# Patient Record
Sex: MALE | Race: WHITE | NOT HISPANIC OR LATINO | Employment: OTHER | URBAN - NONMETROPOLITAN AREA
[De-identification: names, ages, dates, MRNs, and addresses within clinical notes are randomized per-mention and may not be internally consistent; named-entity substitution may affect disease eponyms.]

---

## 2018-04-18 ENCOUNTER — OFFICE VISIT (OUTPATIENT)
Dept: CARDIAC SURGERY | Facility: CLINIC | Age: 63
End: 2018-04-18

## 2018-04-18 VITALS
SYSTOLIC BLOOD PRESSURE: 138 MMHG | HEART RATE: 72 BPM | WEIGHT: 205 LBS | DIASTOLIC BLOOD PRESSURE: 82 MMHG | OXYGEN SATURATION: 98 % | BODY MASS INDEX: 27.77 KG/M2 | HEIGHT: 72 IN

## 2018-04-18 DIAGNOSIS — E11.42 CONTROLLED TYPE 2 DIABETES MELLITUS WITH DIABETIC POLYNEUROPATHY, WITHOUT LONG-TERM CURRENT USE OF INSULIN (HCC): ICD-10-CM

## 2018-04-18 DIAGNOSIS — F17.218 NICOTINE DEPENDENCE, CIGARETTES, WITH OTHER NICOTINE-INDUCED DISORDERS: ICD-10-CM

## 2018-04-18 DIAGNOSIS — I65.29 OCCLUSION AND STENOSIS OF CAROTID ARTERY: Primary | ICD-10-CM

## 2018-04-18 DIAGNOSIS — I65.23 BILATERAL CAROTID ARTERY STENOSIS: ICD-10-CM

## 2018-04-18 DIAGNOSIS — I10 BENIGN ESSENTIAL HTN: ICD-10-CM

## 2018-04-18 DIAGNOSIS — E78.2 MIXED HYPERLIPIDEMIA: ICD-10-CM

## 2018-04-18 PROCEDURE — 99406 BEHAV CHNG SMOKING 3-10 MIN: CPT | Performed by: THORACIC SURGERY (CARDIOTHORACIC VASCULAR SURGERY)

## 2018-04-18 PROCEDURE — 99205 OFFICE O/P NEW HI 60 MIN: CPT | Performed by: THORACIC SURGERY (CARDIOTHORACIC VASCULAR SURGERY)

## 2018-04-18 RX ORDER — METHOCARBAMOL 750 MG/1
750 TABLET, FILM COATED ORAL 3 TIMES DAILY
COMMUNITY
End: 2018-07-03 | Stop reason: SDUPTHER

## 2018-04-18 RX ORDER — PREGABALIN 150 MG/1
150 CAPSULE ORAL 2 TIMES DAILY
COMMUNITY
End: 2018-07-03 | Stop reason: SDUPTHER

## 2018-04-18 RX ORDER — MELOXICAM 15 MG/1
15 TABLET ORAL DAILY
COMMUNITY
End: 2018-07-03 | Stop reason: SDUPTHER

## 2018-04-18 RX ORDER — PREDNISONE 1 MG/1
5 TABLET ORAL DAILY
COMMUNITY
End: 2018-07-03 | Stop reason: SDUPTHER

## 2018-04-18 RX ORDER — MULTIVIT-MIN/IRON/FOLIC ACID/K 18-600-40
2000 CAPSULE ORAL DAILY
COMMUNITY
End: 2018-07-03 | Stop reason: SDUPTHER

## 2018-04-18 RX ORDER — PRAVASTATIN SODIUM 40 MG
40 TABLET ORAL DAILY
COMMUNITY
End: 2018-07-03 | Stop reason: SDUPTHER

## 2018-04-18 RX ORDER — HYDROXYCHLOROQUINE SULFATE 200 MG/1
200 TABLET, FILM COATED ORAL 2 TIMES DAILY
COMMUNITY
End: 2018-07-03 | Stop reason: SDUPTHER

## 2018-04-18 RX ORDER — CLONIDINE HYDROCHLORIDE 0.1 MG/1
0.1 TABLET ORAL 3 TIMES DAILY PRN
COMMUNITY
End: 2018-07-03 | Stop reason: SDUPTHER

## 2018-04-18 RX ORDER — LEVOTHYROXINE SODIUM 0.2 MG/1
200 TABLET ORAL DAILY
COMMUNITY
End: 2018-07-03 | Stop reason: SDUPTHER

## 2018-04-18 RX ORDER — FOLIC ACID 1 MG/1
1 TABLET ORAL DAILY
Status: ON HOLD | COMMUNITY
End: 2018-05-23

## 2018-04-18 RX ORDER — CANDESARTAN 32 MG/1
32 TABLET ORAL DAILY
COMMUNITY
End: 2018-07-03 | Stop reason: SDUPTHER

## 2018-04-18 RX ORDER — HYDROCODONE BITARTRATE AND ACETAMINOPHEN 10; 325 MG/1; MG/1
1 TABLET ORAL 3 TIMES DAILY PRN
COMMUNITY
End: 2018-07-03 | Stop reason: SDUPTHER

## 2018-04-18 RX ORDER — DIAZEPAM 5 MG/1
5 TABLET ORAL 2 TIMES DAILY PRN
COMMUNITY
End: 2018-07-03 | Stop reason: SDUPTHER

## 2018-04-19 PROBLEM — E11.42 CONTROLLED TYPE 2 DIABETES MELLITUS WITH DIABETIC POLYNEUROPATHY, WITHOUT LONG-TERM CURRENT USE OF INSULIN: Status: ACTIVE | Noted: 2018-04-19

## 2018-04-19 PROBLEM — E78.2 MIXED HYPERLIPIDEMIA: Status: ACTIVE | Noted: 2018-04-19

## 2018-04-19 PROBLEM — F17.218 NICOTINE DEPENDENCE, CIGARETTES, WITH OTHER NICOTINE-INDUCED DISORDERS: Status: ACTIVE | Noted: 2018-04-19

## 2018-04-19 PROBLEM — I65.29 OCCLUSION AND STENOSIS OF CAROTID ARTERY: Status: ACTIVE | Noted: 2018-04-19

## 2018-04-19 PROBLEM — I65.23 BILATERAL CAROTID ARTERY STENOSIS: Status: ACTIVE | Noted: 2018-04-19

## 2018-04-19 PROBLEM — I10 BENIGN ESSENTIAL HTN: Status: ACTIVE | Noted: 2018-04-19

## 2018-04-19 NOTE — PROGRESS NOTES
4/18/2018    Jewel Billy  1955    Chief Complaint:    Chief Complaint   Patient presents with   • Carotid Artery Disease       HPI:      PCP:  Kelsey MCCLELLAN, Kaylee MCCLELLAN    62 y.o. male with HTN(stable, increased risk stroke, rupture), Hyperlipidemia(stable, increased risk cardiovascular events), Diabetes Mellitus(stable, increased risk cardiovascular events) and Smoker(uncontrolled, increased risk cardiovascular events), Carotid Stenosis(new, increased risk stroke).  smokes 1/2 PPD.  Rushing noises in head x 1 year.  Carotid bruits noted on exam.   Carotid stenosis noted on duplex imaging.  No TIA stroke amaurosis.  No MI claudication. No other associated signs, symptoms or modifying factors.    4/4/2018 Carotid Duplex:  AWILDA >70% (321cm/s, ratio 4.9) antegrade vert.  LICA 50-69% (221cm/s, ratio 1.9)    8/2017 ECG:  NSR 85, QTc 397    The following portions of the patient's history were reviewed and updated as appropriate: allergies, current medications, past family history, past medical history, past social history, past surgical history and problem list.  Recent images independently reviewed.  Available laboratory values reviewed.    PMH:  Past Medical History:   Diagnosis Date   • Acquired hypothyroidism    • Hashimoto's thyroiditis    • Simple goiter    • Tobacco dependence syndrome        ALLERGIES:  Allergies   Allergen Reactions   • Vancomycin Unknown (See Comments)     Pt states had accident in 1980's and mother told him he had a reaction to this medication during that time         MEDICATIONS:    Current Outpatient Prescriptions:   •  candesartan (ATACAND) 32 MG tablet, Take 32 mg by mouth Daily., Disp: , Rfl:   •  Cholecalciferol (VITAMIN D) 2000 units capsule, Take  by mouth., Disp: , Rfl:   •  CloNIDine (CATAPRES) 0.1 MG tablet, Take 0.1 mg by mouth 3 (Three) Times a Day., Disp: , Rfl:   •  diazePAM (VALIUM) 5 MG tablet, Take 5 mg by mouth 2 (Two) Times a Day As Needed for Anxiety.,  Disp: , Rfl:   •  folic acid (FOLVITE) 1 MG tablet, Take 1 mg by mouth Daily., Disp: , Rfl:   •  HYDROcodone-acetaminophen (NORCO)  MG per tablet, Take 1 tablet by mouth Every 6 (Six) Hours As Needed for Moderate Pain ., Disp: , Rfl:   •  hydroxychloroquine (PLAQUENIL) 200 MG tablet, Take 400 mg by mouth Daily., Disp: , Rfl:   •  levothyroxine (SYNTHROID, LEVOTHROID) 200 MCG tablet, Take 200 mcg by mouth Daily., Disp: , Rfl:   •  meloxicam (MOBIC) 15 MG tablet, Take 15 mg by mouth Daily., Disp: , Rfl:   •  metFORMIN (GLUCOPHAGE) 500 MG tablet, Take 500 mg by mouth 2 (Two) Times a Day With Meals., Disp: , Rfl:   •  methocarbamol (ROBAXIN) 750 MG tablet, Take 750 mg by mouth 3 (Three) Times a Day., Disp: , Rfl:   •  pravastatin (PRAVACHOL) 40 MG tablet, Take 40 mg by mouth Daily., Disp: , Rfl:   •  predniSONE (DELTASONE) 5 MG tablet, Take 5 mg by mouth Daily., Disp: , Rfl:   •  pregabalin (LYRICA) 150 MG capsule, Take 150 mg by mouth 2 (Two) Times a Day., Disp: , Rfl:     Review of Systems   Review of Systems   Constitution: Positive for malaise/fatigue. Negative for night sweats and weight loss.   HENT: Negative for hearing loss, hoarse voice and stridor.    Eyes: Negative for vision loss in left eye, vision loss in right eye and visual disturbance.   Cardiovascular: Positive for claudication, irregular heartbeat, leg swelling and palpitations. Negative for chest pain.   Respiratory: Positive for sleep disturbances due to breathing. Negative for cough, hemoptysis and shortness of breath.    Hematologic/Lymphatic: Negative for adenopathy and bleeding problem. Bruises/bleeds easily.   Skin: Negative for color change, poor wound healing and rash.   Musculoskeletal: Positive for arthritis, back pain, muscle weakness and neck pain.   Gastrointestinal: Negative for abdominal pain, dysphagia and heartburn.   Neurological: Positive for dizziness, headaches, numbness and paresthesias. Negative for seizures.    Psychiatric/Behavioral: Positive for depression. Negative for altered mental status and memory loss. The patient is nervous/anxious.        Physical Exam   Physical Exam   Constitutional: He is oriented to person, place, and time. He is active and cooperative. He does not appear ill. No distress.   HENT:   Head: Atraumatic.   Right Ear: Hearing normal.   Left Ear: Hearing normal.   Nose: No nasal deformity. No epistaxis.   Mouth/Throat: He does not have dentures. Normal dentition.   Eyes: Conjunctivae and lids are normal. Right pupil is round and reactive. Left pupil is round and reactive.   Neck: No JVD present. Carotid bruit is not present. No tracheal deviation present. No thyroid mass and no thyromegaly present.   Cardiovascular: Normal rate and regular rhythm.    No murmur heard.  Pulses:       Carotid pulses are 2+ on the right side with bruit, and 2+ on the left side with bruit.       Radial pulses are 2+ on the right side, and 2+ on the left side.        Dorsalis pedis pulses are 2+ on the right side, and 2+ on the left side.        Posterior tibial pulses are 2+ on the right side, and 2+ on the left side.   Pulmonary/Chest: Effort normal and breath sounds normal.   Abdominal: Soft. He exhibits no distension and no mass. There is no splenomegaly or hepatomegaly. There is no tenderness.   Musculoskeletal: He exhibits no deformity.   Gait normal.    Lymphadenopathy:     He has no cervical adenopathy.        Right: No supraclavicular adenopathy present.        Left: No supraclavicular adenopathy present.   Neurological: He is alert and oriented to person, place, and time. He has normal strength.   Skin: Skin is warm and dry. No cyanosis or erythema. No pallor.   No venous staining   Psychiatric: He has a normal mood and affect. His speech is normal. Judgment and thought content normal.     BUN 14 Creat 1.2    ASSESSMENT:  Jewel was seen today for carotid artery disease.    Diagnoses and all orders for this  visit:    Occlusion and stenosis of carotid artery  -     CT Angiogram Carotids; Future    Nicotine dependence, cigarettes, with other nicotine-induced disorders    Bilateral carotid artery stenosis    Benign essential HTN    Mixed hyperlipidemia    Controlled type 2 diabetes mellitus with diabetic polyneuropathy, without long-term current use of insulin    PLAN:  Detailed discussion with Jewel Billy regarding situation, options and plans.  severe, symptomatic carotid stenosis.  Additional imaging required to evaluate location and degree of stenosis.  Carotid intervention is advisable.  Increased risk for Stroke and cardiovascular complications.  Carotid Stenting vs Endarterectomy is advisable.    Risks, including but not limited to:  Mortality, major morbidity 1%.  Stroke risk 2-3%.  bleeding, transfusion, infection, pulmonary, renal dysfunction, blood vessel and nerve injury.  Benefits:  reduction of stroke risk  Options: carotid endarterectomy, stenting and medical therapy discussed.   usually overnight stay in hospital if all well. Understands and wishes to proceed.    Carotid stenting vs endarterectomy with patch, completion arteriogram, radial arterial line,  Ancef. GEN  Tentative schedule following CTA     CTA Carotids  Aspirin 81mg daily    Return after above studies complete  Smoking cessation advised and assistance options offered including behavioral counseling ([employer] [facility]), Nicotine replacement therapy (patches or gum), pharmacologic therapy (Chantix, Wellbutrin). patient understands that continued use of tobacco products increases her risk of MI, CVA, PAD, cancer; counseling for 3-5min.  Recommended regular physical activity, progressive walking program.  Continue current medications as directed.  Will Obtain relevant old records.    Thank you for the opportunity to participate in this patient's care.    Copy to primary care provider.    EMR Dragon/Transcription  disclaimer:   Much of this encounter note is an electronic transcription/translation of spoken language to printed text. The electronic translation of spoken language may permit erroneous, or at times, nonsensical words or phrases to be inadvertently transcribed; Although I have reviewed the note for such errors, some may still exist.

## 2018-04-24 ENCOUNTER — HOSPITAL ENCOUNTER (OUTPATIENT)
Dept: CT IMAGING | Facility: HOSPITAL | Age: 63
Discharge: HOME | End: 2018-04-24
Admitting: THORACIC SURGERY (CARDIOTHORACIC VASCULAR SURGERY)

## 2018-04-24 DIAGNOSIS — I65.29 OCCLUSION AND STENOSIS OF CAROTID ARTERY: ICD-10-CM

## 2018-04-24 PROCEDURE — 70498 CT ANGIOGRAPHY NECK: CPT

## 2018-04-24 PROCEDURE — 0 IOPAMIDOL PER 1 ML: Performed by: THORACIC SURGERY (CARDIOTHORACIC VASCULAR SURGERY)

## 2018-04-24 RX ADMIN — IOPAMIDOL 75 ML: 755 INJECTION, SOLUTION INTRAVENOUS at 09:55

## 2018-04-30 ENCOUNTER — OFFICE VISIT (OUTPATIENT)
Dept: CARDIAC SURGERY | Facility: CLINIC | Age: 63
End: 2018-04-30

## 2018-04-30 VITALS
HEART RATE: 71 BPM | SYSTOLIC BLOOD PRESSURE: 161 MMHG | WEIGHT: 206.2 LBS | OXYGEN SATURATION: 98 % | BODY MASS INDEX: 27.93 KG/M2 | HEIGHT: 72 IN | DIASTOLIC BLOOD PRESSURE: 83 MMHG

## 2018-04-30 DIAGNOSIS — I65.29 OCCLUSION AND STENOSIS OF CAROTID ARTERY: ICD-10-CM

## 2018-04-30 DIAGNOSIS — F17.218 NICOTINE DEPENDENCE, CIGARETTES, WITH OTHER NICOTINE-INDUCED DISORDERS: ICD-10-CM

## 2018-04-30 DIAGNOSIS — I65.23 BILATERAL CAROTID ARTERY STENOSIS: Primary | ICD-10-CM

## 2018-04-30 DIAGNOSIS — I10 BENIGN ESSENTIAL HTN: ICD-10-CM

## 2018-04-30 DIAGNOSIS — E78.2 MIXED HYPERLIPIDEMIA: ICD-10-CM

## 2018-04-30 DIAGNOSIS — E11.42 CONTROLLED TYPE 2 DIABETES MELLITUS WITH DIABETIC POLYNEUROPATHY, WITHOUT LONG-TERM CURRENT USE OF INSULIN (HCC): ICD-10-CM

## 2018-04-30 PROCEDURE — 99214 OFFICE O/P EST MOD 30 MIN: CPT | Performed by: THORACIC SURGERY (CARDIOTHORACIC VASCULAR SURGERY)

## 2018-04-30 PROCEDURE — 99406 BEHAV CHNG SMOKING 3-10 MIN: CPT | Performed by: THORACIC SURGERY (CARDIOTHORACIC VASCULAR SURGERY)

## 2018-04-30 RX ORDER — SODIUM CHLORIDE 9 MG/ML
100 INJECTION, SOLUTION INTRAVENOUS CONTINUOUS
Status: CANCELLED | OUTPATIENT
Start: 2018-05-23

## 2018-05-07 NOTE — PROGRESS NOTES
4/30/2018    Jewel Billy  1955    Chief Complaint:    Chief Complaint   Patient presents with   • Carotid Artery Disease     CTA results       HPI:    PCP:  Kelsey MCCLELLAN, Kaylee MCCLELLAN     62 y.o. male with HTN(stable, increased risk stroke, rupture), Hyperlipidemia(stable, increased risk cardiovascular events), Diabetes Mellitus(stable, increased risk cardiovascular events) and Smoker(uncontrolled, increased risk cardiovascular events), Carotid Stenosis(new, increased risk stroke).  smokes 1/2 PPD.  Rushing noises in head x 1 year.  Carotid bruits noted on exam.   Carotid stenosis noted on duplex imaging.  No TIA stroke amaurosis.  No MI claudication. No other associated signs, symptoms or modifying factors.     4/4/2018 Carotid Duplex:  AWILDA >70% (321cm/s, ratio 4.9) antegrade vert.  LICA 50-69% (221cm/s, ratio 1.9)   4/24/2018 CTA Carotids:  AWILDA 90%, LICA 80%    8/2017 ECG:  NSR 85, QTc 397      The following portions of the patient's history were reviewed and updated as appropriate: allergies, current medications, past family history, past medical history, past social history, past surgical history and problem list.  Recent images independently reviewed.  Available laboratory values reviewed.    PMH:  Past Medical History:   Diagnosis Date   • Acquired hypothyroidism    • Hashimoto's thyroiditis    • Simple goiter    • Tobacco dependence syndrome        ALLERGIES:  Allergies   Allergen Reactions   • Vancomycin Unknown (See Comments)     Pt states had accident in 1980's and mother told him he had a reaction to this medication during that time         MEDICATIONS:    Current Outpatient Prescriptions:   •  candesartan (ATACAND) 32 MG tablet, Take 32 mg by mouth Daily., Disp: , Rfl:   •  Cholecalciferol (VITAMIN D) 2000 units capsule, Take  by mouth., Disp: , Rfl:   •  CloNIDine (CATAPRES) 0.1 MG tablet, Take 0.1 mg by mouth 3 (Three) Times a Day., Disp: , Rfl:   •  diazePAM (VALIUM) 5 MG  tablet, Take 5 mg by mouth 2 (Two) Times a Day As Needed for Anxiety., Disp: , Rfl:   •  folic acid (FOLVITE) 1 MG tablet, Take 1 mg by mouth Daily., Disp: , Rfl:   •  HYDROcodone-acetaminophen (NORCO)  MG per tablet, Take 1 tablet by mouth Every 6 (Six) Hours As Needed for Moderate Pain ., Disp: , Rfl:   •  hydroxychloroquine (PLAQUENIL) 200 MG tablet, Take 400 mg by mouth Daily., Disp: , Rfl:   •  levothyroxine (SYNTHROID, LEVOTHROID) 200 MCG tablet, Take 200 mcg by mouth Daily., Disp: , Rfl:   •  meloxicam (MOBIC) 15 MG tablet, Take 15 mg by mouth Daily., Disp: , Rfl:   •  metFORMIN (GLUCOPHAGE) 500 MG tablet, Take 500 mg by mouth 2 (Two) Times a Day With Meals., Disp: , Rfl:   •  methocarbamol (ROBAXIN) 750 MG tablet, Take 750 mg by mouth 3 (Three) Times a Day., Disp: , Rfl:   •  pravastatin (PRAVACHOL) 40 MG tablet, Take 40 mg by mouth Daily., Disp: , Rfl:   •  predniSONE (DELTASONE) 5 MG tablet, Take 5 mg by mouth Daily., Disp: , Rfl:   •  pregabalin (LYRICA) 150 MG capsule, Take 150 mg by mouth 2 (Two) Times a Day., Disp: , Rfl:     Review of Systems   Review of Systems   Constitution: Positive for malaise/fatigue. Negative for weakness and weight loss.   Cardiovascular: Positive for claudication, irregular heartbeat and leg swelling. Negative for chest pain and dyspnea on exertion.   Respiratory: Positive for sleep disturbances due to breathing. Negative for cough and shortness of breath.    Hematologic/Lymphatic: Bruises/bleeds easily.   Skin: Negative for color change and poor wound healing.   Musculoskeletal: Positive for arthritis, back pain, muscle weakness and neck pain.   Neurological: Positive for dizziness, numbness and paresthesias.   Psychiatric/Behavioral: Positive for depression. The patient is nervous/anxious.        Physical Exam   Physical Exam   Constitutional: He is oriented to person, place, and time. He is active and cooperative. He does not appear ill. No distress.   HENT:   Right  Ear: Hearing normal.   Left Ear: Hearing normal.   Nose: No nasal deformity. No epistaxis.   Mouth/Throat: He does not have dentures. Normal dentition.   Cardiovascular: Normal rate and regular rhythm.    No murmur heard.  Pulses:       Carotid pulses are 2+ on the right side with bruit, and 2+ on the left side with bruit.       Radial pulses are 2+ on the right side, and 2+ on the left side.        Dorsalis pedis pulses are 2+ on the right side, and 2+ on the left side.        Posterior tibial pulses are 2+ on the right side, and 2+ on the left side.   Pulmonary/Chest: Effort normal and breath sounds normal.   Abdominal: Soft. He exhibits no distension and no mass. There is no tenderness.   Musculoskeletal: He exhibits no deformity.   Gait normal.    Neurological: He is alert and oriented to person, place, and time. He has normal strength.   Skin: Skin is warm and dry. No cyanosis or erythema. No pallor.   No venous staining   Psychiatric: He has a normal mood and affect. His speech is normal. Judgment and thought content normal.   BUN 14 Creat 1.2    ASSESSMENT:  Jewel was seen today for carotid artery disease.    Diagnoses and all orders for this visit:    Bilateral carotid artery stenosis    Occlusion and stenosis of carotid artery  -     Case Request; Standing  -     Type and screen; Future  -     ceFAZolin (ANCEF) 2 g in sodium chloride 0.9 % 100 mL IVPB; Infuse 2 g into a venous catheter 1 (One) Time.  -     sodium chloride 0.9 % infusion; Infuse 100 mL/hr into a venous catheter Continuous.  -     Case Request    Mixed hyperlipidemia    Benign essential HTN    Controlled type 2 diabetes mellitus with diabetic polyneuropathy, without long-term current use of insulin    Nicotine dependence, cigarettes, with other nicotine-induced disorders    Other orders  -     Inpatient Admission; Standing  -     Provide NPO Instructions to Patient; Future  -     Clorhexidine Skin Prep; Future  -     Obtain informed  consent; Standing  -     Insert Arterial Line; Standing  -     Chlorhexidine 4%/Hibiclens Skin Prep; Standing  -     Place sequential compression device; Standing    PLAN:  Detailed discussion with Jewel Billy regarding situation, options and plans.  severe, asymptomatic carotid stenosis.  Carotid intervention is advisable.  Increased risk for Stroke and cardiovascular complications.  Carotid Endarterectomy is advisable.    Risks, including but not limited to:  Mortality, major morbidity 1%.  Stroke risk 2-3%.  bleeding, transfusion, infection, pulmonary, renal dysfunction, blood vessel and nerve injury.  Benefits:  reduction of stroke risk  Options: carotid endarterectomy, stenting and medical therapy discussed.   usually overnight stay in hospital if all well. Understands and wishes to proceed.    RIGHT Carotid endarterectomy with patch, completion arteriogram, radial arterial line,  Ancef. GEN  SDS 5/23/2018     Return after above studies complete  Smoking cessation advised and assistance options offered including behavioral counseling ([employer] [facility] [facility]), Nicotine replacement therapy (patches or gum), pharmacologic therapy (Chantix, Wellbutrin). patient understands that continued use of tobacco products increases her risk of MI, CVA, PAD, cancer; counseling for 3-5min.    Recommended regular physical activity, progressive walking program.  Continue current medications as directed.    Thank you for the opportunity to participate in this patient's care.    Copy to primary care provider.    EMR Dragon/Transcription disclaimer:   Much of this encounter note is an electronic transcription/translation of spoken language to printed text. The electronic translation of spoken language may permit erroneous, or at times, nonsensical words or phrases to be inadvertently transcribed; Although I have reviewed the note for such errors, some may still exist.

## 2018-05-17 ENCOUNTER — APPOINTMENT (OUTPATIENT)
Dept: PREADMISSION TESTING | Facility: HOSPITAL | Age: 63
End: 2018-05-17

## 2018-05-17 VITALS
HEART RATE: 70 BPM | RESPIRATION RATE: 12 BRPM | BODY MASS INDEX: 27.43 KG/M2 | DIASTOLIC BLOOD PRESSURE: 84 MMHG | WEIGHT: 202.5 LBS | HEIGHT: 72 IN | SYSTOLIC BLOOD PRESSURE: 154 MMHG

## 2018-05-17 DIAGNOSIS — I65.29 OCCLUSION AND STENOSIS OF CAROTID ARTERY: ICD-10-CM

## 2018-05-17 LAB
ABO GROUP BLD: NORMAL
ANION GAP SERPL CALCULATED.3IONS-SCNC: 12 MMOL/L (ref 5–15)
BLD GP AB SCN SERPL QL: NEGATIVE
BUN BLD-MCNC: 16 MG/DL (ref 7–21)
BUN/CREAT SERPL: 15 (ref 7–25)
CALCIUM SPEC-SCNC: 9.9 MG/DL (ref 8.4–10.2)
CHLORIDE SERPL-SCNC: 99 MMOL/L (ref 95–110)
CO2 SERPL-SCNC: 25 MMOL/L (ref 22–31)
CREAT BLD-MCNC: 1.07 MG/DL (ref 0.7–1.3)
DEPRECATED RDW RBC AUTO: 44.6 FL (ref 35.1–43.9)
ERYTHROCYTE [DISTWIDTH] IN BLOOD BY AUTOMATED COUNT: 14.2 % (ref 11.5–14.5)
GFR SERPL CREATININE-BSD FRML MDRD: 70 ML/MIN/1.73 (ref 49–113)
GLUCOSE BLD-MCNC: 141 MG/DL (ref 60–100)
HCT VFR BLD AUTO: 42.9 % (ref 39–49)
HGB BLD-MCNC: 15 G/DL (ref 13.7–17.3)
Lab: NORMAL
MCH RBC QN AUTO: 29.5 PG (ref 26.5–34)
MCHC RBC AUTO-ENTMCNC: 35 G/DL (ref 31.5–36.3)
MCV RBC AUTO: 84.4 FL (ref 80–98)
PLATELET # BLD AUTO: 239 10*3/MM3 (ref 150–450)
PMV BLD AUTO: 9.5 FL (ref 8–12)
POTASSIUM BLD-SCNC: 4.9 MMOL/L (ref 3.5–5.1)
RBC # BLD AUTO: 5.08 10*6/MM3 (ref 4.37–5.74)
RH BLD: NEGATIVE
SODIUM BLD-SCNC: 136 MMOL/L (ref 137–145)
T&S EXPIRATION DATE: NORMAL
WBC NRBC COR # BLD: 9.2 10*3/MM3 (ref 3.2–9.8)

## 2018-05-17 PROCEDURE — 86850 RBC ANTIBODY SCREEN: CPT | Performed by: THORACIC SURGERY (CARDIOTHORACIC VASCULAR SURGERY)

## 2018-05-17 PROCEDURE — 93005 ELECTROCARDIOGRAM TRACING: CPT

## 2018-05-17 PROCEDURE — 86900 BLOOD TYPING SEROLOGIC ABO: CPT | Performed by: THORACIC SURGERY (CARDIOTHORACIC VASCULAR SURGERY)

## 2018-05-17 PROCEDURE — 36415 COLL VENOUS BLD VENIPUNCTURE: CPT

## 2018-05-17 PROCEDURE — 80048 BASIC METABOLIC PNL TOTAL CA: CPT | Performed by: ANESTHESIOLOGY

## 2018-05-17 PROCEDURE — 85027 COMPLETE CBC AUTOMATED: CPT | Performed by: ANESTHESIOLOGY

## 2018-05-17 PROCEDURE — 86901 BLOOD TYPING SEROLOGIC RH(D): CPT | Performed by: THORACIC SURGERY (CARDIOTHORACIC VASCULAR SURGERY)

## 2018-05-17 PROCEDURE — 93010 ELECTROCARDIOGRAM REPORT: CPT | Performed by: INTERNAL MEDICINE

## 2018-05-17 RX ORDER — FAMCICLOVIR 500 MG/1
500 TABLET ORAL EVERY 8 HOURS PRN
COMMUNITY
End: 2018-07-03 | Stop reason: SDUPTHER

## 2018-05-17 NOTE — DISCHARGE INSTRUCTIONS
[facility]  Pre-op Information and Guidelines    You will be called after 2 p.m. the day before your surgery (Friday for Monday surgery) and notified of your time for arrival and approximate surgery time.  If you have not received a call by 4P.M., please contact Same Day Surgery at [phone] of if outside [facility] call [phone].    Please Follow these Important Safety Guidelines:    • The morning of your procedure, take only the medications listed below with   A sip of water:_____________________________________________       ______________________________________________    • DO NOT eat or drink anything after 12:00 midnight the night before surgery  Specific instructions concerning drinking clear liquids will be discussed during  the pre-surgery instruction call the day before your surgery.    • If you take a blood thinner (ex. Plavix, Coumadin, aspirin), ask your doctor when to stop it before surgery  STOP DATE: _________________    • Only 2 visitors are allowed in patient rooms at a time  Your visitors will be asked to wait in the lobby until the admission process is complete with the exception of a parent with a child and patients in need of special assistance.    • YOU CANNOT DRIVE YOURSELF HOME  You must be accompanied by someone who will be responsible for driving you home after surgery and for your care at home.    • DO NOT chew gum, use breath mints, hard candy, or smoke the day of surgery  • DO NOT drink alcohol for at least 24 hours before your surgery  • DO NOT wear any jewelry and remove all body piercing before coming to the hospital  • DO NOT wear make-up to the hospital  • If you are having surgery on an extremity (arm/leg/foot) remove nail polish/artificial nails on the surgical side  • Clothing, glasses, contacts, dentures, and hairpieces must be removed before surgery  • Bathe the night before or the morning of your surgery and do not use powders/lotions on  skin.

## 2018-05-17 NOTE — PAT
Read EKG to Dr. Vallejo over the phone, no further orders or instructions at this time.  Chlorhexidine cloths given with written and verbal instructions.

## 2018-05-22 ENCOUNTER — ANESTHESIA EVENT (OUTPATIENT)
Dept: PERIOP | Facility: HOSPITAL | Age: 63
End: 2018-05-22

## 2018-05-23 ENCOUNTER — ANESTHESIA (OUTPATIENT)
Dept: PERIOP | Facility: HOSPITAL | Age: 63
End: 2018-05-23

## 2018-05-23 ENCOUNTER — APPOINTMENT (OUTPATIENT)
Dept: GENERAL RADIOLOGY | Facility: HOSPITAL | Age: 63
End: 2018-05-23

## 2018-05-23 ENCOUNTER — HOSPITAL ENCOUNTER (INPATIENT)
Facility: HOSPITAL | Age: 63
LOS: 1 days | Discharge: HOME | End: 2018-05-24
Attending: THORACIC SURGERY (CARDIOTHORACIC VASCULAR SURGERY) | Admitting: THORACIC SURGERY (CARDIOTHORACIC VASCULAR SURGERY)

## 2018-05-23 DIAGNOSIS — I65.29 OCCLUSION AND STENOSIS OF CAROTID ARTERY: ICD-10-CM

## 2018-05-23 LAB
ABO GROUP BLD: NORMAL
BLD GP AB SCN SERPL QL: NEGATIVE
GLUCOSE BLDC GLUCOMTR-MCNC: 116 MG/DL (ref 70–130)
GLUCOSE BLDC GLUCOMTR-MCNC: 150 MG/DL (ref 70–130)
Lab: NORMAL
RH BLD: NEGATIVE
T&S EXPIRATION DATE: NORMAL

## 2018-05-23 PROCEDURE — 86850 RBC ANTIBODY SCREEN: CPT | Performed by: ANESTHESIOLOGY

## 2018-05-23 PROCEDURE — 25010000002 PHENYLEPHRINE PER 1 ML: Performed by: NURSE ANESTHETIST, CERTIFIED REGISTERED

## 2018-05-23 PROCEDURE — 76000 FLUOROSCOPY <1 HR PHYS/QHP: CPT

## 2018-05-23 PROCEDURE — 94760 N-INVAS EAR/PLS OXIMETRY 1: CPT

## 2018-05-23 PROCEDURE — 82962 GLUCOSE BLOOD TEST: CPT

## 2018-05-23 PROCEDURE — 63710000001 PREDNISONE PER 5 MG: Performed by: THORACIC SURGERY (CARDIOTHORACIC VASCULAR SURGERY)

## 2018-05-23 PROCEDURE — 94799 UNLISTED PULMONARY SVC/PX: CPT

## 2018-05-23 PROCEDURE — 25010000002 IOPAMIDOL 61 % SOLUTION: Performed by: THORACIC SURGERY (CARDIOTHORACIC VASCULAR SURGERY)

## 2018-05-23 PROCEDURE — 86901 BLOOD TYPING SEROLOGIC RH(D): CPT | Performed by: ANESTHESIOLOGY

## 2018-05-23 PROCEDURE — 88311 DECALCIFY TISSUE: CPT | Performed by: THORACIC SURGERY (CARDIOTHORACIC VASCULAR SURGERY)

## 2018-05-23 PROCEDURE — 25010000002 HYDROMORPHONE PER 4 MG: Performed by: NURSE ANESTHETIST, CERTIFIED REGISTERED

## 2018-05-23 PROCEDURE — 25010000003 CEFAZOLIN PER 500 MG: Performed by: THORACIC SURGERY (CARDIOTHORACIC VASCULAR SURGERY)

## 2018-05-23 PROCEDURE — 25010000002 FENTANYL CITRATE (PF) 100 MCG/2ML SOLUTION: Performed by: NURSE ANESTHETIST, CERTIFIED REGISTERED

## 2018-05-23 PROCEDURE — 88304 TISSUE EXAM BY PATHOLOGIST: CPT | Performed by: THORACIC SURGERY (CARDIOTHORACIC VASCULAR SURGERY)

## 2018-05-23 PROCEDURE — 25010000002 PROPOFOL 10 MG/ML EMULSION: Performed by: NURSE ANESTHETIST, CERTIFIED REGISTERED

## 2018-05-23 PROCEDURE — 25010000002 HEPARIN (PORCINE) PER 1000 UNITS: Performed by: NURSE ANESTHETIST, CERTIFIED REGISTERED

## 2018-05-23 PROCEDURE — 25010000002 SUCCINYLCHOLINE PER 20 MG: Performed by: NURSE ANESTHETIST, CERTIFIED REGISTERED

## 2018-05-23 PROCEDURE — 25010000002 PROTAMINE SULFATE PER 10 MG: Performed by: NURSE ANESTHETIST, CERTIFIED REGISTERED

## 2018-05-23 PROCEDURE — C1768 GRAFT, VASCULAR: HCPCS | Performed by: THORACIC SURGERY (CARDIOTHORACIC VASCULAR SURGERY)

## 2018-05-23 PROCEDURE — 88304 TISSUE EXAM BY PATHOLOGIST: CPT | Performed by: PATHOLOGY

## 2018-05-23 PROCEDURE — 35301 RECHANNELING OF ARTERY: CPT | Performed by: THORACIC SURGERY (CARDIOTHORACIC VASCULAR SURGERY)

## 2018-05-23 PROCEDURE — 94640 AIRWAY INHALATION TREATMENT: CPT

## 2018-05-23 PROCEDURE — 25010000002 MIDAZOLAM PER 1 MG: Performed by: NURSE ANESTHETIST, CERTIFIED REGISTERED

## 2018-05-23 PROCEDURE — 25010000002 NEOSTIGMINE 4 MG/4ML SOLUTION PREFILLED SYRINGE: Performed by: NURSE ANESTHETIST, CERTIFIED REGISTERED

## 2018-05-23 PROCEDURE — 88311 DECALCIFY TISSUE: CPT | Performed by: PATHOLOGY

## 2018-05-23 PROCEDURE — 93010 ELECTROCARDIOGRAM REPORT: CPT | Performed by: INTERNAL MEDICINE

## 2018-05-23 PROCEDURE — 03CM0ZZ EXTIRPATION OF MATTER FROM RIGHT EXTERNAL CAROTID ARTERY, OPEN APPROACH: ICD-10-PCS | Performed by: THORACIC SURGERY (CARDIOTHORACIC VASCULAR SURGERY)

## 2018-05-23 PROCEDURE — 86900 BLOOD TYPING SEROLOGIC ABO: CPT | Performed by: ANESTHESIOLOGY

## 2018-05-23 PROCEDURE — 03CK0ZZ EXTIRPATION OF MATTER FROM RIGHT INTERNAL CAROTID ARTERY, OPEN APPROACH: ICD-10-PCS | Performed by: THORACIC SURGERY (CARDIOTHORACIC VASCULAR SURGERY)

## 2018-05-23 PROCEDURE — 93005 ELECTROCARDIOGRAM TRACING: CPT | Performed by: ANESTHESIOLOGY

## 2018-05-23 PROCEDURE — 25010000002 HEPARIN (PORCINE) PER 1000 UNITS: Performed by: THORACIC SURGERY (CARDIOTHORACIC VASCULAR SURGERY)

## 2018-05-23 DEVICE — PTCH VASC XENOSURE BIOLOGIC 1X6CM: Type: IMPLANTABLE DEVICE | Site: CAROTID | Status: FUNCTIONAL

## 2018-05-23 RX ORDER — LIDOCAINE HYDROCHLORIDE 20 MG/ML
INJECTION, SOLUTION INFILTRATION; PERINEURAL AS NEEDED
Status: DISCONTINUED | OUTPATIENT
Start: 2018-05-23 | End: 2018-05-23 | Stop reason: SURG

## 2018-05-23 RX ORDER — FLUMAZENIL 0.1 MG/ML
0.2 INJECTION INTRAVENOUS AS NEEDED
Status: DISCONTINUED | OUTPATIENT
Start: 2018-05-23 | End: 2018-05-23 | Stop reason: HOSPADM

## 2018-05-23 RX ORDER — CLONIDINE HYDROCHLORIDE 0.1 MG/1
0.1 TABLET ORAL 3 TIMES DAILY
Status: DISCONTINUED | OUTPATIENT
Start: 2018-05-23 | End: 2018-05-24 | Stop reason: HOSPADM

## 2018-05-23 RX ORDER — PREDNISONE 1 MG/1
5 TABLET ORAL DAILY
Status: DISCONTINUED | OUTPATIENT
Start: 2018-05-23 | End: 2018-05-24 | Stop reason: HOSPADM

## 2018-05-23 RX ORDER — SODIUM CHLORIDE 9 MG/ML
75 INJECTION, SOLUTION INTRAVENOUS CONTINUOUS
Status: DISCONTINUED | OUTPATIENT
Start: 2018-05-23 | End: 2018-05-24 | Stop reason: HOSPADM

## 2018-05-23 RX ORDER — GLYCOPYRROLATE 0.2 MG/ML
INJECTION INTRAMUSCULAR; INTRAVENOUS AS NEEDED
Status: DISCONTINUED | OUTPATIENT
Start: 2018-05-23 | End: 2018-05-23 | Stop reason: SURG

## 2018-05-23 RX ORDER — SODIUM CHLORIDE 9 MG/ML
INJECTION, SOLUTION INTRAVENOUS AS NEEDED
Status: DISCONTINUED | OUTPATIENT
Start: 2018-05-23 | End: 2018-05-24 | Stop reason: HOSPADM

## 2018-05-23 RX ORDER — HEPARIN SODIUM 1000 [USP'U]/ML
INJECTION, SOLUTION INTRAVENOUS; SUBCUTANEOUS AS NEEDED
Status: DISCONTINUED | OUTPATIENT
Start: 2018-05-23 | End: 2018-05-23 | Stop reason: SURG

## 2018-05-23 RX ORDER — PROTAMINE SULFATE 10 MG/ML
INJECTION, SOLUTION INTRAVENOUS AS NEEDED
Status: DISCONTINUED | OUTPATIENT
Start: 2018-05-23 | End: 2018-05-23 | Stop reason: SURG

## 2018-05-23 RX ORDER — SUCCINYLCHOLINE CHLORIDE 20 MG/ML
INJECTION INTRAMUSCULAR; INTRAVENOUS AS NEEDED
Status: DISCONTINUED | OUTPATIENT
Start: 2018-05-23 | End: 2018-05-23 | Stop reason: SURG

## 2018-05-23 RX ORDER — ROCURONIUM BROMIDE 10 MG/ML
INJECTION, SOLUTION INTRAVENOUS AS NEEDED
Status: DISCONTINUED | OUTPATIENT
Start: 2018-05-23 | End: 2018-05-23 | Stop reason: SURG

## 2018-05-23 RX ORDER — ONDANSETRON 4 MG/1
4 TABLET, ORALLY DISINTEGRATING ORAL EVERY 6 HOURS PRN
Status: DISCONTINUED | OUTPATIENT
Start: 2018-05-23 | End: 2018-05-24 | Stop reason: HOSPADM

## 2018-05-23 RX ORDER — DIPHENHYDRAMINE HYDROCHLORIDE 50 MG/ML
12.5 INJECTION INTRAMUSCULAR; INTRAVENOUS
Status: DISCONTINUED | OUTPATIENT
Start: 2018-05-23 | End: 2018-05-23 | Stop reason: HOSPADM

## 2018-05-23 RX ORDER — NALOXONE HCL 0.4 MG/ML
0.2 VIAL (ML) INJECTION AS NEEDED
Status: DISCONTINUED | OUTPATIENT
Start: 2018-05-23 | End: 2018-05-23 | Stop reason: HOSPADM

## 2018-05-23 RX ORDER — ONDANSETRON 2 MG/ML
4 INJECTION INTRAMUSCULAR; INTRAVENOUS ONCE AS NEEDED
Status: DISCONTINUED | OUTPATIENT
Start: 2018-05-23 | End: 2018-05-23 | Stop reason: HOSPADM

## 2018-05-23 RX ORDER — HEPARIN SODIUM 5000 [USP'U]/ML
INJECTION, SOLUTION INTRAVENOUS; SUBCUTANEOUS AS NEEDED
Status: DISCONTINUED | OUTPATIENT
Start: 2018-05-23 | End: 2018-05-24 | Stop reason: HOSPADM

## 2018-05-23 RX ORDER — PREGABALIN 75 MG/1
150 CAPSULE ORAL EVERY 12 HOURS SCHEDULED
Status: DISCONTINUED | OUTPATIENT
Start: 2018-05-23 | End: 2018-05-24 | Stop reason: HOSPADM

## 2018-05-23 RX ORDER — FAMOTIDINE 20 MG/1
20 TABLET, FILM COATED ORAL EVERY 12 HOURS SCHEDULED
Status: DISCONTINUED | OUTPATIENT
Start: 2018-05-23 | End: 2018-05-24 | Stop reason: HOSPADM

## 2018-05-23 RX ORDER — SODIUM CHLORIDE, SODIUM GLUCONATE, SODIUM ACETATE, POTASSIUM CHLORIDE, AND MAGNESIUM CHLORIDE 526; 502; 368; 37; 30 MG/100ML; MG/100ML; MG/100ML; MG/100ML; MG/100ML
INJECTION, SOLUTION INTRAVENOUS CONTINUOUS PRN
Status: DISCONTINUED | OUTPATIENT
Start: 2018-05-23 | End: 2018-05-23 | Stop reason: SURG

## 2018-05-23 RX ORDER — HYDROXYCHLOROQUINE SULFATE 200 MG/1
200 TABLET, FILM COATED ORAL 2 TIMES DAILY
Status: DISCONTINUED | OUTPATIENT
Start: 2018-05-23 | End: 2018-05-24 | Stop reason: HOSPADM

## 2018-05-23 RX ORDER — EPHEDRINE SULFATE 50 MG/ML
INJECTION, SOLUTION INTRAVENOUS AS NEEDED
Status: DISCONTINUED | OUTPATIENT
Start: 2018-05-23 | End: 2018-05-23 | Stop reason: SURG

## 2018-05-23 RX ORDER — PROPOFOL 10 MG/ML
VIAL (ML) INTRAVENOUS AS NEEDED
Status: DISCONTINUED | OUTPATIENT
Start: 2018-05-23 | End: 2018-05-23 | Stop reason: SURG

## 2018-05-23 RX ORDER — ASPIRIN 325 MG
325 TABLET ORAL ONCE
Status: COMPLETED | OUTPATIENT
Start: 2018-05-23 | End: 2018-05-23

## 2018-05-23 RX ORDER — SODIUM CHLORIDE, SODIUM GLUCONATE, SODIUM ACETATE, POTASSIUM CHLORIDE AND MAGNESIUM CHLORIDE 526; 502; 368; 37; 30 MG/100ML; MG/100ML; MG/100ML; MG/100ML; MG/100ML
INJECTION, SOLUTION INTRAVENOUS AS NEEDED
Status: DISCONTINUED | OUTPATIENT
Start: 2018-05-23 | End: 2018-05-23

## 2018-05-23 RX ORDER — ATORVASTATIN CALCIUM 20 MG/1
20 TABLET, FILM COATED ORAL DAILY
Status: DISCONTINUED | OUTPATIENT
Start: 2018-05-23 | End: 2018-05-24 | Stop reason: HOSPADM

## 2018-05-23 RX ORDER — NITROGLYCERIN 5 MG/ML
INJECTION, SOLUTION INTRAVENOUS AS NEEDED
Status: DISCONTINUED | OUTPATIENT
Start: 2018-05-23 | End: 2018-05-23 | Stop reason: SURG

## 2018-05-23 RX ORDER — ACETAMINOPHEN 650 MG/1
650 SUPPOSITORY RECTAL ONCE AS NEEDED
Status: DISCONTINUED | OUTPATIENT
Start: 2018-05-23 | End: 2018-05-23 | Stop reason: HOSPADM

## 2018-05-23 RX ORDER — NITROGLYCERIN 40 MG/100ML
5-200 INJECTION INTRAVENOUS
Status: DISCONTINUED | OUTPATIENT
Start: 2018-05-23 | End: 2018-05-24 | Stop reason: HOSPADM

## 2018-05-23 RX ORDER — HYDROCODONE BITARTRATE AND ACETAMINOPHEN 10; 325 MG/1; MG/1
1 TABLET ORAL EVERY 4 HOURS PRN
Status: DISCONTINUED | OUTPATIENT
Start: 2018-05-23 | End: 2018-05-24 | Stop reason: HOSPADM

## 2018-05-23 RX ORDER — LIDOCAINE HYDROCHLORIDE 10 MG/ML
INJECTION, SOLUTION INFILTRATION; PERINEURAL AS NEEDED
Status: DISCONTINUED | OUTPATIENT
Start: 2018-05-23 | End: 2018-05-24 | Stop reason: HOSPADM

## 2018-05-23 RX ORDER — NEOSTIGMINE METHYLSULFATE 4 MG/4 ML
SYRINGE (ML) INTRAVENOUS AS NEEDED
Status: DISCONTINUED | OUTPATIENT
Start: 2018-05-23 | End: 2018-05-23 | Stop reason: SURG

## 2018-05-23 RX ORDER — ACETAMINOPHEN 325 MG/1
650 TABLET ORAL ONCE AS NEEDED
Status: DISCONTINUED | OUTPATIENT
Start: 2018-05-23 | End: 2018-05-23 | Stop reason: HOSPADM

## 2018-05-23 RX ORDER — MIDAZOLAM HYDROCHLORIDE 1 MG/ML
INJECTION INTRAMUSCULAR; INTRAVENOUS AS NEEDED
Status: DISCONTINUED | OUTPATIENT
Start: 2018-05-23 | End: 2018-05-23 | Stop reason: SURG

## 2018-05-23 RX ORDER — MEPERIDINE HYDROCHLORIDE 50 MG/ML
12.5 INJECTION INTRAMUSCULAR; INTRAVENOUS; SUBCUTANEOUS
Status: DISCONTINUED | OUTPATIENT
Start: 2018-05-23 | End: 2018-05-23 | Stop reason: HOSPADM

## 2018-05-23 RX ORDER — SODIUM CHLORIDE 9 MG/ML
100 INJECTION, SOLUTION INTRAVENOUS CONTINUOUS
Status: DISCONTINUED | OUTPATIENT
Start: 2018-05-23 | End: 2018-05-23 | Stop reason: HOSPADM

## 2018-05-23 RX ORDER — LEVOTHYROXINE SODIUM 0.1 MG/1
200 TABLET ORAL
Status: DISCONTINUED | OUTPATIENT
Start: 2018-05-24 | End: 2018-05-24 | Stop reason: HOSPADM

## 2018-05-23 RX ORDER — VALSARTAN 160 MG/1
320 TABLET ORAL
Status: DISCONTINUED | OUTPATIENT
Start: 2018-05-23 | End: 2018-05-24 | Stop reason: HOSPADM

## 2018-05-23 RX ORDER — ONDANSETRON 2 MG/ML
4 INJECTION INTRAMUSCULAR; INTRAVENOUS EVERY 6 HOURS PRN
Status: DISCONTINUED | OUTPATIENT
Start: 2018-05-23 | End: 2018-05-24 | Stop reason: HOSPADM

## 2018-05-23 RX ORDER — FENTANYL CITRATE 50 UG/ML
INJECTION, SOLUTION INTRAMUSCULAR; INTRAVENOUS AS NEEDED
Status: DISCONTINUED | OUTPATIENT
Start: 2018-05-23 | End: 2018-05-23 | Stop reason: SURG

## 2018-05-23 RX ORDER — MELOXICAM 15 MG/1
15 TABLET ORAL DAILY
Status: DISCONTINUED | OUTPATIENT
Start: 2018-05-24 | End: 2018-05-24 | Stop reason: HOSPADM

## 2018-05-23 RX ORDER — BISACODYL 10 MG
10 SUPPOSITORY, RECTAL RECTAL DAILY PRN
Status: DISCONTINUED | OUTPATIENT
Start: 2018-05-23 | End: 2018-05-24 | Stop reason: HOSPADM

## 2018-05-23 RX ORDER — ALBUTEROL SULFATE 2.5 MG/3ML
2.5 SOLUTION RESPIRATORY (INHALATION)
Status: DISCONTINUED | OUTPATIENT
Start: 2018-05-23 | End: 2018-05-24 | Stop reason: HOSPADM

## 2018-05-23 RX ORDER — EPHEDRINE SULFATE 50 MG/ML
5 INJECTION, SOLUTION INTRAVENOUS ONCE AS NEEDED
Status: DISCONTINUED | OUTPATIENT
Start: 2018-05-23 | End: 2018-05-23 | Stop reason: HOSPADM

## 2018-05-23 RX ORDER — ONDANSETRON 4 MG/1
4 TABLET, FILM COATED ORAL EVERY 6 HOURS PRN
Status: DISCONTINUED | OUTPATIENT
Start: 2018-05-23 | End: 2018-05-24 | Stop reason: HOSPADM

## 2018-05-23 RX ORDER — SENNA AND DOCUSATE SODIUM 50; 8.6 MG/1; MG/1
2 TABLET, FILM COATED ORAL 2 TIMES DAILY PRN
Status: DISCONTINUED | OUTPATIENT
Start: 2018-05-23 | End: 2018-05-24 | Stop reason: HOSPADM

## 2018-05-23 RX ORDER — ASPIRIN 81 MG/1
81 TABLET ORAL DAILY
Status: DISCONTINUED | OUTPATIENT
Start: 2018-05-23 | End: 2018-05-24 | Stop reason: HOSPADM

## 2018-05-23 RX ORDER — ACETAMINOPHEN 325 MG/1
650 TABLET ORAL EVERY 4 HOURS PRN
Status: DISCONTINUED | OUTPATIENT
Start: 2018-05-23 | End: 2018-05-24 | Stop reason: HOSPADM

## 2018-05-23 RX ORDER — DIAZEPAM 5 MG/1
5 TABLET ORAL 2 TIMES DAILY PRN
Status: DISCONTINUED | OUTPATIENT
Start: 2018-05-23 | End: 2018-05-24 | Stop reason: HOSPADM

## 2018-05-23 RX ORDER — METHOCARBAMOL 750 MG/1
750 TABLET, FILM COATED ORAL 3 TIMES DAILY
Status: DISCONTINUED | OUTPATIENT
Start: 2018-05-23 | End: 2018-05-24 | Stop reason: HOSPADM

## 2018-05-23 RX ORDER — LABETALOL HYDROCHLORIDE 5 MG/ML
5 INJECTION, SOLUTION INTRAVENOUS
Status: DISCONTINUED | OUTPATIENT
Start: 2018-05-23 | End: 2018-05-23 | Stop reason: HOSPADM

## 2018-05-23 RX ADMIN — FENTANYL CITRATE 50 MCG: 50 INJECTION, SOLUTION INTRAMUSCULAR; INTRAVENOUS at 07:28

## 2018-05-23 RX ADMIN — HYDROCODONE BITARTRATE AND ACETAMINOPHEN 1 TABLET: 10; 325 TABLET ORAL at 20:18

## 2018-05-23 RX ADMIN — PHENYLEPHRINE HYDROCHLORIDE 50 MCG: 10 INJECTION INTRAVENOUS at 09:27

## 2018-05-23 RX ADMIN — HEPARIN SODIUM 3000 UNITS: 1000 INJECTION, SOLUTION INTRAVENOUS; SUBCUTANEOUS at 08:52

## 2018-05-23 RX ADMIN — EPHEDRINE SULFATE 10 MG: 50 INJECTION INTRAVENOUS at 07:41

## 2018-05-23 RX ADMIN — MIDAZOLAM 1 MG: 1 INJECTION INTRAMUSCULAR; INTRAVENOUS at 10:15

## 2018-05-23 RX ADMIN — NITROGLYCERIN 50 MCG: 5 INJECTION, SOLUTION INTRAVENOUS at 10:04

## 2018-05-23 RX ADMIN — FENTANYL CITRATE 25 MCG: 50 INJECTION, SOLUTION INTRAMUSCULAR; INTRAVENOUS at 09:45

## 2018-05-23 RX ADMIN — MIDAZOLAM 2 MG: 1 INJECTION INTRAMUSCULAR; INTRAVENOUS at 07:21

## 2018-05-23 RX ADMIN — GLYCOPYRROLATE 0.5 MG: 0.2 INJECTION, SOLUTION INTRAMUSCULAR; INTRAVENOUS at 10:05

## 2018-05-23 RX ADMIN — PREDNISONE 5 MG: 5 TABLET ORAL at 13:56

## 2018-05-23 RX ADMIN — HYDROCODONE BITARTRATE AND ACETAMINOPHEN 1 TABLET: 10; 325 TABLET ORAL at 13:17

## 2018-05-23 RX ADMIN — Medication 3 MG: at 10:05

## 2018-05-23 RX ADMIN — HYDROXYCHLOROQUINE SULFATE 200 MG: 200 TABLET, FILM COATED ORAL at 20:17

## 2018-05-23 RX ADMIN — HYDROXYCHLOROQUINE SULFATE 200 MG: 200 TABLET, FILM COATED ORAL at 13:59

## 2018-05-23 RX ADMIN — FENTANYL CITRATE 50 MCG: 50 INJECTION, SOLUTION INTRAMUSCULAR; INTRAVENOUS at 08:14

## 2018-05-23 RX ADMIN — ROCURONIUM BROMIDE 10 MG: 10 INJECTION INTRAVENOUS at 07:51

## 2018-05-23 RX ADMIN — MIDAZOLAM 1 MG: 1 INJECTION INTRAMUSCULAR; INTRAVENOUS at 08:16

## 2018-05-23 RX ADMIN — MEPERIDINE HYDROCHLORIDE 12.5 MG: 50 INJECTION INTRAMUSCULAR; INTRAVENOUS; SUBCUTANEOUS at 11:13

## 2018-05-23 RX ADMIN — HYDROMORPHONE HYDROCHLORIDE 0.5 MG: 1 INJECTION, SOLUTION INTRAMUSCULAR; INTRAVENOUS; SUBCUTANEOUS at 10:50

## 2018-05-23 RX ADMIN — ATORVASTATIN CALCIUM 20 MG: 20 TABLET, FILM COATED ORAL at 13:55

## 2018-05-23 RX ADMIN — PHENYLEPHRINE HYDROCHLORIDE 50 MCG: 10 INJECTION INTRAVENOUS at 09:49

## 2018-05-23 RX ADMIN — PHENYLEPHRINE HYDROCHLORIDE 50 MCG: 10 INJECTION INTRAVENOUS at 08:36

## 2018-05-23 RX ADMIN — PHENYLEPHRINE HYDROCHLORIDE 50 MCG: 10 INJECTION INTRAVENOUS at 09:52

## 2018-05-23 RX ADMIN — PHENYLEPHRINE HYDROCHLORIDE 50 MCG: 10 INJECTION INTRAVENOUS at 10:00

## 2018-05-23 RX ADMIN — SODIUM CHLORIDE 100 ML/HR: 9 INJECTION, SOLUTION INTRAVENOUS at 05:57

## 2018-05-23 RX ADMIN — FENTANYL CITRATE 25 MCG: 50 INJECTION, SOLUTION INTRAMUSCULAR; INTRAVENOUS at 08:35

## 2018-05-23 RX ADMIN — ROCURONIUM BROMIDE 35 MG: 10 INJECTION INTRAVENOUS at 07:37

## 2018-05-23 RX ADMIN — SODIUM CHLORIDE, SODIUM GLUCONATE, SODIUM ACETATE, POTASSIUM CHLORIDE, AND MAGNESIUM CHLORIDE: 526; 502; 368; 37; 30 INJECTION, SOLUTION INTRAVENOUS at 08:02

## 2018-05-23 RX ADMIN — PREGABALIN 150 MG: 75 CAPSULE ORAL at 13:58

## 2018-05-23 RX ADMIN — PREGABALIN 150 MG: 75 CAPSULE ORAL at 20:17

## 2018-05-23 RX ADMIN — PROPOFOL 200 MG: 10 INJECTION, EMULSION INTRAVENOUS at 07:28

## 2018-05-23 RX ADMIN — HEPARIN SODIUM 8000 UNITS: 1000 INJECTION, SOLUTION INTRAVENOUS; SUBCUTANEOUS at 08:20

## 2018-05-23 RX ADMIN — FENTANYL CITRATE 25 MCG: 50 INJECTION, SOLUTION INTRAMUSCULAR; INTRAVENOUS at 07:45

## 2018-05-23 RX ADMIN — LIDOCAINE HYDROCHLORIDE 30 MG: 20 INJECTION, SOLUTION INFILTRATION; PERINEURAL at 09:45

## 2018-05-23 RX ADMIN — CLONIDINE HYDROCHLORIDE 0.1 MG: 0.1 TABLET ORAL at 20:18

## 2018-05-23 RX ADMIN — ASPIRIN 325 MG: 325 TABLET ORAL at 12:46

## 2018-05-23 RX ADMIN — METHOCARBAMOL 750 MG: 750 TABLET ORAL at 16:34

## 2018-05-23 RX ADMIN — METFORMIN HYDROCHLORIDE 500 MG: 500 TABLET ORAL at 18:18

## 2018-05-23 RX ADMIN — NITROGLYCERIN 50 MCG: 5 INJECTION, SOLUTION INTRAVENOUS at 10:10

## 2018-05-23 RX ADMIN — METHOCARBAMOL 750 MG: 750 TABLET ORAL at 20:18

## 2018-05-23 RX ADMIN — NITROGLYCERIN 50 MCG: 5 INJECTION, SOLUTION INTRAVENOUS at 10:15

## 2018-05-23 RX ADMIN — EPHEDRINE SULFATE 5 MG: 50 INJECTION INTRAVENOUS at 09:47

## 2018-05-23 RX ADMIN — CEFAZOLIN SODIUM 2 G: 1 INJECTION, POWDER, FOR SOLUTION INTRAMUSCULAR; INTRAVENOUS at 16:32

## 2018-05-23 RX ADMIN — PHENYLEPHRINE HYDROCHLORIDE 50 MCG: 10 INJECTION INTRAVENOUS at 09:47

## 2018-05-23 RX ADMIN — PHENYLEPHRINE HYDROCHLORIDE 50 MCG: 10 INJECTION INTRAVENOUS at 08:29

## 2018-05-23 RX ADMIN — SODIUM CHLORIDE 75 ML/HR: 900 INJECTION, SOLUTION INTRAVENOUS at 12:38

## 2018-05-23 RX ADMIN — FAMOTIDINE 20 MG: 20 TABLET ORAL at 15:08

## 2018-05-23 RX ADMIN — PROPOFOL 100 MG: 10 INJECTION, EMULSION INTRAVENOUS at 09:45

## 2018-05-23 RX ADMIN — CEFAZOLIN SODIUM 2 G: 1 INJECTION, POWDER, FOR SOLUTION INTRAMUSCULAR; INTRAVENOUS at 07:39

## 2018-05-23 RX ADMIN — PROTAMINE SULFATE 20 MG: 10 INJECTION, SOLUTION INTRAVENOUS at 09:32

## 2018-05-23 RX ADMIN — PHENYLEPHRINE HYDROCHLORIDE 50 MCG: 10 INJECTION INTRAVENOUS at 09:11

## 2018-05-23 RX ADMIN — ROCURONIUM BROMIDE 5 MG: 10 INJECTION INTRAVENOUS at 07:28

## 2018-05-23 RX ADMIN — ALBUTEROL SULFATE 2.5 MG: 2.5 SOLUTION RESPIRATORY (INHALATION) at 16:02

## 2018-05-23 RX ADMIN — PHENYLEPHRINE HYDROCHLORIDE 50 MCG: 10 INJECTION INTRAVENOUS at 09:35

## 2018-05-23 RX ADMIN — SUCCINYLCHOLINE CHLORIDE 140 MG: 20 INJECTION, SOLUTION INTRAMUSCULAR; INTRAVENOUS at 07:28

## 2018-05-23 RX ADMIN — LIDOCAINE HYDROCHLORIDE 60 MG: 20 INJECTION, SOLUTION INFILTRATION; PERINEURAL at 07:28

## 2018-05-23 NOTE — PLAN OF CARE
Problem: Patient Care Overview  Goal: Plan of Care Review   05/23/18 9230   Coping/Psychosocial   Plan of Care Reviewed With patient   Plan of Care Review   Progress improving   OTHER   Outcome Summary Pt doing well. No numbness or tingling noted. C/o of neck pain of 2. Up in chair for dinner. Dressing dry and intact. MARISELA intact and draining. A&O, up with standby assist. Call light within reach, fall precautions in place.

## 2018-05-23 NOTE — ANESTHESIA POSTPROCEDURE EVALUATION
Patient: Jewel Billy    Procedure Summary     Date:  05/23/18 Room / Location:  [facility] OR 05 / [facility] OR    Anesthesia Start:  0710 Anesthesia Stop:  1030    Procedure:  RIGHT CAROTID ENDARTERECTOMY carotid cerebral arteriogram     (C-ARM#2) (Right Neck) Diagnosis:       Occlusion and stenosis of carotid artery      (Occlusion and stenosis of carotid artery [I65.29])    Surgeon:  Bayron Griffin MD Provider:  Wiley Shelton MD    Anesthesia Type:  general ASA Status:  3          Anesthesia Type: general  Last vitals  BP   143/72 (05/23/18 0548)   Temp   97.1 °F (36.2 °C) (05/23/18 0548)   Pulse   62 (05/23/18 0548)   Resp   20 (05/23/18 0548)     SpO2   97 % (05/23/18 0548)     Post Anesthesia Care and Evaluation    Patient location during evaluation: bedside  Patient participation: complete - patient participated  Level of consciousness: awake and alert  Pain score: 0  Pain management: adequate  Airway patency: patent  Anesthetic complications: No anesthetic complications  PONV Status: none  Cardiovascular status: hemodynamically stable  Respiratory status: spontaneous ventilation  Hydration status: acceptable    Comments: Moves all extremities. Follows directions.

## 2018-05-23 NOTE — OP NOTE
OPERATIVE NOTE  Jewel Billy  1955  5/23/2018    PREOP DIAGNOSES:  Occlusion and stenosis of carotid artery [I65.29]    POSTOP DIAGNOSES:  Occlusion and stenosis of carotid artery [I65.29]    PROCEDURE:   RIGHT CAROTID ENDARTERECTOMY   carotid cerebral arteriogram        SURGEON: WALTER Griffin MD FACS RPVI    ASSISTANT: Daniel Jimenez CFA    ANESTHESIA: General ET  General    ESTIMATED BLOOD LOSS: minimal    COMPLICATIONS: none    DESCRIPTION OF OPERATION:   Patient taken to the operating room placed in supine position. general endotracheal anesthesia was induced. patient prepped draped sterile fashion. oblique incision made in the right neck dissection carried down to the common carotid artery and its branches which were isolated. total of 11,000 units of intravenous heparin given to maintain an ACT around 300. distal internal carotid artery was clamped [place] clamp proximal common carotid artery with water baby clamp, external isolated with vessel loop. superior thyroid artery isolated with 0 silk tie.  Common carotid artery was opened with 11 blade and Christianson scissors across the plaque into the normal distal internal carotid artery.  diffuse plaque in the proximal portion of the internal carotid artery.  There is 90% long segment soft plaque in the proximal internal carotid artery with mild debris,  Irrigated clear with heparinized saline.  Shunt was placed distally with good backbleeding and proximally with good flow secured with vessel loops. carotid endarterectomy was performed with Ignacio elevator, eversion endarterectomy of the external carotid artery. proximal plaque was cut with Christianson scissors, distal plaque tapered nicely. loose debris was removed, irrigated with heparinized saline. pericardial patch was sewn in place with 6-0 Prolene.  Near completion of the suture line the shunt was removed and vessels reclamped. suture line was completed, usually deairing techniques were employed,  internal was flashed proximal and external clamps were removed, after 3 cardiac cycles the internal clamp was removed.  A 23-gauge butterfly needle was inserted in the proximal patch. carotid cerebral arteriogram was performed demonstrating patent internal and external carotid arteries brisk flow into the intracranial vessels, no evidence of obstruction, extravasation or dissection. needle was removed and puncture site was repaired with 7-0 Prolene.  Single 6-0 Prolene repair stitch and the suture line.  20 mg of protamine was given with return of ACT to baseline. hemostasis was obtained. 15 Sammarinese Dayday drain was placed in the wound bed and exiting the base of the neck. incision closed in layers of 3-0 Vicryl in the platysma, 4-0 Monocryl in the skin with Dermabond tape.  Awoke from anesthesia, moving all extremities, no focal deficits. patient tolerated procedure well and transferred to PACU in stable condition.          This document has been electronically signed by Bayron Griffin MD on May 23, 2018 10:05 AM

## 2018-05-23 NOTE — ANESTHESIA PROCEDURE NOTES
Airway  Urgency: elective    Date/Time: 5/23/2018 7:35 AM  Airway not difficult    General Information and Staff    Patient location during procedure: OR    Indications and Patient Condition  Indications for airway management: airway protection    Preoxygenated: yes  MILS maintained throughout  Mask difficulty assessment: 1 - vent by mask    Final Airway Details  Final airway type: endotracheal airway      Successful airway: ETT  Cuffed: yes   Successful intubation technique: direct laryngoscopy  Facilitating devices/methods: intubating stylet and cricoid pressure  Endotracheal tube insertion site: oral  Blade: Padmini  Blade size: #3  ETT size: 8.0 mm  Cormack-Lehane Classification: grade I - full view of glottis  Placement verified by: chest auscultation and capnometry   Cuff volume (mL): 10  Measured from: lips  ETT to lips (cm): 21  Number of attempts at approach: 2

## 2018-05-24 VITALS
BODY MASS INDEX: 26.93 KG/M2 | TEMPERATURE: 97.6 F | OXYGEN SATURATION: 96 % | HEART RATE: 63 BPM | SYSTOLIC BLOOD PRESSURE: 129 MMHG | DIASTOLIC BLOOD PRESSURE: 63 MMHG | WEIGHT: 198.85 LBS | HEIGHT: 72 IN | RESPIRATION RATE: 12 BRPM

## 2018-05-24 PROBLEM — G89.18 POSTOPERATIVE PAIN: Status: ACTIVE | Noted: 2018-05-24

## 2018-05-24 PROBLEM — Z48.812 SURGICAL AFTERCARE, CIRCULATORY SYSTEM: Status: ACTIVE | Noted: 2018-05-24

## 2018-05-24 LAB
LAB AP CASE REPORT: NORMAL
Lab: NORMAL
PATH REPORT.FINAL DX SPEC: NORMAL
PATH REPORT.GROSS SPEC: NORMAL

## 2018-05-24 PROCEDURE — 99024 POSTOP FOLLOW-UP VISIT: CPT | Performed by: NURSE PRACTITIONER

## 2018-05-24 PROCEDURE — 25010000003 CEFAZOLIN PER 500 MG: Performed by: THORACIC SURGERY (CARDIOTHORACIC VASCULAR SURGERY)

## 2018-05-24 PROCEDURE — 63710000001 PREDNISONE PER 5 MG: Performed by: THORACIC SURGERY (CARDIOTHORACIC VASCULAR SURGERY)

## 2018-05-24 PROCEDURE — 94799 UNLISTED PULMONARY SVC/PX: CPT

## 2018-05-24 RX ORDER — ASPIRIN 81 MG/1
81 TABLET ORAL DAILY
Start: 2018-05-25 | End: 2018-07-03 | Stop reason: SDUPTHER

## 2018-05-24 RX ORDER — FAMOTIDINE 20 MG/1
20 TABLET, FILM COATED ORAL EVERY 12 HOURS SCHEDULED
Qty: 60 TABLET | Refills: 0 | Status: SHIPPED | OUTPATIENT
Start: 2018-05-24 | End: 2018-07-03 | Stop reason: SDUPTHER

## 2018-05-24 RX ORDER — SENNA AND DOCUSATE SODIUM 50; 8.6 MG/1; MG/1
2 TABLET, FILM COATED ORAL 2 TIMES DAILY PRN
Start: 2018-05-24 | End: 2018-07-03 | Stop reason: SDUPTHER

## 2018-05-24 RX ADMIN — CEFAZOLIN SODIUM 2 G: 1 INJECTION, POWDER, FOR SOLUTION INTRAMUSCULAR; INTRAVENOUS at 00:30

## 2018-05-24 RX ADMIN — VALSARTAN 320 MG: 160 TABLET, FILM COATED ORAL at 08:14

## 2018-05-24 RX ADMIN — ASPIRIN 81 MG: 81 TABLET, COATED ORAL at 08:15

## 2018-05-24 RX ADMIN — METFORMIN HYDROCHLORIDE 500 MG: 500 TABLET ORAL at 08:14

## 2018-05-24 RX ADMIN — CLONIDINE HYDROCHLORIDE 0.1 MG: 0.1 TABLET ORAL at 08:14

## 2018-05-24 RX ADMIN — PREDNISONE 5 MG: 5 TABLET ORAL at 08:14

## 2018-05-24 RX ADMIN — ALBUTEROL SULFATE 2.5 MG: 2.5 SOLUTION RESPIRATORY (INHALATION) at 07:33

## 2018-05-24 RX ADMIN — PREGABALIN 150 MG: 75 CAPSULE ORAL at 08:15

## 2018-05-24 RX ADMIN — METHOCARBAMOL 750 MG: 750 TABLET ORAL at 08:14

## 2018-05-24 RX ADMIN — SODIUM CHLORIDE 75 ML/HR: 900 INJECTION, SOLUTION INTRAVENOUS at 00:34

## 2018-05-24 RX ADMIN — HYDROCODONE BITARTRATE AND ACETAMINOPHEN 1 TABLET: 10; 325 TABLET ORAL at 08:36

## 2018-05-24 RX ADMIN — FAMOTIDINE 20 MG: 20 TABLET ORAL at 09:38

## 2018-05-24 RX ADMIN — ATORVASTATIN CALCIUM 20 MG: 20 TABLET, FILM COATED ORAL at 08:16

## 2018-05-24 RX ADMIN — DIAZEPAM 5 MG: 5 TABLET ORAL at 03:07

## 2018-05-24 RX ADMIN — LEVOTHYROXINE SODIUM 200 MCG: 100 TABLET ORAL at 06:52

## 2018-05-24 RX ADMIN — HYDROXYCHLOROQUINE SULFATE 200 MG: 200 TABLET, FILM COATED ORAL at 08:14

## 2018-05-24 RX ADMIN — MELOXICAM 15 MG: 15 TABLET ORAL at 08:15

## 2018-05-24 NOTE — PLAN OF CARE
Problem: Patient Care Overview  Goal: Plan of Care Review   05/24/18 0536   Coping/Psychosocial   Plan of Care Reviewed With patient   Plan of Care Review   Progress improving   OTHER   Outcome Summary Pt improving, A-line DC'd, parra removed and pt up in chair, Right neck surgical incision dsg intact with no drainage noted. MARISELA drain remains in place with minimal drainage noted. Pain controlled with medication. Pt remains bradicardic with rate of 48. Pt asymtptomatic and denies CP, dizziness or SOB.      Goal: Discharge Needs Assessment   05/24/18 0536   Discharge Needs Assessment   Readmission Within the Last 30 Days no previous admission in last 30 days   Concerns to be Addressed no discharge needs identified   Patient/Family Anticipates Transition to home   Patient/Family Anticipated Services at Transition none   Transportation Anticipated family or friend will provide   Anticipated Changes Related to Illness none   Equipment Needed After Discharge none   Disability   Equipment Currently Used at Home none     Goal: Interprofessional Rounds/Family Conf  Outcome: Ongoing (interventions implemented as appropriate)   05/24/18 0536   Interdisciplinary Rounds/Family Conf   Participants patient;nursing       Problem: Pain, Acute (Adult)  Goal: Acceptable Pain Control/Comfort Level  Outcome: Ongoing (interventions implemented as appropriate)   05/24/18 0536   Pain, Acute (Adult)   Acceptable Pain Control/Comfort Level making progress toward outcome       Problem: Carotid Endarterectomy (Adult)  Goal: Signs and Symptoms of Listed Potential Problems Will be Absent, Minimized or Managed (Carotid Endarterectomy)   05/24/18 0536   Goal/Outcome Evaluation   Problems Assessed (Carotid Endarterectomy) all   Problems Present (Carotid Endarterect) pain     Goal: Anesthesia/Sedation Recovery  Outcome: Outcome(s) achieved Date Met: 05/24/18

## 2018-05-24 NOTE — DISCHARGE SUMMARY
Date of Discharge:  5/24/2018    Discharge Diagnosis:   Right carotid Stenosis SP R CEA  PO Pain controlled with routine pain medications for chronic pain from rheumatoid arthritis  HTN     Problem List:  Principal Problem:    Occlusion and stenosis of carotid artery  Active Problems:    Carotid stenosis    Surgical aftercare, circulatory system    Postoperative pain      Presenting Problem/History of Present Illness  Occlusion and stenosis of carotid artery [I65.29]  Carotid stenosis [I65.29]        Hospital Course  Patient is a 63 y.o. male presented with  HTN(stable, increased risk stroke, rupture), Hyperlipidemia(stable, increased risk cardiovascular events), Diabetes Mellitus(stable, increased risk cardiovascular events) and Smoker(uncontrolled, increased risk cardiovascular events), Carotid Stenosis(new, increased risk stroke).  smokes 1/2 PPD.  Rushing noises in head x 1 year.  Carotid bruits noted on exam.   Carotid stenosis noted on duplex imaging.  No TIA stroke amaurosis.  No MI claudication. No other associated signs, symptoms or modifying factors. Elected to proceed with elective R CEA.  Tolerated well remained neurovascular stable in OR, PACU, & CCU.  He is afebrile, RSR, without neuro deficit, ambulating independent with return of bodily functions.  PO Pain controlled with Norco 10/325.  He is stable and ready for DC in satisfactory condition, in agreement with this plan.     4/4/2018 Carotid Duplex:  AWILDA >70% (321cm/s, ratio 4.9) antegrade vert.  LICA 50-69% (221cm/s, ratio 1.9)   4/24/2018 CTA Carotids:  AWILDA 90%, LICA 80%  05/23/18  RIGHT CEA    Procedures Performed  Procedure(s):  RIGHT CAROTID ENDARTERECTOMY carotid cerebral arteriogram     (C-ARM#2)       Consults:   Consults     No orders found for last 30 day(s).          Pertinent Test Results: None PO    Condition on Discharge:  Satisfactory    Vital Signs  Temp:  [97.1 °F (36.2 °C)-98.5 °F (36.9 °C)] 97.6 °F (36.4 °C)  Heart Rate:   [45-81] 53  Resp:  [12-21] 12  BP: (107-135)/(58-76) 129/63  Arterial Line BP: ()/(50-80) 90/80  Constitutional: He is oriented to person, place, and time. He appears well-nourished.   Body mass index is 26.97 kg/m².  Head: Normocephalic.   Mouth/Throat: Oropharynx is clear and moist.   Tongue midline Facial movements symmetrical  Sensation intact including lateral to incision  Eyes: Conjunctivae and EOM are normal. Pupils are equal, round, and reactive to light.   Neck: Neck supple. No JVD present.   Cardiovascular: Normal rate, regular rhythm, normal heart sounds and intact distal pulses.    Pulses:       Radial pulses are 2+ on the right side, and 2+ on the left side.        Dorsalis pedis pulses are 2+ on the right side, and 2+ on the left side.        Posterior tibial pulses are 2+ on the right side, and 2+ on the left side.   Pulmonary/Chest: Effort normal and breath sounds normal. No stridor. No respiratory distress.   Abdominal: Soft. Bowel sounds are normal.   Musculoskeletal: Normal range of motion. He exhibits no edema or tenderness.   Symmetrical = movements    Neurological: He is alert and oriented to person, place, and time. No cranial nerve deficit.   Skin: Skin is warm and dry. Capillary refill takes less than 2 seconds. No erythema. No pallor.   R carotid incision clean and dry  Prineo wound strip in place   Psychiatric: His behavior is normal. Judgment normal.       Discharge Disposition  Home or Self Care    Discharge Medications   Jewel Billy   Home Medication Instructions KAREN:[id number]    Printed on:05/24/18 2938   Medication Information                      aspirin 81 MG EC tablet  Take 1 tablet by mouth Daily.             candesartan (ATACAND) 32 MG tablet  Take 32 mg by mouth Daily.             Cholecalciferol (VITAMIN D) 2000 units capsule  Take 2,000 Units by mouth 2 (Two) Times a Day.             CloNIDine (CATAPRES) 0.1 MG tablet  Take 0.1 mg by mouth 3 (Three) Times a  Day As Needed.             diazePAM (VALIUM) 5 MG tablet  Take 5 mg by mouth 2 (Two) Times a Day As Needed for Anxiety.             famciclovir (FAMVIR) 500 MG tablet  Take 500 mg by mouth Every 8 (Eight) Hours As Needed.             famotidine (PEPCID) 20 MG tablet  Take 1 tablet by mouth Every 12 (Twelve) Hours.             HYDROcodone-acetaminophen (NORCO)  MG per tablet  Take 1 tablet by mouth 3 (Three) Times a Day As Needed for Moderate Pain .             hydroxychloroquine (PLAQUENIL) 200 MG tablet  Take 200 mg by mouth 2 (Two) Times a Day.             levothyroxine (SYNTHROID, LEVOTHROID) 200 MCG tablet  Take 200 mcg by mouth Daily. No med changes after last level check             meloxicam (MOBIC) 15 MG tablet  Take 15 mg by mouth Daily.             metFORMIN (GLUCOPHAGE) 500 MG tablet  Take 500 mg by mouth 2 (Two) Times a Day With Meals.             methocarbamol (ROBAXIN) 750 MG tablet  Take 750 mg by mouth 3 (Three) Times a Day. 1-2 tablets             pravastatin (PRAVACHOL) 40 MG tablet  Take 40 mg by mouth Daily.             predniSONE (DELTASONE) 5 MG tablet  Take 5 mg by mouth Daily.             pregabalin (LYRICA) 150 MG capsule  Take 150 mg by mouth 2 (Two) Times a Day.             sennosides-docusate sodium (SENOKOT-S) 8.6-50 MG tablet  Take 2 tablets by mouth 2 (Two) Times a Day As Needed for Constipation.             No pain medication provided.      Discharge Diet   Diet Instructions     Diet:       Diet Texture / Consistency:  Regular    Common Modifiers:   Cardiac  Consistent Carbohydrate             Activity at Discharge  Activity Instructions     Discharge Activity       1) No driving for 1-2  weeks and no longer taking narcotics.   2) Return to usual routine   3) Shower daily  Change band aid daily  Remove and leave open to air.    4) Do not lift / push / pull more then 10 lbs for 1-2 wks.          Follow-up Appointments  Future Appointments  Date Time Provider Department Center    6/1/2018 11:40 AM YV Miller [facility] None     Additional Instructions for the Follow-ups that You Need to Schedule     Discharge Follow-up with Specified Provider: Gwen Arevalo; 1 Week    As directed      To:  Gwen Arevalo    Follow Up:  1 Week    Follow Up Details:  6/1/18 1140         Notify Physician or Go To The ED For the Following Conditions    As directed      Signs and symptoms of infection including drainage from operative site, redness, swelling, with associated fever and/or chills notify [facility] immediately for wound check.   If you should experience any neurological symptoms including but not limited to visual or speech disturbances confusion, seizures, or weakness of limbs of one side of your body notify [facility] center immediately for evaluation or if after hours present to the nearest Emergency Department.    Order Comments:  Signs and symptoms of infection including drainage from operative site, redness, swelling, with associated fever and/or chills notify [facility] center immediately for wound check. If you should experience any neurological symptoms including but not limited to visual or speech disturbances confusion, seizures, or weakness of limbs of one side of your body notify [facility] center immediately for evaluation or if after hours present to the nearest Emergency Department.                Test Results Pending at Discharge   Order Current Status    Tissue Pathology Exam In process          Time: Discharge 41 min          This document has been electronically signed by VY Vázquez on May 24, 2018 9:29 AM

## 2018-05-24 NOTE — PROGRESS NOTES
Cardiothoracic - Vascular Surgery Daily Note        LOS: 1 day   POD# 1   R CEA       Chief Complaint: VAS 4 Surgical neck pain   Denies any neurosensory symptoms       Subjective       VAS 4    ROS:    Review of Systems   Constitution: Negative for chills, decreased appetite, fever, weakness and malaise/fatigue.   HENT: Negative for hearing loss, hoarse voice, nosebleeds and stridor.    Eyes: Negative for visual disturbance.   Cardiovascular: Negative for chest pain, claudication, cyanosis, dyspnea on exertion, irregular heartbeat, leg swelling and near-syncope.   Respiratory: Negative for cough, hemoptysis and shortness of breath.    Hematologic/Lymphatic: Negative for bleeding problem. Does not bruise/bleed easily.   Skin: Negative for flushing, itching, poor wound healing and rash.   Musculoskeletal: Positive for arthritis, back pain and joint pain. Negative for falls, muscle cramps and muscle weakness.        Rh arthritis for which he takes Norco 10/325   Gastrointestinal: Negative for abdominal pain, anorexia, hematemesis and melena.        Expelling flatus    Genitourinary: Negative for hematuria.   Neurological: Negative for brief paralysis, difficulty with concentration, light-headedness, loss of balance, numbness and paresthesias.             Denies any amaurosis fugae, paresthesias, paralysis, change in speech, or neurosensory symptoms.  Denies any rhytmic pulsation in ears   Psychiatric/Behavioral: Negative for altered mental status.   Allergic/Immunologic: Negative for hives.         Objective     Vital Signs  Temp:  [97.1 °F (36.2 °C)-98.5 °F (36.9 °C)] 97.8 °F (36.6 °C)  Heart Rate:  [45-81] 53  Resp:  [12-21] 12  BP: (107-135)/(58-76) 129/63  Arterial Line BP: ()/(50-80) 90/80  Body mass index is 26.97 kg/m².    Intake/Output Summary (Last 24 hours) at 05/24/18 0842  Last data filed at 05/24/18 0600   Gross per 24 hour   Intake             2670 ml   Output             3038 ml   Net              -368 ml     No intake/output data recorded.    Wt Readings from Last 3 Encounters:   05/24/18 90.2 kg (198 lb 13.7 oz)   05/17/18 91.9 kg (202 lb 8 oz)   04/30/18 93.5 kg (206 lb 3.2 oz)         Physical Exam   Physical Exam   Constitutional: He is oriented to person, place, and time. He appears well-nourished.   Body mass index is 26.97 kg/m².     HENT:   Head: Normocephalic.   Mouth/Throat: Oropharynx is clear and moist.   Tongue midline Facial movements symmetrical  Sensation intact including lateral to incision     Eyes: Conjunctivae and EOM are normal. Pupils are equal, round, and reactive to light.   Neck: Neck supple. No JVD present.   Cardiovascular: Normal rate, regular rhythm, normal heart sounds and intact distal pulses.    Pulses:       Radial pulses are 2+ on the right side, and 2+ on the left side.        Dorsalis pedis pulses are 2+ on the right side, and 2+ on the left side.        Posterior tibial pulses are 2+ on the right side, and 2+ on the left side.   Pulmonary/Chest: Effort normal and breath sounds normal. No stridor. No respiratory distress.   Abdominal: Soft. Bowel sounds are normal.   Musculoskeletal: Normal range of motion. He exhibits no edema or tenderness.   Symmetrical = movements      Neurological: He is alert and oriented to person, place, and time. No cranial nerve deficit.   Skin: Skin is warm and dry. Capillary refill takes less than 2 seconds. No erythema. No pallor.   R carotid incision clean and dry  Prineo wound strip in place   MARISELA removed bryson well    Psychiatric: His behavior is normal. Judgment normal.   Nursing note and vitals reviewed.        Results Review:    Lab Results   Component Value Date    GLUCOSE 141 (H) 05/17/2018    BUN 16 05/17/2018    CREATININE 1.07 05/17/2018    EGFRIFNONA 70 05/17/2018    BCR 15.0 05/17/2018    K 4.9 05/17/2018    CO2 25.0 05/17/2018    CALCIUM 9.9 05/17/2018                                 PT/INR:  No results found for: PROTIME/No  results found for: INR            albuterol 2.5 mg Nebulization Q8H - RT   aspirin 81 mg Oral Daily   atorvastatin 20 mg Oral Daily   CloNIDine 0.1 mg Oral TID   famotidine 20 mg Intravenous Q12H   Or      famotidine 20 mg Oral Q12H   hydroxychloroquine 200 mg Oral BID   levothyroxine 200 mcg Oral Q AM   meloxicam 15 mg Oral Daily   metFORMIN 500 mg Oral BID With Meals   methocarbamol 750 mg Oral TID   predniSONE 5 mg Oral Daily   pregabalin 150 mg Oral Q12H   valsartan 320 mg Oral Q24H       nitroglycerin 5-200 mcg/min    sodium chloride 75 mL/hr Last Rate: 75 mL/hr (05/24/18 0034)       Patient Active Problem List   Diagnosis Code   • Occlusion and stenosis of carotid artery I65.29   • Nicotine dependence, cigarettes, with other nicotine-induced disorders F17.218   • Bilateral carotid artery stenosis I65.23   • Benign essential HTN I10   • Mixed hyperlipidemia E78.2   • Controlled type 2 diabetes mellitus with diabetic polyneuropathy, without long-term current use of insulin E11.42   • Carotid stenosis I65.29         ASSESSMENT/PLAN     Satisfactory PO:  Ambulating independently with return of bodily functions.     R carotid Stenosis SP RCEA:  No neurosensory symptoms  Recommend continue medical management with antiplatelet therapy, and statin therapy.  Secondary ACE  Vitamin D has been shown to promote healthy lining of arteries.  Check level and replace as needed  Recommend life style changes/Risk factor modification:   Recommend smoking cessation.  Pt plans to continue not smokling.  Regular exercise morning:  Bicycling or Walking total of 45 minutes per day in minimum of 15 minute intervals    Heart Healthy diet, consistent carbohydrates (sugar, white foods bread/rice/potatoes) with regular exercise.     Pain PO:  Controlled with 30 my hydrocodone in 24 hrs.  Declines pain pills as has pain contract.     Disp:  Discharge home with FU appointment 6/1/18 8537

## 2018-06-01 ENCOUNTER — OFFICE VISIT (OUTPATIENT)
Dept: CARDIAC SURGERY | Facility: CLINIC | Age: 63
End: 2018-06-01

## 2018-06-01 VITALS
TEMPERATURE: 98.5 F | DIASTOLIC BLOOD PRESSURE: 91 MMHG | BODY MASS INDEX: 28.09 KG/M2 | HEIGHT: 72 IN | OXYGEN SATURATION: 97 % | HEART RATE: 78 BPM | WEIGHT: 207.4 LBS | SYSTOLIC BLOOD PRESSURE: 166 MMHG

## 2018-06-01 DIAGNOSIS — Z48.812 SURGICAL AFTERCARE, CIRCULATORY SYSTEM: Primary | ICD-10-CM

## 2018-06-01 DIAGNOSIS — I65.23 BILATERAL CAROTID ARTERY STENOSIS: ICD-10-CM

## 2018-06-01 PROCEDURE — 99024 POSTOP FOLLOW-UP VISIT: CPT | Performed by: NURSE PRACTITIONER

## 2018-06-01 NOTE — PATIENT INSTRUCTIONS
Shower daily  No surgical restrictions  Congrats on not increasing smoking  Meds that reduce vascular disease:  Aspirin atacand pravastatin Vit D   Lifestyle continue smoking reduction and heart healthy diet.   Left carotid surgery 6/27/18  SDS instructions provided.

## 2018-06-05 ENCOUNTER — PREP FOR SURGERY (OUTPATIENT)
Dept: OTHER | Facility: HOSPITAL | Age: 63
End: 2018-06-05

## 2018-06-05 DIAGNOSIS — E55.9 VITAMIN D DEFICIENCY: ICD-10-CM

## 2018-06-05 DIAGNOSIS — I65.22 LEFT CAROTID STENOSIS: Primary | ICD-10-CM

## 2018-06-05 RX ORDER — SODIUM CHLORIDE 9 MG/ML
100 INJECTION, SOLUTION INTRAVENOUS CONTINUOUS
Status: CANCELLED | OUTPATIENT
Start: 2018-06-27

## 2018-06-05 RX ORDER — BUPIVACAINE HCL/0.9 % NACL/PF 0.1 %
2 PLASTIC BAG, INJECTION (ML) EPIDURAL ONCE
Status: CANCELLED | OUTPATIENT
Start: 2018-06-27 | End: 2018-06-27

## 2018-06-05 RX ORDER — SODIUM CHLORIDE 0.9 % (FLUSH) 0.9 %
1-10 SYRINGE (ML) INJECTION AS NEEDED
Status: CANCELLED | OUTPATIENT
Start: 2018-06-27

## 2018-06-05 NOTE — PROGRESS NOTES
Subjective   Patient ID: Jewel Billy is a 63 y.o. male is here today for surgical  follow-up SP R CEA with known bilateral carotid stenosis.  Chief Complaint   Patient presents with   • Carotid Artery Disease     post op R CEA, 5/23/18   Surgical pain near resolved.  Denies any neurosensory symptoms.  Wants to proceed with LCEA     History of Present Illness  PCP:  Kelsey MCCLELLAN, Kaylee MCCLELLAN     62 y.o. male with HTN(stable, increased risk stroke, rupture), Hyperlipidemia(stable, increased risk cardiovascular events), Diabetes Mellitus(stable, increased risk cardiovascular events) and Smoker(uncontrolled, increased risk cardiovascular events), Carotid Stenosis(new, increased risk stroke).  smokes 1/2 PPD.  Rushing noises in head x 1 year.  Carotid bruits noted on exam.   Carotid stenosis noted on duplex imaging.  No TIA stroke amaurosis.  No MI claudication. No other associated signs, symptoms or modifying factors.  Feels is recovering well from RCEA and desires to proceed with L CEA.       4/4/2018 Carotid Duplex:  AWILDA >70% (321cm/s, ratio 4.9) antegrade vert.  LICA 50-69% (221cm/s, ratio 1.9)   4/24/2018 CTA Carotids:  AWILDA 90%, LICA 80%  5/23/18  RIGHT CEA  5/24/18  Hospital discharge    The following portions of the patient's history were reviewed and updated as appropriate: allergies, current medications, past family history, past medical history, past social history, past surgical history and problem list.  Allergies   Allergen Reactions   • Vancomycin Unknown (See Comments)     Pt states had accident in 1980's and mother told him he had a reaction to this medication during that time       Current Outpatient Prescriptions:   •  aspirin 81 MG EC tablet, Take 1 tablet by mouth Daily., Disp: , Rfl:   •  candesartan (ATACAND) 32 MG tablet, Take 32 mg by mouth Daily., Disp: , Rfl:   •  Cholecalciferol (VITAMIN D) 2000 units capsule, Take 2,000 Units by mouth 2 (Two) Times a Day., Disp: , Rfl:    •  CloNIDine (CATAPRES) 0.1 MG tablet, Take 0.1 mg by mouth 3 (Three) Times a Day As Needed., Disp: , Rfl:   •  diazePAM (VALIUM) 5 MG tablet, Take 5 mg by mouth 2 (Two) Times a Day As Needed for Anxiety., Disp: , Rfl:   •  famciclovir (FAMVIR) 500 MG tablet, Take 500 mg by mouth Every 8 (Eight) Hours As Needed., Disp: , Rfl:   •  famotidine (PEPCID) 20 MG tablet, Take 1 tablet by mouth Every 12 (Twelve) Hours., Disp: 60 tablet, Rfl: 0  •  HYDROcodone-acetaminophen (NORCO)  MG per tablet, Take 1 tablet by mouth 3 (Three) Times a Day As Needed for Moderate Pain ., Disp: , Rfl:   •  hydroxychloroquine (PLAQUENIL) 200 MG tablet, Take 200 mg by mouth 2 (Two) Times a Day., Disp: , Rfl:   •  levothyroxine (SYNTHROID, LEVOTHROID) 200 MCG tablet, Take 200 mcg by mouth Daily. No med changes after last level check, Disp: , Rfl:   •  meloxicam (MOBIC) 15 MG tablet, Take 15 mg by mouth Daily., Disp: , Rfl:   •  metFORMIN (GLUCOPHAGE) 500 MG tablet, Take 500 mg by mouth 2 (Two) Times a Day With Meals., Disp: , Rfl:   •  methocarbamol (ROBAXIN) 750 MG tablet, Take 750 mg by mouth 3 (Three) Times a Day. 1-2 tablets, Disp: , Rfl:   •  pravastatin (PRAVACHOL) 40 MG tablet, Take 40 mg by mouth Daily., Disp: , Rfl:   •  predniSONE (DELTASONE) 5 MG tablet, Take 5 mg by mouth Daily., Disp: , Rfl:   •  pregabalin (LYRICA) 150 MG capsule, Take 150 mg by mouth 2 (Two) Times a Day., Disp: , Rfl:   •  sennosides-docusate sodium (SENOKOT-S) 8.6-50 MG tablet, Take 2 tablets by mouth 2 (Two) Times a Day As Needed for Constipation., Disp: , Rfl:   Current outpatient and discharge medications have been reconciled for the patient.  Reviewed by: VY Miller    Review of Systems   Constitution: Negative for chills, decreased appetite, fever and malaise/fatigue.   HENT: Negative for hearing loss, hoarse voice, nosebleeds, sore throat and stridor.    Eyes: Negative for visual disturbance.   Cardiovascular: Negative for chest pain,  cyanosis, dyspnea on exertion, leg swelling, near-syncope and syncope.   Respiratory: Positive for cough. Negative for hemoptysis, shortness of breath and wheezing.         Continues to smoke   Hematologic/Lymphatic: Negative for bleeding problem. Does not bruise/bleed easily.   Skin: Negative for color change, flushing, poor wound healing and rash.   Musculoskeletal: Negative for falls, muscle cramps and muscle weakness.   Gastrointestinal: Negative for change in bowel habit, nausea and vomiting.   Genitourinary: Negative for hematuria.   Neurological: Negative for brief paralysis, focal weakness, headaches, light-headedness, numbness, paresthesias and sensory change.             Denies any amaurosis fugae, paresthesias, paralysis, change in speech, or neurosensory symptoms.  Denies any rhythmic pulsation in ears     Psychiatric/Behavioral: Negative for altered mental status. The patient is not nervous/anxious.    Allergic/Immunologic: Negative for hives.        Objective   Physical Exam   Constitutional: He is oriented to person, place, and time. He appears well-nourished. No distress.   Body mass index is 28.13 kg/m².     HENT:   Head: Normocephalic.   Mouth/Throat: Oropharynx is clear and moist.   Tongue midline Facial movements symmetrical Sensation intact including proximal to incisional line      Eyes: Conjunctivae and EOM are normal. Pupils are equal, round, and reactive to light.   Neck: Neck supple. No JVD present.   Cardiovascular: Normal rate, regular rhythm, normal heart sounds and intact distal pulses.    Pulses:       Carotid pulses are 1+ on the right side, and 1+ on the left side with bruit.       Radial pulses are 2+ on the right side, and 2+ on the left side.        Posterior tibial pulses are 2+ on the right side, and 2+ on the left side.   Pulmonary/Chest: Effort normal and breath sounds normal. No stridor. He has no wheezes. He has no rales.   Abdominal: Soft. Bowel sounds are normal.    Musculoskeletal: He exhibits no edema or tenderness.   Symmetrical = movements      Neurological: He is alert and oriented to person, place, and time. No cranial nerve deficit.   Skin: Skin is warm and dry. Capillary refill takes less than 2 seconds. No rash noted. No erythema. No pallor.   R cea incision healing well Prineo strip removed Harvinder well  Remains well approximated.    Psychiatric: His behavior is normal. Judgment normal.   Nursing note and vitals reviewed.      Vitals:    06/01/18 1202   BP: 166/91   Pulse: 78   Temp: 98.5 °F (36.9 °C)   SpO2: 97%         Assessment/Plan   Independent Review of Radiographic Studies:    None with today's visit     1. Surgical aftercare, circulatory system  Recovering well  Increase activities  May drive.  No Limitations after 6/7/18      2. Bilateral carotid artery stenosis  Recover from R CEA  Wishes to proceed with L CEA.  Dr Griffin visited today  Risks of surgery, potential for complications, and hoped for benefits discussed  Pt understands and wishes to proceed.  Plan LEFT CEA on 6/27/18 by Dr Griffin   - Duplex Carotid Ultrasound CAR; Future  - Case request  -Orders completed.

## 2018-06-27 ENCOUNTER — HOSPITAL ENCOUNTER (INPATIENT)
Facility: HOSPITAL | Age: 63
LOS: 1 days | Discharge: HOME | End: 2018-06-28
Attending: THORACIC SURGERY (CARDIOTHORACIC VASCULAR SURGERY) | Admitting: THORACIC SURGERY (CARDIOTHORACIC VASCULAR SURGERY)

## 2018-06-27 ENCOUNTER — ANESTHESIA (OUTPATIENT)
Dept: PERIOP | Facility: HOSPITAL | Age: 63
End: 2018-06-27

## 2018-06-27 ENCOUNTER — ANESTHESIA EVENT (OUTPATIENT)
Dept: PERIOP | Facility: HOSPITAL | Age: 63
End: 2018-06-27

## 2018-06-27 ENCOUNTER — APPOINTMENT (OUTPATIENT)
Dept: GENERAL RADIOLOGY | Facility: HOSPITAL | Age: 63
End: 2018-06-27

## 2018-06-27 DIAGNOSIS — E55.9 VITAMIN D DEFICIENCY: ICD-10-CM

## 2018-06-27 DIAGNOSIS — I65.22 LEFT CAROTID STENOSIS: ICD-10-CM

## 2018-06-27 DIAGNOSIS — I65.23 BILATERAL CAROTID ARTERY STENOSIS: ICD-10-CM

## 2018-06-27 LAB
25(OH)D3 SERPL-MCNC: 44.5 NG/ML (ref 30–100)
ABO GROUP BLD: NORMAL
ALBUMIN SERPL-MCNC: 4.4 G/DL (ref 3.4–4.8)
ALBUMIN/GLOB SERPL: 1.5 G/DL (ref 1.1–1.8)
ALP SERPL-CCNC: 35 U/L (ref 38–126)
ALT SERPL W P-5'-P-CCNC: 27 U/L (ref 21–72)
ANION GAP SERPL CALCULATED.3IONS-SCNC: 9 MMOL/L (ref 5–15)
AST SERPL-CCNC: 21 U/L (ref 17–59)
BILIRUB SERPL-MCNC: 0.3 MG/DL (ref 0.2–1.3)
BLD GP AB SCN SERPL QL: NEGATIVE
BUN BLD-MCNC: 12 MG/DL (ref 7–21)
BUN/CREAT SERPL: 13 (ref 7–25)
CALCIUM SPEC-SCNC: 9.3 MG/DL (ref 8.4–10.2)
CHLORIDE SERPL-SCNC: 103 MMOL/L (ref 95–110)
CO2 SERPL-SCNC: 24 MMOL/L (ref 22–31)
CREAT BLD-MCNC: 0.92 MG/DL (ref 0.7–1.3)
DEPRECATED RDW RBC AUTO: 45.6 FL (ref 35.1–43.9)
ERYTHROCYTE [DISTWIDTH] IN BLOOD BY AUTOMATED COUNT: 14.6 % (ref 11.5–14.5)
GFR SERPL CREATININE-BSD FRML MDRD: 83 ML/MIN/1.73 (ref 49–113)
GLOBULIN UR ELPH-MCNC: 2.9 GM/DL (ref 2.3–3.5)
GLUCOSE BLD-MCNC: 141 MG/DL (ref 60–100)
GLUCOSE BLDC GLUCOMTR-MCNC: 153 MG/DL (ref 70–130)
GLUCOSE BLDC GLUCOMTR-MCNC: 209 MG/DL (ref 70–130)
HCT VFR BLD AUTO: 40.2 % (ref 39–49)
HGB BLD-MCNC: 13.8 G/DL (ref 13.7–17.3)
Lab: NORMAL
MCH RBC QN AUTO: 29.1 PG (ref 26.5–34)
MCHC RBC AUTO-ENTMCNC: 34.3 G/DL (ref 31.5–36.3)
MCV RBC AUTO: 84.8 FL (ref 80–98)
PLATELET # BLD AUTO: 240 10*3/MM3 (ref 150–450)
PMV BLD AUTO: 9.6 FL (ref 8–12)
POTASSIUM BLD-SCNC: 3.9 MMOL/L (ref 3.5–5.1)
PROT SERPL-MCNC: 7.3 G/DL (ref 6.3–8.6)
RBC # BLD AUTO: 4.74 10*6/MM3 (ref 4.37–5.74)
RH BLD: NEGATIVE
SODIUM BLD-SCNC: 136 MMOL/L (ref 137–145)
T&S EXPIRATION DATE: NORMAL
WBC NRBC COR # BLD: 11.21 10*3/MM3 (ref 3.2–9.8)

## 2018-06-27 PROCEDURE — 25010000002 NEOSTIGMINE 4 MG/4ML SOLUTION PREFILLED SYRINGE: Performed by: NURSE ANESTHETIST, CERTIFIED REGISTERED

## 2018-06-27 PROCEDURE — 88304 TISSUE EXAM BY PATHOLOGIST: CPT | Performed by: THORACIC SURGERY (CARDIOTHORACIC VASCULAR SURGERY)

## 2018-06-27 PROCEDURE — 85027 COMPLETE CBC AUTOMATED: CPT | Performed by: NURSE PRACTITIONER

## 2018-06-27 PROCEDURE — 25010000002 IOPAMIDOL 61 % SOLUTION: Performed by: THORACIC SURGERY (CARDIOTHORACIC VASCULAR SURGERY)

## 2018-06-27 PROCEDURE — 25010000002 ONDANSETRON PER 1 MG: Performed by: NURSE ANESTHETIST, CERTIFIED REGISTERED

## 2018-06-27 PROCEDURE — 63710000001 PREDNISONE PER 5 MG: Performed by: THORACIC SURGERY (CARDIOTHORACIC VASCULAR SURGERY)

## 2018-06-27 PROCEDURE — C1768 GRAFT, VASCULAR: HCPCS | Performed by: THORACIC SURGERY (CARDIOTHORACIC VASCULAR SURGERY)

## 2018-06-27 PROCEDURE — 03UL0KZ SUPPLEMENT LEFT INTERNAL CAROTID ARTERY WITH NONAUTOLOGOUS TISSUE SUBSTITUTE, OPEN APPROACH: ICD-10-PCS | Performed by: THORACIC SURGERY (CARDIOTHORACIC VASCULAR SURGERY)

## 2018-06-27 PROCEDURE — 25010000002 PROPOFOL 10 MG/ML EMULSION: Performed by: NURSE ANESTHETIST, CERTIFIED REGISTERED

## 2018-06-27 PROCEDURE — 03CL0ZZ EXTIRPATION OF MATTER FROM LEFT INTERNAL CAROTID ARTERY, OPEN APPROACH: ICD-10-PCS | Performed by: THORACIC SURGERY (CARDIOTHORACIC VASCULAR SURGERY)

## 2018-06-27 PROCEDURE — 35301 RECHANNELING OF ARTERY: CPT | Performed by: THORACIC SURGERY (CARDIOTHORACIC VASCULAR SURGERY)

## 2018-06-27 PROCEDURE — 25010000002 HYDROCORTISONE SODIUM SUCCINATE 100 MG RECONSTITUTED SOLUTION: Performed by: NURSE ANESTHETIST, CERTIFIED REGISTERED

## 2018-06-27 PROCEDURE — 94799 UNLISTED PULMONARY SVC/PX: CPT

## 2018-06-27 PROCEDURE — 80053 COMPREHEN METABOLIC PANEL: CPT | Performed by: NURSE PRACTITIONER

## 2018-06-27 PROCEDURE — 86901 BLOOD TYPING SEROLOGIC RH(D): CPT | Performed by: NURSE PRACTITIONER

## 2018-06-27 PROCEDURE — 25010000002 HEPARIN (PORCINE) PER 1000 UNITS: Performed by: THORACIC SURGERY (CARDIOTHORACIC VASCULAR SURGERY)

## 2018-06-27 PROCEDURE — 25010000002 PROTAMINE SULFATE PER 10 MG: Performed by: NURSE ANESTHETIST, CERTIFIED REGISTERED

## 2018-06-27 PROCEDURE — 88304 TISSUE EXAM BY PATHOLOGIST: CPT | Performed by: PATHOLOGY

## 2018-06-27 PROCEDURE — 76000 FLUOROSCOPY <1 HR PHYS/QHP: CPT

## 2018-06-27 PROCEDURE — 86900 BLOOD TYPING SEROLOGIC ABO: CPT | Performed by: NURSE PRACTITIONER

## 2018-06-27 PROCEDURE — 25010000002 MIDAZOLAM PER 1 MG: Performed by: NURSE ANESTHETIST, CERTIFIED REGISTERED

## 2018-06-27 PROCEDURE — 82962 GLUCOSE BLOOD TEST: CPT

## 2018-06-27 PROCEDURE — 25010000002 CEFAZOLIN PER 500 MG: Performed by: THORACIC SURGERY (CARDIOTHORACIC VASCULAR SURGERY)

## 2018-06-27 PROCEDURE — 25010000002 FENTANYL CITRATE (PF) 100 MCG/2ML SOLUTION: Performed by: NURSE ANESTHETIST, CERTIFIED REGISTERED

## 2018-06-27 PROCEDURE — 25010000002 HEPARIN (PORCINE) PER 1000 UNITS: Performed by: NURSE ANESTHETIST, CERTIFIED REGISTERED

## 2018-06-27 PROCEDURE — 82306 VITAMIN D 25 HYDROXY: CPT | Performed by: NURSE PRACTITIONER

## 2018-06-27 PROCEDURE — 25010000002 KETOROLAC TROMETHAMINE PER 15 MG: Performed by: THORACIC SURGERY (CARDIOTHORACIC VASCULAR SURGERY)

## 2018-06-27 PROCEDURE — 86850 RBC ANTIBODY SCREEN: CPT | Performed by: NURSE PRACTITIONER

## 2018-06-27 DEVICE — PTCH VASC XENOSURE BIOLOGIC 1X6CM: Type: IMPLANTABLE DEVICE | Site: CAROTID | Status: FUNCTIONAL

## 2018-06-27 RX ORDER — FENTANYL CITRATE 50 UG/ML
INJECTION, SOLUTION INTRAMUSCULAR; INTRAVENOUS AS NEEDED
Status: DISCONTINUED | OUTPATIENT
Start: 2018-06-27 | End: 2018-06-27 | Stop reason: SURG

## 2018-06-27 RX ORDER — BUPIVACAINE HCL/0.9 % NACL/PF 0.1 %
2 PLASTIC BAG, INJECTION (ML) EPIDURAL EVERY 8 HOURS
Status: COMPLETED | OUTPATIENT
Start: 2018-06-27 | End: 2018-06-27

## 2018-06-27 RX ORDER — FAMOTIDINE 20 MG/1
20 TABLET, FILM COATED ORAL EVERY 12 HOURS SCHEDULED
Status: DISCONTINUED | OUTPATIENT
Start: 2018-06-27 | End: 2018-06-27 | Stop reason: SDUPTHER

## 2018-06-27 RX ORDER — ASPIRIN 81 MG/1
81 TABLET ORAL DAILY
Status: DISCONTINUED | OUTPATIENT
Start: 2018-06-27 | End: 2018-06-28 | Stop reason: HOSPADM

## 2018-06-27 RX ORDER — SODIUM CHLORIDE 9 MG/ML
INJECTION, SOLUTION INTRAVENOUS AS NEEDED
Status: DISCONTINUED | OUTPATIENT
Start: 2018-06-27 | End: 2018-06-28 | Stop reason: HOSPADM

## 2018-06-27 RX ORDER — ONDANSETRON 2 MG/ML
4 INJECTION INTRAMUSCULAR; INTRAVENOUS EVERY 6 HOURS PRN
Status: DISCONTINUED | OUTPATIENT
Start: 2018-06-27 | End: 2018-06-28 | Stop reason: HOSPADM

## 2018-06-27 RX ORDER — NITROGLYCERIN 40 MG/100ML
5-200 INJECTION INTRAVENOUS
Status: DISCONTINUED | OUTPATIENT
Start: 2018-06-27 | End: 2018-06-28 | Stop reason: HOSPADM

## 2018-06-27 RX ORDER — VECURONIUM BROMIDE 20 MG/20ML
INJECTION, POWDER, LYOPHILIZED, FOR SOLUTION INTRAVENOUS AS NEEDED
Status: DISCONTINUED | OUTPATIENT
Start: 2018-06-27 | End: 2018-06-27 | Stop reason: SURG

## 2018-06-27 RX ORDER — SODIUM CHLORIDE 0.9 % (FLUSH) 0.9 %
1-10 SYRINGE (ML) INJECTION AS NEEDED
Status: DISCONTINUED | OUTPATIENT
Start: 2018-06-27 | End: 2018-06-27 | Stop reason: HOSPADM

## 2018-06-27 RX ORDER — ONDANSETRON 2 MG/ML
4 INJECTION INTRAMUSCULAR; INTRAVENOUS ONCE AS NEEDED
Status: DISCONTINUED | OUTPATIENT
Start: 2018-06-27 | End: 2018-06-27 | Stop reason: HOSPADM

## 2018-06-27 RX ORDER — MIDAZOLAM HYDROCHLORIDE 1 MG/ML
INJECTION INTRAMUSCULAR; INTRAVENOUS AS NEEDED
Status: DISCONTINUED | OUTPATIENT
Start: 2018-06-27 | End: 2018-06-27

## 2018-06-27 RX ORDER — VALSARTAN 160 MG/1
320 TABLET ORAL
Status: DISCONTINUED | OUTPATIENT
Start: 2018-06-27 | End: 2018-06-28 | Stop reason: HOSPADM

## 2018-06-27 RX ORDER — SENNA AND DOCUSATE SODIUM 50; 8.6 MG/1; MG/1
2 TABLET, FILM COATED ORAL 2 TIMES DAILY PRN
Status: DISCONTINUED | OUTPATIENT
Start: 2018-06-27 | End: 2018-06-28 | Stop reason: HOSPADM

## 2018-06-27 RX ORDER — KETOROLAC TROMETHAMINE 30 MG/ML
30 INJECTION, SOLUTION INTRAMUSCULAR; INTRAVENOUS ONCE
Status: COMPLETED | OUTPATIENT
Start: 2018-06-27 | End: 2018-06-27

## 2018-06-27 RX ORDER — MELOXICAM 15 MG/1
15 TABLET ORAL DAILY
Status: DISCONTINUED | OUTPATIENT
Start: 2018-06-27 | End: 2018-06-28 | Stop reason: HOSPADM

## 2018-06-27 RX ORDER — HYDROXYCHLOROQUINE SULFATE 200 MG/1
200 TABLET, FILM COATED ORAL 2 TIMES DAILY
Status: DISCONTINUED | OUTPATIENT
Start: 2018-06-27 | End: 2018-06-28 | Stop reason: HOSPADM

## 2018-06-27 RX ORDER — METHOCARBAMOL 750 MG/1
750 TABLET, FILM COATED ORAL 3 TIMES DAILY
Status: DISCONTINUED | OUTPATIENT
Start: 2018-06-27 | End: 2018-06-28 | Stop reason: HOSPADM

## 2018-06-27 RX ORDER — FAMOTIDINE 10 MG/ML
20 INJECTION, SOLUTION INTRAVENOUS EVERY 12 HOURS SCHEDULED
Status: DISCONTINUED | OUTPATIENT
Start: 2018-06-27 | End: 2018-06-28 | Stop reason: HOSPADM

## 2018-06-27 RX ORDER — ONDANSETRON 4 MG/1
4 TABLET, FILM COATED ORAL EVERY 6 HOURS PRN
Status: DISCONTINUED | OUTPATIENT
Start: 2018-06-27 | End: 2018-06-28 | Stop reason: HOSPADM

## 2018-06-27 RX ORDER — BISACODYL 10 MG
10 SUPPOSITORY, RECTAL RECTAL DAILY PRN
Status: DISCONTINUED | OUTPATIENT
Start: 2018-06-27 | End: 2018-06-28 | Stop reason: HOSPADM

## 2018-06-27 RX ORDER — ALBUTEROL SULFATE 2.5 MG/3ML
2.5 SOLUTION RESPIRATORY (INHALATION)
Status: DISCONTINUED | OUTPATIENT
Start: 2018-06-27 | End: 2018-06-28 | Stop reason: HOSPADM

## 2018-06-27 RX ORDER — ATORVASTATIN CALCIUM 20 MG/1
20 TABLET, FILM COATED ORAL DAILY
Status: DISCONTINUED | OUTPATIENT
Start: 2018-06-27 | End: 2018-06-28 | Stop reason: HOSPADM

## 2018-06-27 RX ORDER — NITROGLYCERIN 40 MG/100ML
INJECTION INTRAVENOUS CONTINUOUS PRN
Status: DISCONTINUED | OUTPATIENT
Start: 2018-06-27 | End: 2018-06-27 | Stop reason: SURG

## 2018-06-27 RX ORDER — CLONIDINE HYDROCHLORIDE 0.1 MG/1
0.1 TABLET ORAL EVERY 8 HOURS SCHEDULED
Status: DISCONTINUED | OUTPATIENT
Start: 2018-06-27 | End: 2018-06-28 | Stop reason: HOSPADM

## 2018-06-27 RX ORDER — FAMOTIDINE 20 MG/1
20 TABLET, FILM COATED ORAL EVERY 12 HOURS SCHEDULED
Status: DISCONTINUED | OUTPATIENT
Start: 2018-06-27 | End: 2018-06-28 | Stop reason: HOSPADM

## 2018-06-27 RX ORDER — PROMETHAZINE HYDROCHLORIDE 25 MG/ML
12.5 INJECTION, SOLUTION INTRAMUSCULAR; INTRAVENOUS ONCE AS NEEDED
Status: DISCONTINUED | OUTPATIENT
Start: 2018-06-27 | End: 2018-06-27 | Stop reason: HOSPADM

## 2018-06-27 RX ORDER — PREDNISONE 1 MG/1
5 TABLET ORAL DAILY
Status: DISCONTINUED | OUTPATIENT
Start: 2018-06-27 | End: 2018-06-28 | Stop reason: HOSPADM

## 2018-06-27 RX ORDER — HYDROCODONE BITARTRATE AND ACETAMINOPHEN 5; 325 MG/1; MG/1
1 TABLET ORAL EVERY 4 HOURS PRN
Status: DISCONTINUED | OUTPATIENT
Start: 2018-06-27 | End: 2018-06-28 | Stop reason: HOSPADM

## 2018-06-27 RX ORDER — PREGABALIN 75 MG/1
150 CAPSULE ORAL EVERY 12 HOURS SCHEDULED
Status: DISCONTINUED | OUTPATIENT
Start: 2018-06-27 | End: 2018-06-28 | Stop reason: HOSPADM

## 2018-06-27 RX ORDER — FENTANYL CITRATE 50 UG/ML
INJECTION, SOLUTION INTRAMUSCULAR; INTRAVENOUS AS NEEDED
Status: DISCONTINUED | OUTPATIENT
Start: 2018-06-27 | End: 2018-06-27

## 2018-06-27 RX ORDER — PROTAMINE SULFATE 10 MG/ML
INJECTION, SOLUTION INTRAVENOUS AS NEEDED
Status: DISCONTINUED | OUTPATIENT
Start: 2018-06-27 | End: 2018-06-27 | Stop reason: SURG

## 2018-06-27 RX ORDER — EPHEDRINE SULFATE 50 MG/ML
INJECTION, SOLUTION INTRAVENOUS AS NEEDED
Status: DISCONTINUED | OUTPATIENT
Start: 2018-06-27 | End: 2018-06-27 | Stop reason: SURG

## 2018-06-27 RX ORDER — LIDOCAINE HYDROCHLORIDE 20 MG/ML
INJECTION, SOLUTION INFILTRATION; PERINEURAL AS NEEDED
Status: DISCONTINUED | OUTPATIENT
Start: 2018-06-27 | End: 2018-06-27 | Stop reason: SURG

## 2018-06-27 RX ORDER — SODIUM CHLORIDE 9 MG/ML
100 INJECTION, SOLUTION INTRAVENOUS CONTINUOUS
Status: DISCONTINUED | OUTPATIENT
Start: 2018-06-27 | End: 2018-06-27 | Stop reason: HOSPADM

## 2018-06-27 RX ORDER — MIDAZOLAM HYDROCHLORIDE 1 MG/ML
INJECTION INTRAMUSCULAR; INTRAVENOUS AS NEEDED
Status: DISCONTINUED | OUTPATIENT
Start: 2018-06-27 | End: 2018-06-27 | Stop reason: SURG

## 2018-06-27 RX ORDER — HEPARIN SODIUM 1000 [USP'U]/ML
INJECTION, SOLUTION INTRAVENOUS; SUBCUTANEOUS AS NEEDED
Status: DISCONTINUED | OUTPATIENT
Start: 2018-06-27 | End: 2018-06-27 | Stop reason: SURG

## 2018-06-27 RX ORDER — SODIUM CHLORIDE 9 MG/ML
75 INJECTION, SOLUTION INTRAVENOUS CONTINUOUS
Status: DISCONTINUED | OUTPATIENT
Start: 2018-06-27 | End: 2018-06-28 | Stop reason: HOSPADM

## 2018-06-27 RX ORDER — GLYCOPYRROLATE 0.2 MG/ML
INJECTION INTRAMUSCULAR; INTRAVENOUS AS NEEDED
Status: DISCONTINUED | OUTPATIENT
Start: 2018-06-27 | End: 2018-06-27 | Stop reason: SURG

## 2018-06-27 RX ORDER — MEPERIDINE HYDROCHLORIDE 50 MG/ML
12.5 INJECTION INTRAMUSCULAR; INTRAVENOUS; SUBCUTANEOUS
Status: DISCONTINUED | OUTPATIENT
Start: 2018-06-27 | End: 2018-06-27 | Stop reason: HOSPADM

## 2018-06-27 RX ORDER — PROMETHAZINE HYDROCHLORIDE 25 MG/1
25 TABLET ORAL ONCE AS NEEDED
Status: DISCONTINUED | OUTPATIENT
Start: 2018-06-27 | End: 2018-06-27 | Stop reason: HOSPADM

## 2018-06-27 RX ORDER — LIDOCAINE HYDROCHLORIDE 10 MG/ML
INJECTION, SOLUTION INFILTRATION; PERINEURAL AS NEEDED
Status: DISCONTINUED | OUTPATIENT
Start: 2018-06-27 | End: 2018-06-28 | Stop reason: HOSPADM

## 2018-06-27 RX ORDER — ACETAMINOPHEN 325 MG/1
650 TABLET ORAL EVERY 4 HOURS PRN
Status: DISCONTINUED | OUTPATIENT
Start: 2018-06-27 | End: 2018-06-28 | Stop reason: HOSPADM

## 2018-06-27 RX ORDER — SODIUM CHLORIDE, SODIUM GLUCONATE, SODIUM ACETATE, POTASSIUM CHLORIDE, AND MAGNESIUM CHLORIDE 526; 502; 368; 37; 30 MG/100ML; MG/100ML; MG/100ML; MG/100ML; MG/100ML
INJECTION, SOLUTION INTRAVENOUS CONTINUOUS PRN
Status: DISCONTINUED | OUTPATIENT
Start: 2018-06-27 | End: 2018-06-27 | Stop reason: SURG

## 2018-06-27 RX ORDER — SENNA AND DOCUSATE SODIUM 50; 8.6 MG/1; MG/1
2 TABLET, FILM COATED ORAL 2 TIMES DAILY PRN
Status: DISCONTINUED | OUTPATIENT
Start: 2018-06-27 | End: 2018-06-27 | Stop reason: SDUPTHER

## 2018-06-27 RX ORDER — LEVOTHYROXINE SODIUM 0.1 MG/1
200 TABLET ORAL DAILY
Status: DISCONTINUED | OUTPATIENT
Start: 2018-06-27 | End: 2018-06-28 | Stop reason: HOSPADM

## 2018-06-27 RX ORDER — ONDANSETRON 4 MG/1
4 TABLET, ORALLY DISINTEGRATING ORAL EVERY 6 HOURS PRN
Status: DISCONTINUED | OUTPATIENT
Start: 2018-06-27 | End: 2018-06-28 | Stop reason: HOSPADM

## 2018-06-27 RX ORDER — NEOSTIGMINE METHYLSULFATE 4 MG/4 ML
SYRINGE (ML) INTRAVENOUS AS NEEDED
Status: DISCONTINUED | OUTPATIENT
Start: 2018-06-27 | End: 2018-06-27 | Stop reason: SURG

## 2018-06-27 RX ORDER — DIAZEPAM 5 MG/1
5 TABLET ORAL 2 TIMES DAILY PRN
Status: DISCONTINUED | OUTPATIENT
Start: 2018-06-27 | End: 2018-06-28 | Stop reason: HOSPADM

## 2018-06-27 RX ORDER — ONDANSETRON 2 MG/ML
INJECTION INTRAMUSCULAR; INTRAVENOUS AS NEEDED
Status: DISCONTINUED | OUTPATIENT
Start: 2018-06-27 | End: 2018-06-27 | Stop reason: SURG

## 2018-06-27 RX ORDER — BUPIVACAINE HCL/0.9 % NACL/PF 0.1 %
2 PLASTIC BAG, INJECTION (ML) EPIDURAL ONCE
Status: COMPLETED | OUTPATIENT
Start: 2018-06-27 | End: 2018-06-27

## 2018-06-27 RX ORDER — PROMETHAZINE HYDROCHLORIDE 25 MG/1
25 SUPPOSITORY RECTAL ONCE AS NEEDED
Status: DISCONTINUED | OUTPATIENT
Start: 2018-06-27 | End: 2018-06-27 | Stop reason: HOSPADM

## 2018-06-27 RX ORDER — PROPOFOL 10 MG/ML
VIAL (ML) INTRAVENOUS AS NEEDED
Status: DISCONTINUED | OUTPATIENT
Start: 2018-06-27 | End: 2018-06-27 | Stop reason: SURG

## 2018-06-27 RX ORDER — HEPARIN SODIUM 5000 [USP'U]/ML
INJECTION, SOLUTION INTRAVENOUS; SUBCUTANEOUS AS NEEDED
Status: DISCONTINUED | OUTPATIENT
Start: 2018-06-27 | End: 2018-06-28 | Stop reason: HOSPADM

## 2018-06-27 RX ADMIN — ASPIRIN 81 MG: 81 TABLET, COATED ORAL at 13:21

## 2018-06-27 RX ADMIN — GLYCOPYRROLATE 0.2 MG: 0.2 INJECTION, SOLUTION INTRAMUSCULAR; INTRAVENOUS at 07:35

## 2018-06-27 RX ADMIN — ONDANSETRON 4 MG: 2 INJECTION INTRAMUSCULAR; INTRAVENOUS at 09:17

## 2018-06-27 RX ADMIN — SODIUM CHLORIDE 100 ML/HR: 9 INJECTION, SOLUTION INTRAVENOUS at 06:14

## 2018-06-27 RX ADMIN — GLYCOPYRROLATE 0.4 MG: 0.2 INJECTION, SOLUTION INTRAMUSCULAR; INTRAVENOUS at 09:29

## 2018-06-27 RX ADMIN — PROPOFOL 120 MG: 10 INJECTION, EMULSION INTRAVENOUS at 07:24

## 2018-06-27 RX ADMIN — HYDROXYCHLOROQUINE SULFATE 200 MG: 200 TABLET, FILM COATED ORAL at 20:17

## 2018-06-27 RX ADMIN — FAMOTIDINE 20 MG: 20 TABLET ORAL at 20:17

## 2018-06-27 RX ADMIN — CLONIDINE HYDROCHLORIDE 0.1 MG: 0.1 TABLET ORAL at 21:09

## 2018-06-27 RX ADMIN — HYDROCODONE BITARTRATE AND ACETAMINOPHEN 1 TABLET: 5; 325 TABLET ORAL at 21:09

## 2018-06-27 RX ADMIN — FENTANYL CITRATE 50 MCG: 50 INJECTION, SOLUTION INTRAMUSCULAR; INTRAVENOUS at 09:22

## 2018-06-27 RX ADMIN — MIDAZOLAM 2 MG: 1 INJECTION INTRAMUSCULAR; INTRAVENOUS at 07:11

## 2018-06-27 RX ADMIN — HYDROXYCHLOROQUINE SULFATE 200 MG: 200 TABLET, FILM COATED ORAL at 13:23

## 2018-06-27 RX ADMIN — FENTANYL CITRATE 50 MCG: 50 INJECTION, SOLUTION INTRAMUSCULAR; INTRAVENOUS at 07:50

## 2018-06-27 RX ADMIN — CEFAZOLIN 2 G: 10 INJECTION, POWDER, FOR SOLUTION INTRAVENOUS at 22:48

## 2018-06-27 RX ADMIN — CEFAZOLIN 2 G: 10 INJECTION, POWDER, FOR SOLUTION INTRAVENOUS at 15:08

## 2018-06-27 RX ADMIN — NITROGLYCERIN 20 MCG/MIN: 40 INJECTION INTRAVENOUS at 09:36

## 2018-06-27 RX ADMIN — CLONIDINE HYDROCHLORIDE 0.1 MG: 0.1 TABLET ORAL at 13:22

## 2018-06-27 RX ADMIN — SODIUM CHLORIDE, SODIUM GLUCONATE, SODIUM ACETATE, POTASSIUM CHLORIDE, AND MAGNESIUM CHLORIDE: 526; 502; 368; 37; 30 INJECTION, SOLUTION INTRAVENOUS at 07:37

## 2018-06-27 RX ADMIN — VALSARTAN 320 MG: 160 TABLET, FILM COATED ORAL at 13:22

## 2018-06-27 RX ADMIN — PREGABALIN 150 MG: 75 CAPSULE ORAL at 20:17

## 2018-06-27 RX ADMIN — Medication 2 G: at 07:20

## 2018-06-27 RX ADMIN — EPHEDRINE SULFATE 10 MG: 50 INJECTION INTRAVENOUS at 07:46

## 2018-06-27 RX ADMIN — HEPARIN SODIUM 8000 UNITS: 1000 INJECTION, SOLUTION INTRAVENOUS; SUBCUTANEOUS at 08:06

## 2018-06-27 RX ADMIN — LIDOCAINE HYDROCHLORIDE 60 MG: 20 INJECTION, SOLUTION INFILTRATION; PERINEURAL at 07:24

## 2018-06-27 RX ADMIN — METHOCARBAMOL 750 MG: 750 TABLET ORAL at 15:08

## 2018-06-27 RX ADMIN — METHOCARBAMOL 750 MG: 750 TABLET ORAL at 20:17

## 2018-06-27 RX ADMIN — KETOROLAC TROMETHAMINE 30 MG: 30 INJECTION, SOLUTION INTRAMUSCULAR; INTRAVENOUS at 10:40

## 2018-06-27 RX ADMIN — HYDROCORTISONE SODIUM SUCCINATE 100 MG: 100 INJECTION, POWDER, FOR SOLUTION INTRAMUSCULAR; INTRAVENOUS at 07:30

## 2018-06-27 RX ADMIN — ATORVASTATIN CALCIUM 20 MG: 20 TABLET, FILM COATED ORAL at 13:22

## 2018-06-27 RX ADMIN — FENTANYL CITRATE 100 MCG: 50 INJECTION, SOLUTION INTRAMUSCULAR; INTRAVENOUS at 07:21

## 2018-06-27 RX ADMIN — PREDNISONE 5 MG: 5 TABLET ORAL at 13:22

## 2018-06-27 RX ADMIN — FAMOTIDINE 20 MG: 20 TABLET ORAL at 13:22

## 2018-06-27 RX ADMIN — PROTAMINE SULFATE 20 MG: 10 INJECTION, SOLUTION INTRAVENOUS at 09:04

## 2018-06-27 RX ADMIN — SODIUM CHLORIDE 75 ML/HR: 9 INJECTION, SOLUTION INTRAVENOUS at 13:28

## 2018-06-27 RX ADMIN — VECURONIUM BROMIDE 10 MG: 1 INJECTION, POWDER, LYOPHILIZED, FOR SOLUTION INTRAVENOUS at 07:24

## 2018-06-27 RX ADMIN — Medication 3 MG: at 09:29

## 2018-06-27 RX ADMIN — HYDROCODONE BITARTRATE AND ACETAMINOPHEN 1 TABLET: 5; 325 TABLET ORAL at 16:50

## 2018-06-27 RX ADMIN — PREGABALIN 150 MG: 75 CAPSULE ORAL at 13:22

## 2018-06-27 RX ADMIN — LEVOTHYROXINE SODIUM 200 MCG: 100 TABLET ORAL at 13:22

## 2018-06-27 RX ADMIN — EPHEDRINE SULFATE 10 MG: 50 INJECTION INTRAVENOUS at 07:34

## 2018-06-27 RX ADMIN — EPHEDRINE SULFATE 5 MG: 50 INJECTION INTRAVENOUS at 08:13

## 2018-06-27 RX ADMIN — MELOXICAM 15 MG: 15 TABLET ORAL at 13:23

## 2018-06-27 RX ADMIN — FENTANYL CITRATE 50 MCG: 50 INJECTION, SOLUTION INTRAMUSCULAR; INTRAVENOUS at 07:55

## 2018-06-27 NOTE — ANESTHESIA PROCEDURE NOTES
Peripheral IV    Start time: 6/27/2018 7:37 AM  End time: 6/27/2018 7:37 AM  Line placed for Fluids/Medication Admin.  Performed By   CRNA: RYLAN SARAVIA  Preanesthetic Checklist  Completed: patient identified, site marked, surgical consent, pre-op evaluation, timeout performed, IV checked, risks and benefits discussed and monitors and equipment checked  Peripheral IV Prep   Patient position: supine   Prep: ChloraPrep  Patient monitoring: heart rate, cardiac monitor and continuous pulse ox  Peripheral IV Procedure   Laterality:right  Location:  Hand  Catheter size: 18 G         Post Assessment   Dressing Type: tape and transparent.    IV Dressing/Site: clean, dry and intact

## 2018-06-27 NOTE — ANESTHESIA PROCEDURE NOTES
Airway  Urgency: elective    Date/Time: 6/27/2018 7:26 AM  End Time:6/27/2018 7:26 AM  Airway not difficult    General Information and Staff    Patient location during procedure: OR  CRNA: RYLAN SARAVIA    Indications and Patient Condition  Indications for airway management: airway protection    Preoxygenated: yes  Mask difficulty assessment: 1 - vent by mask    Final Airway Details  Final airway type: endotracheal airway      Successful airway: ETT  Cuffed: yes   Successful intubation technique: direct laryngoscopy  Facilitating devices/methods: intubating stylet  Endotracheal tube insertion site: oral  Blade: Padmini  Blade size: #3  ETT size: 7.5 mm  Cormack-Lehane Classification: grade I - full view of glottis  Placement verified by: chest auscultation and capnometry   Cuff volume (mL): 10  Measured from: lips  ETT to lips (cm): 21  Number of attempts at approach: 1

## 2018-06-27 NOTE — ANESTHESIA PREPROCEDURE EVALUATION
" Anesthesia Evaluation     Patient summary reviewed   NPO Solid Status: > 8 hours  NPO Liquid Status: > 8 hours           Airway   Mallampati: II  TM distance: >3 FB  Neck ROM: full  No difficulty expected  Dental    (+) upper dentures    Pulmonary     breath sounds clear to auscultation  (+) a smoker Former, COPD mild, sleep apnea,   Cardiovascular   Exercise tolerance: good (4-7 METS)    NYHA Classification: II  ECG reviewed  PT is on anticoagulation therapy  Rhythm: regular  Rate: normal    (+) hypertension poorly controlled, CAD, PVD, hyperlipidemia,  carotid artery disease right carotid      Neuro/Psych  (+) psychiatric history Depression,     GI/Hepatic/Renal/Endo    (+)   diabetes mellitus type 2, hypothyroidism,     Musculoskeletal     Abdominal     Abdomen: soft.   Substance History      OB/GYN          Other   (+) arthritis                       Anesthesia Plan    ASA 3     general   (GA and the arterial line explained to the patient who has taken prednisone for \"joint pain\" during the last four months.)  intravenous induction   Anesthetic plan and risks discussed with patient.      "

## 2018-06-27 NOTE — ANESTHESIA PROCEDURE NOTES
Arterial Line    Patient location during procedure: OR  Start time: 6/27/2018 7:19 AM  Stop Time:6/27/2018 7:23 AM       Line placed for hemodynamic monitoring and ABGs/Labs/ISTAT.  Performed By   CRNA: RYLAN SARAVIA  Preanesthetic Checklist  Completed: patient identified, site marked, surgical consent, pre-op evaluation, timeout performed, IV checked, risks and benefits discussed and monitors and equipment checked  Arterial Line Prep   Sterile Tech: cap, gloves and mask  Prep: ChloraPrep  Patient monitoring: blood pressure monitoring, continuous pulse oximetry and EKG  Arterial Line Procedure   Laterality:left  Location:  radial artery  Catheter size: 20 G   Guidance: landmark technique and palpation technique  Number of attempts: 2  Successful placement: yes          Post Assessment   Dressing Type: occlusive dressing applied, secured with tape and wrist guard applied.   Complications no  Circ/Move/Sens Assessment: normal and unchanged.   Patient Tolerance: patient tolerated the procedure well with no apparent complications

## 2018-06-27 NOTE — ANESTHESIA POSTPROCEDURE EVALUATION
Patient: Jewel Billy    Procedure Summary     Date:  06/27/18 Room / Location:  [facility] OR  / [facility] OR    Anesthesia Start:  0715 Anesthesia Stop:  0951    Procedure:  CAROTID ENDARTERECTOMY                          (C-ARM) (Left Neck) Diagnosis:       Bilateral carotid artery stenosis      (Bilateral carotid artery stenosis [I65.23])    Surgeon:  Bayron Griffin MD Provider:  Wiley Shelton MD    Anesthesia Type:  general ASA Status:  3          Anesthesia Type: general  Last vitals  BP   113/63 (06/27/18 0602)   Temp   97.1 °F (36.2 °C) (06/27/18 0602)   Pulse   56 (06/27/18 0602)   Resp   18 (06/27/18 0602)     SpO2   100 % (06/27/18 0602)     Post Anesthesia Care and Evaluation    Patient location during evaluation: PACU  Patient participation: complete - patient participated  Level of consciousness: awake and alert  Pain score: 0  Pain management: adequate  Airway patency: patent  Anesthetic complications: No anesthetic complications  PONV Status: none  Cardiovascular status: acceptable  Respiratory status: acceptable  Hydration status: acceptable

## 2018-06-28 VITALS
RESPIRATION RATE: 14 BRPM | TEMPERATURE: 97.9 F | OXYGEN SATURATION: 97 % | BODY MASS INDEX: 27.59 KG/M2 | DIASTOLIC BLOOD PRESSURE: 81 MMHG | HEART RATE: 58 BPM | SYSTOLIC BLOOD PRESSURE: 133 MMHG | HEIGHT: 72 IN | WEIGHT: 203.71 LBS

## 2018-06-28 PROBLEM — I65.29 CAROTID STENOSIS: Status: RESOLVED | Noted: 2018-05-23 | Resolved: 2018-06-28

## 2018-06-28 LAB
LAB AP CASE REPORT: NORMAL
PATH REPORT.FINAL DX SPEC: NORMAL
PATH REPORT.GROSS SPEC: NORMAL

## 2018-06-28 PROCEDURE — 63710000001 PREDNISONE PER 5 MG: Performed by: THORACIC SURGERY (CARDIOTHORACIC VASCULAR SURGERY)

## 2018-06-28 PROCEDURE — 94760 N-INVAS EAR/PLS OXIMETRY 1: CPT

## 2018-06-28 PROCEDURE — 94640 AIRWAY INHALATION TREATMENT: CPT

## 2018-06-28 PROCEDURE — 99024 POSTOP FOLLOW-UP VISIT: CPT | Performed by: NURSE PRACTITIONER

## 2018-06-28 RX ADMIN — ATORVASTATIN CALCIUM 20 MG: 20 TABLET, FILM COATED ORAL at 09:12

## 2018-06-28 RX ADMIN — LEVOTHYROXINE SODIUM 200 MCG: 100 TABLET ORAL at 09:14

## 2018-06-28 RX ADMIN — FAMOTIDINE 20 MG: 20 TABLET ORAL at 09:14

## 2018-06-28 RX ADMIN — CLONIDINE HYDROCHLORIDE 0.1 MG: 0.1 TABLET ORAL at 05:41

## 2018-06-28 RX ADMIN — HYDROXYCHLOROQUINE SULFATE 200 MG: 200 TABLET, FILM COATED ORAL at 09:15

## 2018-06-28 RX ADMIN — ALBUTEROL SULFATE 2.5 MG: 2.5 SOLUTION RESPIRATORY (INHALATION) at 08:35

## 2018-06-28 RX ADMIN — ASPIRIN 81 MG: 81 TABLET, COATED ORAL at 09:12

## 2018-06-28 RX ADMIN — MELOXICAM 15 MG: 15 TABLET ORAL at 09:13

## 2018-06-28 RX ADMIN — METHOCARBAMOL 750 MG: 750 TABLET ORAL at 09:14

## 2018-06-28 RX ADMIN — PREGABALIN 150 MG: 75 CAPSULE ORAL at 09:12

## 2018-06-28 RX ADMIN — VALSARTAN 320 MG: 160 TABLET, FILM COATED ORAL at 09:14

## 2018-06-28 RX ADMIN — SODIUM CHLORIDE 75 ML/HR: 9 INJECTION, SOLUTION INTRAVENOUS at 02:10

## 2018-06-28 RX ADMIN — HYDROCODONE BITARTRATE AND ACETAMINOPHEN 1 TABLET: 5; 325 TABLET ORAL at 06:18

## 2018-06-28 RX ADMIN — PREDNISONE 5 MG: 5 TABLET ORAL at 09:12

## 2018-07-03 ENCOUNTER — OFFICE VISIT (OUTPATIENT)
Dept: CARDIAC SURGERY | Facility: CLINIC | Age: 63
End: 2018-07-03

## 2018-07-03 VITALS
OXYGEN SATURATION: 99 % | SYSTOLIC BLOOD PRESSURE: 150 MMHG | HEART RATE: 76 BPM | WEIGHT: 200.8 LBS | TEMPERATURE: 97.5 F | BODY MASS INDEX: 27.2 KG/M2 | HEIGHT: 72 IN | DIASTOLIC BLOOD PRESSURE: 90 MMHG

## 2018-07-03 DIAGNOSIS — I65.22 LEFT CAROTID STENOSIS: ICD-10-CM

## 2018-07-03 DIAGNOSIS — Z48.812 SURGICAL AFTERCARE, CIRCULATORY SYSTEM: Primary | ICD-10-CM

## 2018-07-03 PROCEDURE — 99024 POSTOP FOLLOW-UP VISIT: CPT | Performed by: NURSE PRACTITIONER

## 2018-07-03 RX ORDER — HYDROXYCHLOROQUINE SULFATE 200 MG/1
200 TABLET, FILM COATED ORAL EVERY 12 HOURS
COMMUNITY
End: 2021-06-24

## 2018-07-03 RX ORDER — PREGABALIN 100 MG/1
150 CAPSULE ORAL 2 TIMES DAILY
COMMUNITY
End: 2020-04-23

## 2018-07-03 RX ORDER — FAMCICLOVIR 500 MG/1
500 TABLET ORAL
COMMUNITY

## 2018-07-03 RX ORDER — MELATONIN
2000 DAILY
COMMUNITY

## 2018-07-03 RX ORDER — MELOXICAM 15 MG/1
15 TABLET ORAL DAILY
COMMUNITY
End: 2021-06-24

## 2018-07-03 RX ORDER — CLONIDINE HYDROCHLORIDE 0.1 MG/1
0.1 TABLET ORAL EVERY 8 HOURS SCHEDULED
COMMUNITY

## 2018-07-03 RX ORDER — SENNA AND DOCUSATE SODIUM 50; 8.6 MG/1; MG/1
2 TABLET, FILM COATED ORAL EVERY 12 HOURS PRN
COMMUNITY
End: 2018-08-23 | Stop reason: HOSPADM

## 2018-07-03 RX ORDER — METHOCARBAMOL 750 MG/1
750 TABLET, FILM COATED ORAL 2 TIMES DAILY PRN
COMMUNITY

## 2018-07-03 RX ORDER — HYDROCODONE BITARTRATE AND ACETAMINOPHEN 10; 325 MG/1; MG/1
1 TABLET ORAL EVERY 8 HOURS PRN
COMMUNITY
End: 2020-04-23

## 2018-07-03 RX ORDER — CANDESARTAN 32 MG/1
32 TABLET ORAL DAILY
COMMUNITY

## 2018-07-03 RX ORDER — FAMOTIDINE 20 MG/1
20 TABLET, FILM COATED ORAL 2 TIMES DAILY
COMMUNITY
End: 2018-08-23 | Stop reason: ALTCHOICE

## 2018-07-03 RX ORDER — DIAZEPAM 5 MG/1
5 TABLET ORAL 2 TIMES DAILY PRN
COMMUNITY
End: 2021-06-24

## 2018-07-03 RX ORDER — ASPIRIN 81 MG/1
81 TABLET ORAL DAILY
COMMUNITY

## 2018-07-03 RX ORDER — PRAVASTATIN SODIUM 40 MG
40 TABLET ORAL DAILY
COMMUNITY

## 2018-07-03 RX ORDER — PREDNISONE 1 MG/1
5 TABLET ORAL DAILY
COMMUNITY
End: 2020-04-23

## 2018-07-03 RX ORDER — LEVOTHYROXINE SODIUM 0.2 MG/1
200 TABLET ORAL DAILY
COMMUNITY

## 2018-07-03 NOTE — PROGRESS NOTES
Subjective   Patient ID: Jewel Billy is a 63 y.o. male is here today for surgical  follow-up SP L CEA with known bilateral carotid stenosis.  Chief Complaint   Patient presents with   • Post-op     Carotid   L neck pain and swelling   RUE rash and marks from BP cuff.    Denies any neurosensory symptoms.     History of Present Illness  PCP:  Kelsey MCCLELLAN, Kaylee MCCLELLAN     62 y.o. male with HTN(stable, increased risk stroke, rupture), Hyperlipidemia(stable, increased risk cardiovascular events), Diabetes Mellitus(stable, increased risk cardiovascular events) and Smoker(uncontrolled, increased risk cardiovascular events), Carotid Stenosis(new, increased risk stroke).  smokes 1/2 PPD.  Rushing noises in head x 1 year.  Carotid bruits noted on exam.   Carotid stenosis noted on duplex imaging.  No TIA stroke amaurosis.  No MI claudication. No other associated signs, symptoms or modifying factors.  Feels is recovering well from RCEA and desires to proceed with L CEA.       4/4/2018 Carotid Duplex:  AWILDA >70% (321cm/s, ratio 4.9) antegrade vert.  LICA 50-69% (221cm/s, ratio 1.9)   4/24/2018 CTA Carotids:  AWILDA 90%, LICA 80%  5/23/18  RIGHT CEA  5/24/18  Hospital discharge  6/27/18  LEFT CEA  6/28/18  Hospital discharge      The following portions of the patient's history were reviewed and updated as appropriate: allergies, current medications, past family history, past medical history, past social history, past surgical history and problem list.  Allergies   Allergen Reactions   • Vancomycin Unknown (See Comments)     Pt states had accident in 1980's and mother told him he had a reaction to this medication during that time       Current Outpatient Prescriptions:   •  aspirin 81 MG EC tablet, Take 81 mg by mouth Daily., Disp: , Rfl:   •  candesartan (ATACAND) 32 MG tablet, Take 32 mg by mouth Daily., Disp: , Rfl:   •  cholecalciferol (VITAMIN D3) 1000 units tablet, Take 2,000 Units by mouth Daily., Disp: ,  Rfl:   •  CloNIDine (CATAPRES) 0.1 MG tablet, Take 0.1 mg by mouth Every 8 (Eight) Hours., Disp: , Rfl:   •  diazePAM (VALIUM) 5 MG tablet, Take 5 mg by mouth 2 (Two) Times a Day As Needed for Anxiety., Disp: , Rfl:   •  famciclovir (FAMVIR) 500 MG tablet, Take 500 mg by mouth 3 (Three) Times a Week if Needed., Disp: , Rfl:   •  famotidine (PEPCID) 20 MG tablet, Take 20 mg by mouth 2 (Two) Times a Day., Disp: , Rfl:   •  HYDROcodone-acetaminophen (NORCO)  MG per tablet, Take 1 tablet by mouth Every 8 (Eight) Hours As Needed for Moderate Pain ., Disp: , Rfl:   •  hydroxychloroquine (PLAQUENIL) 200 MG tablet, Take 200 mg by mouth Every 12 (Twelve) Hours., Disp: , Rfl:   •  levothyroxine (SYNTHROID, LEVOTHROID) 200 MCG tablet, Take 200 mcg by mouth Daily., Disp: , Rfl:   •  meloxicam (MOBIC) 15 MG tablet, Take 15 mg by mouth Daily., Disp: , Rfl:   •  metFORMIN (GLUCOPHAGE) 500 MG tablet, Take 500 mg by mouth 2 (Two) Times a Day With Meals., Disp: , Rfl:   •  methocarbamol (ROBAXIN) 750 MG tablet, Take 750 mg by mouth 3 (Three) Times a Day., Disp: , Rfl:   •  pravastatin (PRAVACHOL) 40 MG tablet, Take 40 mg by mouth Daily., Disp: , Rfl:   •  predniSONE (DELTASONE) 5 MG tablet, Take 5 mg by mouth Daily., Disp: , Rfl:   •  pregabalin (LYRICA) 100 MG capsule, Take 150 mg by mouth 2 (Two) Times a Day., Disp: , Rfl:   •  sennosides-docusate sodium (SENOKOT-S) 8.6-50 MG tablet, Take 2 tablets by mouth Every 12 (Twelve) Hours As Needed for Constipation., Disp: , Rfl:   Current outpatient and discharge medications have been reconciled for the patient.  Reviewed by: VY Miller    Review of Systems   Constitution: Negative for chills, decreased appetite, fever and malaise/fatigue.   HENT: Negative for hearing loss, hoarse voice, nosebleeds, sore throat and stridor.    Eyes: Negative for visual disturbance.   Cardiovascular: Negative for chest pain, cyanosis, dyspnea on exertion, leg swelling, near-syncope and  syncope.   Respiratory: Positive for cough. Negative for hemoptysis, shortness of breath and wheezing.         Continues to smoke   Hematologic/Lymphatic: Negative for bleeding problem. Does not bruise/bleed easily.   Skin: Positive for flushing and rash. Negative for color change and poor wound healing.        INC cl dry   Redness and stiffness around wound   RUE arm at BP site with rash erythema and blood blisters.    Musculoskeletal: Negative for falls, muscle cramps and muscle weakness.   Gastrointestinal: Negative for change in bowel habit, nausea and vomiting.   Genitourinary: Negative for hematuria.   Neurological: Negative for brief paralysis, focal weakness, headaches, light-headedness, numbness, paresthesias and sensory change.             Denies any amaurosis fugae, paresthesias, paralysis, change in speech, or neurosensory symptoms.  Denies any rhythmic pulsation in ears   Funny feeling at incisional site      Psychiatric/Behavioral: Negative for altered mental status. The patient is not nervous/anxious.    Allergic/Immunologic: Negative for hives.        Objective   Physical Exam   Constitutional: He is oriented to person, place, and time. He appears well-nourished. No distress.   Body mass index is 28.13 kg/m².     HENT:   Head: Normocephalic.   Mouth/Throat: Oropharynx is clear and moist.   Tongue midline Facial movements symmetrical Sensation intact including proximal to incisional line, feels odd lateral to incision      Eyes: Conjunctivae and EOM are normal. Pupils are equal, round, and reactive to light.   Neck: Neck supple. No JVD present.   ROM limited by discomfort and edema    Cardiovascular: Normal rate, regular rhythm, normal heart sounds and intact distal pulses.    Pulses:       Carotid pulses are 1+ on the right side, and 1+ on the left side with bruit.       Radial pulses are 2+ on the right side, and 2+ on the left side.        Posterior tibial pulses are 2+ on the right side, and 2+ on  the left side.   Pulmonary/Chest: Effort normal. No stridor. He has wheezes. He has no rales.   Abdominal: Soft. Bowel sounds are normal.   Musculoskeletal: He exhibits edema (Inc site and RUE ) and tenderness (Inc site and RUE ).   Symmetrical = movements      Neurological: He is alert and oriented to person, place, and time. No cranial nerve deficit.   Skin: Skin is warm and dry. Capillary refill takes less than 2 seconds. Rash noted. There is erythema. No pallor.   L cea incision healing well Prineo strip removed Harvinder well  Remains well approximated.  Erythema in square around incision with Edema lateral to incision  Possible reaction to Prineo strip or prep  RUE at automatic BP cuff with erythema 2 lg blister site rash tender    Psychiatric: His behavior is normal. Judgment normal.   Nursing note and vitals reviewed.      Vitals:    07/03/18 1133   BP: 150/90   Pulse: 76   Temp: 97.5 °F (36.4 °C)   SpO2: 99%         Assessment/Plan   Independent Review of Radiographic Studies:    None with today's visit     1. Surgical aftercare, circulatory system  Recovering well  Increase activities  May drive.  No Limitations after 7/5/18  Cleanse incisional site with baby shampoo  RUE clean with baby shampoo and apply silvadene daily and prn for 72 hrs    2. Bilateral carotid artery stenosis  SP surgical revascularization.  No neuro deficit  Carotid US in 6 wks   Continue medical management with antiplateletn, ARB, VIT D, and statin  Control BS  Continue smoking reduction.         Recheck 1 wk unless If you should experience any neurological symptoms including but not limited to visual or speech disturbances confusion, seizures, or weakness of limbs of one side of your body notify [facility] center immediately for evaluation or if after hours present to the nearest Emergency Department.

## 2018-07-03 NOTE — PATIENT INSTRUCTIONS
Baby shampoo only     Benadryl as needed for itching and inflammation  Return next week for wound check  If you should experience any neurological symptoms including but not limited to visual or speech disturbances confusion, seizures, or weakness of limbs of one side of your body notify [facility] center immediately for evaluation or if after hours present to the nearest Emergency Department.   Silvadene cream daily to BP cuff once a day and as needed  for 3 days     Follow Up

## 2018-07-10 ENCOUNTER — OFFICE VISIT (OUTPATIENT)
Dept: CARDIAC SURGERY | Facility: CLINIC | Age: 63
End: 2018-07-10

## 2018-07-10 VITALS
BODY MASS INDEX: 26.9 KG/M2 | HEIGHT: 72 IN | TEMPERATURE: 98 F | DIASTOLIC BLOOD PRESSURE: 92 MMHG | SYSTOLIC BLOOD PRESSURE: 162 MMHG | WEIGHT: 198.6 LBS | HEART RATE: 79 BPM | OXYGEN SATURATION: 99 %

## 2018-07-10 DIAGNOSIS — I65.22 LEFT CAROTID STENOSIS: ICD-10-CM

## 2018-07-10 DIAGNOSIS — Z48.812 SURGICAL AFTERCARE, CIRCULATORY SYSTEM: Primary | ICD-10-CM

## 2018-07-10 PROCEDURE — 99024 POSTOP FOLLOW-UP VISIT: CPT | Performed by: NURSE PRACTITIONER

## 2018-07-10 NOTE — PATIENT INSTRUCTIONS
Wound and R arm look much better.   Continue baby shampoo and steroid cream    FU as scheduled   BP recheck 160/84

## 2018-07-12 NOTE — PROGRESS NOTES
Subjective   Patient ID: Jewel Billy is a 63 y.o. male is here today for surgical  follow-up SP L CEA with known bilateral carotid stenosis.  Chief Complaint   Patient presents with   • Carotid Artery Disease     6/27/18 LCEA   L neck pain and swelling   RUE rash and marks from BP cuff.  Wounds and area improved.  Denies any neurosensory symptoms.     Carotid Artery Disease   Associated symptoms include neck pain (sore neck ). Pertinent negatives include no change in bowel habit, chest pain, chills, coughing, fever, headaches, nausea, numbness, rash, sore throat or vomiting.     PCP:  Kelsey MCCLELLAN, Kaylee MCCLELLAN     62 y.o. male with HTN(stable, increased risk stroke, rupture), Hyperlipidemia(stable, increased risk cardiovascular events), Diabetes Mellitus(stable, increased risk cardiovascular events) and Smoker(uncontrolled, increased risk cardiovascular events), Carotid Stenosis(new, increased risk stroke).  smokes 1/2 PPD.  Rushing noises in head x 1 year.  Carotid bruits noted on exam.  Carotid stenosis noted on duplex imaging.  No TIA stroke amaurosis.  No MI claudication. No other associated signs, symptoms or modifying factors.  Feels is recovering from L CEA.       4/4/2018 Carotid Duplex:  AWILDA >70% (321cm/s, ratio 4.9) antegrade vert.  LICA 50-69% (221cm/s, ratio 1.9)   4/24/2018 CTA Carotids:  AWILDA 90%, LICA 80%  5/23/18  RIGHT CEA  5/24/18  Hospital discharge  6/27/18  LEFT CEA  6/28/18  Hospital discharge      The following portions of the patient's history were reviewed and updated as appropriate: allergies, current medications, past family history, past medical history, past social history, past surgical history and problem list.  Allergies   Allergen Reactions   • Vancomycin Unknown (See Comments)     Pt states had accident in 1980's and mother told him he had a reaction to this medication during that time       Current Outpatient Prescriptions:   •  aspirin 81 MG EC tablet, Take 81  mg by mouth Daily., Disp: , Rfl:   •  candesartan (ATACAND) 32 MG tablet, Take 32 mg by mouth Daily., Disp: , Rfl:   •  cholecalciferol (VITAMIN D3) 1000 units tablet, Take 2,000 Units by mouth Daily., Disp: , Rfl:   •  CloNIDine (CATAPRES) 0.1 MG tablet, Take 0.1 mg by mouth Every 8 (Eight) Hours., Disp: , Rfl:   •  diazePAM (VALIUM) 5 MG tablet, Take 5 mg by mouth 2 (Two) Times a Day As Needed for Anxiety., Disp: , Rfl:   •  famciclovir (FAMVIR) 500 MG tablet, Take 500 mg by mouth 3 (Three) Times a Week if Needed., Disp: , Rfl:   •  famotidine (PEPCID) 20 MG tablet, Take 20 mg by mouth 2 (Two) Times a Day., Disp: , Rfl:   •  HYDROcodone-acetaminophen (NORCO)  MG per tablet, Take 1 tablet by mouth Every 8 (Eight) Hours As Needed for Moderate Pain ., Disp: , Rfl:   •  hydroxychloroquine (PLAQUENIL) 200 MG tablet, Take 200 mg by mouth Every 12 (Twelve) Hours., Disp: , Rfl:   •  levothyroxine (SYNTHROID, LEVOTHROID) 200 MCG tablet, Take 200 mcg by mouth Daily., Disp: , Rfl:   •  meloxicam (MOBIC) 15 MG tablet, Take 15 mg by mouth Daily., Disp: , Rfl:   •  metFORMIN (GLUCOPHAGE) 500 MG tablet, Take 500 mg by mouth 2 (Two) Times a Day With Meals., Disp: , Rfl:   •  methocarbamol (ROBAXIN) 750 MG tablet, Take 750 mg by mouth 3 (Three) Times a Day., Disp: , Rfl:   •  pravastatin (PRAVACHOL) 40 MG tablet, Take 40 mg by mouth Daily., Disp: , Rfl:   •  predniSONE (DELTASONE) 5 MG tablet, Take 5 mg by mouth Daily., Disp: , Rfl:   •  pregabalin (LYRICA) 100 MG capsule, Take 150 mg by mouth 2 (Two) Times a Day., Disp: , Rfl:   •  sennosides-docusate sodium (SENOKOT-S) 8.6-50 MG tablet, Take 2 tablets by mouth Every 12 (Twelve) Hours As Needed for Constipation., Disp: , Rfl:   Current outpatient and discharge medications have been reconciled for the patient.  Reviewed by: VY Miller    Review of Systems   Constitution: Negative for chills, decreased appetite, fever and malaise/fatigue.   HENT: Negative for  hearing loss, hoarse voice, nosebleeds, sore throat and stridor.    Eyes: Negative for visual disturbance.   Cardiovascular: Negative for chest pain, cyanosis, dyspnea on exertion, leg swelling, near-syncope and syncope.   Respiratory: Negative for cough, hemoptysis, shortness of breath and wheezing.         Continues to smoke   Hematologic/Lymphatic: Negative for bleeding problem. Does not bruise/bleed easily.   Skin: Negative for color change, flushing, poor wound healing and rash.        INC cl dry   Redness and stiffness around wound   RUE arm at BP site much improved   Musculoskeletal: Positive for neck pain (sore neck ). Negative for falls, muscle cramps and muscle weakness.   Gastrointestinal: Negative for change in bowel habit, nausea and vomiting.   Genitourinary: Negative for hematuria.   Neurological: Negative for brief paralysis, focal weakness, headaches, light-headedness, numbness, paresthesias and sensory change.             Denies any amaurosis fugae, paresthesias, paralysis, change in speech, or neurosensory symptoms.  Denies any rhythmic pulsation in ears   Funny feeling at incisional site      Psychiatric/Behavioral: Negative for altered mental status. The patient is not nervous/anxious.    Allergic/Immunologic: Negative for hives.        Objective   Physical Exam   Constitutional: He is oriented to person, place, and time. He appears well-nourished. No distress.   Body mass index is 28.13 kg/m².     HENT:   Head: Normocephalic.   Mouth/Throat: Oropharynx is clear and moist.   Tongue midline Facial movements symmetrical Sensation intact including proximal to incisional line, feels odd lateral to incision      Eyes: Conjunctivae and EOM are normal. Pupils are equal, round, and reactive to light.   Neck: Neck supple. No JVD present.   ROM limited by discomfort and edema Improved    Cardiovascular: Normal rate, regular rhythm, normal heart sounds and intact distal pulses.    Pulses:       Carotid  pulses are 1+ on the right side.       Radial pulses are 2+ on the right side, and 2+ on the left side.        Posterior tibial pulses are 2+ on the right side, and 2+ on the left side.   Pulmonary/Chest: Effort normal and breath sounds normal. No stridor. No respiratory distress. He has no wheezes. He has no rales.   Abdominal: Soft. Bowel sounds are normal.   Musculoskeletal: He exhibits edema (Inc site proximal ) and tenderness (Inc site improved).   Symmetrical = movements      Neurological: He is alert and oriented to person, place, and time. No cranial nerve deficit.   Skin: Skin is warm and dry. Capillary refill takes less than 2 seconds. No rash noted. No erythema. No pallor.   L cea incision healing well Mild proximal edema Erythema resolved.   RUE at automatic BP cuff near resolved.     Psychiatric: His behavior is normal. Judgment normal.   Nursing note and vitals reviewed.      Vitals:    07/10/18 1407   BP: 162/92   Pulse: 79   Temp: 98 °F (36.7 °C)   SpO2: 99%         Assessment/Plan   Independent Review of Radiographic Studies:    None with today's visit     1. Surgical aftercare, circulatory system  Recovering well  Increase activities  May drive.  No Limitations after 7/5/18  Cleanse incisional site with baby shampoo      2. Bilateral carotid artery stenosis  SP surgical revascularization.  No neuro deficit  Carotid US in 6 wks   Continue medical management with antiplateletn, ARB, VIT D, and statin  Control BS  Continue smoking reduction by 25 %  Pt agrees.         Recheck as scheduled  unless If you should experience any neurological symptoms including but not limited to visual or speech disturbances confusion, seizures, or weakness of limbs of one side of your body notify [facility] and V[facility] center immediately for evaluation or if after hours present to the nearest Emergency Department.

## 2018-08-23 ENCOUNTER — OFFICE VISIT (OUTPATIENT)
Dept: CARDIAC SURGERY | Facility: CLINIC | Age: 63
End: 2018-08-23

## 2018-08-23 VITALS
HEART RATE: 75 BPM | HEIGHT: 72 IN | OXYGEN SATURATION: 98 % | TEMPERATURE: 98.4 F | DIASTOLIC BLOOD PRESSURE: 75 MMHG | BODY MASS INDEX: 27.36 KG/M2 | SYSTOLIC BLOOD PRESSURE: 130 MMHG | WEIGHT: 202 LBS

## 2018-08-23 DIAGNOSIS — I65.23 BILATERAL CAROTID ARTERY STENOSIS: Primary | ICD-10-CM

## 2018-08-23 DIAGNOSIS — Z48.812 SURGICAL AFTERCARE, CIRCULATORY SYSTEM: ICD-10-CM

## 2018-08-23 PROBLEM — I65.22 LEFT CAROTID STENOSIS: Status: RESOLVED | Noted: 2018-06-27 | Resolved: 2018-08-23

## 2018-08-23 PROBLEM — G89.18 POSTOPERATIVE PAIN: Status: RESOLVED | Noted: 2018-05-24 | Resolved: 2018-08-23

## 2018-08-23 PROBLEM — I65.29 OCCLUSION AND STENOSIS OF CAROTID ARTERY: Status: RESOLVED | Noted: 2018-04-19 | Resolved: 2018-08-23

## 2018-08-23 PROCEDURE — 99024 POSTOP FOLLOW-UP VISIT: CPT | Performed by: NURSE PRACTITIONER

## 2018-09-04 NOTE — PROGRESS NOTES
Subjective   Patient ID: Jewel Billy is a 63 y.o. male is here today for surgical  follow-up SP L CEA with known bilateral carotid stenosis.  Chief Complaint   Patient presents with   • Carotid Artery Disease     6 week follow up    L neck pain and swelling   RUE rash and marks from BP cuff.  Wounds and area improved.  Denies any neurosensory symptoms.     Carotid Artery Disease   Associated symptoms include neck pain (sore neck ). Pertinent negatives include no change in bowel habit, chest pain, chills, coughing, fever, headaches, nausea, numbness, rash, sore throat or vomiting.     PCP:  Kelsey MCCLELLAN, Kaylee MCCLELLAN     63  y.o. male with HTN(stable, increased risk stroke, rupture), Hyperlipidemia(stable, increased risk cardiovascular events), Diabetes Mellitus(stable, increased risk cardiovascular events) and Smoker(uncontrolled, increased risk cardiovascular events), Carotid Stenosis(new, increased risk stroke).  smokes 1/2 PPD.  Rushing noises in head x 1 year.  Carotid bruits noted on exam.  Carotid stenosis noted on duplex imaging.  No TIA stroke amaurosis.  No MI claudication. No other associated signs, symptoms or modifying factors.  Feels has recovered from  CEA.       4/4/2018 Carotid Duplex:  AWILDA >70% (321cm/s, ratio 4.9) antegrade vert.  LICA 50-69% (221cm/s, ratio 1.9)   4/24/2018 CTA Carotids:  AWILDA 90%, LICA 80%  5/23/18  RIGHT CEA  5/24/18  Hospital discharge  6/27/18  LEFT CEA  6/28/18  Hospital discharge  8/23/18  Carotid Duplex:  AWILDA 0-49 % mPSV 114c/s  ratio 2.1  LICA 0-49%  mPSV 92c/s  ratio 1.0      The following portions of the patient's history were reviewed and updated as appropriate: allergies, current medications, past family history, past medical history, past social history, past surgical history and problem list.  Allergies   Allergen Reactions   • Vancomycin Unknown (See Comments)     Pt states had accident in 1980's and mother told him he had a reaction to this  medication during that time       Current Outpatient Prescriptions:   •  aspirin 81 MG EC tablet, Take 81 mg by mouth Daily., Disp: , Rfl:   •  candesartan (ATACAND) 32 MG tablet, Take 32 mg by mouth Daily., Disp: , Rfl:   •  cholecalciferol (VITAMIN D3) 1000 units tablet, Take 2,000 Units by mouth Daily., Disp: , Rfl:   •  CloNIDine (CATAPRES) 0.1 MG tablet, Take 0.1 mg by mouth Every 8 (Eight) Hours., Disp: , Rfl:   •  diazePAM (VALIUM) 5 MG tablet, Take 5 mg by mouth 2 (Two) Times a Day As Needed for Anxiety., Disp: , Rfl:   •  famciclovir (FAMVIR) 500 MG tablet, Take 500 mg by mouth 3 (Three) Times a Week if Needed., Disp: , Rfl:   •  HYDROcodone-acetaminophen (NORCO)  MG per tablet, Take 1 tablet by mouth Every 8 (Eight) Hours As Needed for Moderate Pain ., Disp: , Rfl:   •  hydroxychloroquine (PLAQUENIL) 200 MG tablet, Take 200 mg by mouth Every 12 (Twelve) Hours., Disp: , Rfl:   •  levothyroxine (SYNTHROID, LEVOTHROID) 200 MCG tablet, Take 200 mcg by mouth Daily., Disp: , Rfl:   •  meloxicam (MOBIC) 15 MG tablet, Take 15 mg by mouth Daily., Disp: , Rfl:   •  metFORMIN (GLUCOPHAGE) 500 MG tablet, Take 500 mg by mouth 2 (Two) Times a Day With Meals., Disp: , Rfl:   •  methocarbamol (ROBAXIN) 750 MG tablet, Take 750 mg by mouth 3 (Three) Times a Day., Disp: , Rfl:   •  pravastatin (PRAVACHOL) 40 MG tablet, Take 40 mg by mouth Daily., Disp: , Rfl:   •  predniSONE (DELTASONE) 5 MG tablet, Take 5 mg by mouth Daily., Disp: , Rfl:   •  pregabalin (LYRICA) 100 MG capsule, Take 150 mg by mouth 2 (Two) Times a Day., Disp: , Rfl:     Review of Systems   Constitution: Negative for chills, decreased appetite, fever and malaise/fatigue.   HENT: Negative for hearing loss, hoarse voice, nosebleeds, sore throat and stridor.    Eyes: Negative for visual disturbance.   Cardiovascular: Negative for chest pain, cyanosis, dyspnea on exertion, leg swelling, near-syncope and syncope.   Respiratory: Negative for cough,  hemoptysis, shortness of breath and wheezing.         Continues to smoke   Hematologic/Lymphatic: Negative for bleeding problem. Does not bruise/bleed easily.   Skin: Negative for color change, flushing, poor wound healing and rash.        INC healing      Musculoskeletal: Positive for neck pain (sore neck ). Negative for falls, muscle cramps and muscle weakness.   Gastrointestinal: Negative for change in bowel habit, hematemesis, melena, nausea and vomiting.   Genitourinary: Negative for hematuria.   Neurological: Negative for brief paralysis, focal weakness, headaches, light-headedness, numbness, paresthesias and sensory change.             Denies any amaurosis fugae, paresthesias, paralysis, change in speech, or neurosensory symptoms.  Denies any rhythmic pulsation in ears   Funny feeling at incisional site is improved      Psychiatric/Behavioral: Negative for altered mental status. The patient is not nervous/anxious.    Allergic/Immunologic: Negative for hives.        Objective   Physical Exam   Constitutional: He is oriented to person, place, and time. He appears well-nourished. No distress.   Body mass index is 28.13 kg/m².     HENT:   Head: Normocephalic.   Mouth/Throat: Oropharynx is clear and moist.   Tongue midline Facial movements symmetrical Sensation intact including proximal to incisional line, feels odd lateral to incision      Eyes: Pupils are equal, round, and reactive to light. Conjunctivae and EOM are normal.   Neck: Neck supple. No JVD present.   ROM limited by discomfort and edema Improved    Cardiovascular: Normal rate, regular rhythm, normal heart sounds and intact distal pulses.    Pulses:       Carotid pulses are 1+ on the right side.       Radial pulses are 2+ on the right side, and 2+ on the left side.   Wearing Boots    Pulmonary/Chest: Effort normal and breath sounds normal. No stridor. No respiratory distress. He has no wheezes. He has no rales.   Abdominal: Soft. Bowel sounds are  normal.   Musculoskeletal: He exhibits no tenderness.   Symmetrical = movements      Neurological: He is alert and oriented to person, place, and time. No cranial nerve deficit.   Skin: Skin is warm and dry. Capillary refill takes less than 2 seconds. No rash noted. No erythema. No pallor.   L cea incision well healed    Psychiatric: His behavior is normal. Judgment normal.   Nursing note and vitals reviewed.      Vitals:    08/23/18 1543   BP: 130/75   Pulse: 75   Temp: 98.4 °F (36.9 °C)   SpO2: 98%         Assessment/Plan   Independent Review of Radiographic Studies:    8/23/18  Carotid Duplex:  AWILDA 0-49 % mPSV 114c/s  ratio 2.1  LICA 0-49%  mPSV 92c/s  ratio 1.0    1. Surgical aftercare, circulatory system  Resolved.     2. Bilateral carotid artery stenosis  Mild bilateral disease SP surgery   Continue medical management with antiplatelet, ARB, VIT D, and statin  Continue smoking reduction  Early am exercise  Heart healthy diet   Medications:  Aspirin Atacand Vit D Pravastatin  Repeat in 6 mths unless If you should experience any neurological symptoms including but not limited to visual or speech disturbances confusion, seizures, or weakness of limbs of one side of your body notify Heart and Vascular center immediately for evaluation or if after hours present to the nearest Emergency Department.    Recheck as scheduled  unless If you should experience any neurological symptoms including but not limited to visual or speech disturbances confusion, seizures, or weakness of limbs of one side of your body notify Heart and Vascular center immediately for evaluation or if after hours present to the nearest Emergency Department.

## 2020-04-01 DIAGNOSIS — I73.9 PVD (PERIPHERAL VASCULAR DISEASE) (HCC): Primary | ICD-10-CM

## 2020-04-23 ENCOUNTER — OFFICE VISIT (OUTPATIENT)
Dept: CARDIAC SURGERY | Facility: CLINIC | Age: 65
End: 2020-04-23

## 2020-04-23 VITALS
WEIGHT: 189.6 LBS | HEART RATE: 90 BPM | BODY MASS INDEX: 25.68 KG/M2 | OXYGEN SATURATION: 98 % | SYSTOLIC BLOOD PRESSURE: 118 MMHG | HEIGHT: 72 IN | DIASTOLIC BLOOD PRESSURE: 82 MMHG

## 2020-04-23 DIAGNOSIS — E11.42 CONTROLLED TYPE 2 DIABETES MELLITUS WITH DIABETIC POLYNEUROPATHY, WITHOUT LONG-TERM CURRENT USE OF INSULIN (HCC): ICD-10-CM

## 2020-04-23 DIAGNOSIS — I73.9 PVD (PERIPHERAL VASCULAR DISEASE) (HCC): ICD-10-CM

## 2020-04-23 DIAGNOSIS — I10 BENIGN ESSENTIAL HTN: ICD-10-CM

## 2020-04-23 DIAGNOSIS — I65.23 CAROTID STENOSIS, ASYMPTOMATIC, BILATERAL: Primary | ICD-10-CM

## 2020-04-23 DIAGNOSIS — I65.23 OCCLUSION AND STENOSIS OF BILATERAL CAROTID ARTERIES: ICD-10-CM

## 2020-04-23 DIAGNOSIS — E78.2 MIXED HYPERLIPIDEMIA: ICD-10-CM

## 2020-04-23 DIAGNOSIS — F17.218 NICOTINE DEPENDENCE, CIGARETTES, WITH OTHER NICOTINE-INDUCED DISORDERS: ICD-10-CM

## 2020-04-23 PROCEDURE — 99214 OFFICE O/P EST MOD 30 MIN: CPT | Performed by: THORACIC SURGERY (CARDIOTHORACIC VASCULAR SURGERY)

## 2020-04-23 RX ORDER — FAMOTIDINE 20 MG/1
TABLET, FILM COATED ORAL
COMMUNITY
Start: 2020-02-21

## 2020-04-23 RX ORDER — CETIRIZINE HYDROCHLORIDE 10 MG/1
TABLET ORAL
COMMUNITY
End: 2021-06-24 | Stop reason: ALTCHOICE

## 2020-04-23 NOTE — PROGRESS NOTES
4/23/2020    Jewel Billy  1955    Chief Complaint:    Chief Complaint   Patient presents with   • Peripheral Vascular Disease       HPI:      PCP:  Kaylee Marie FNP  Cardiology:  Dr Rowan  Podiatry:  Dr Silverman    64y.o. male with HTN(stable, increased risk stroke, rupture), Hyperlipidemia(stable, increased risk cardiovascular events), Diabetes Mellitus(stable, increased risk cardiovascular events) and Smoker(uncontrolled, increased risk cardiovascular events), Carotid Stenosis(new, increased risk stroke).  smokes 1/2 PPD.  Rushing noises in head x 1 year.  Carotid bruits noted on exam.   Carotid stenosis noted on duplex imaging.  No TIA stroke amaurosis.  No MI claudication. No other associated signs, symptoms or modifying factors.     4/2018 Carotid Duplex:  AWILDA >70% (321cm/s, ratio 4.9) antegrade vert.  LICA 50-69% (221cm/s, ratio 1.9)  4/2018 CTA Carotids:  AWILDA 90%, LICA 80%  5/2018  RIGHT CEA  6/2018  LEFT CEA  8/2018 Carotid Duplex:  AWILDA 0-49% mPSV 114c/s  ratio 2.1  LICA 0-49%  mPSV 92c/s  ratio 1.0  4/2020 Carotid Duplex:  AWILDA 0-49% (114/41cm/s, ratio 1.6)  LICA 0-49%  (111/38cm/s, ratio 1.3)    3/2020 Lower extremity arterial duplex([facility]):  RIGHT triphasic CFA, biphasic SFA, triphasic popliteal, peroneal tri/biphasic, JL retrograde, PTA monophasic.  LEFT triphasic CFA, SFA, popliteal, JL retrograde, PTA peroneal mixed tri/bi/monophasic.  4/2020 SCOOBY:  RIGHT .98 triphasic.  LEFT 1.0 triphasic.    8/2017 ECG:  NSR 85, QTc 397    The following portions of the patient's history were reviewed and updated as appropriate: allergies, current medications, past family history, past medical history, past social history, past surgical history and problem list.  Recent images independently reviewed.  Available laboratory values reviewed.    PMH:  Past Medical History:   Diagnosis Date   • Acquired hypothyroidism    • Anxiety and depression    • Arthritis    • Coronary artery disease    • Diabetes  mellitus (CMS/HCC)     on metformin rt to steriod use   • Full dentures    • Hashimoto's thyroiditis    • Head injury    • Hypertension    • PTSD (post-traumatic stress disorder)    • Simple goiter    • Sleep apnea    • Tobacco dependence syndrome    • Wears glasses      Past Surgical History:   Procedure Laterality Date   • CAROTID ENDARTERECTOMY Right 5/23/2018    Procedure: RIGHT CAROTID ENDARTERECTOMY carotid cerebral arteriogram     (C-ARM#2);  Surgeon: Bayron Griffin MD;  Location: [facility] OR;  Service: Vascular   • CAROTID ENDARTERECTOMY Left 6/27/2018    Procedure: CAROTID ENDARTERECTOMY                          (C-ARM);  Surgeon: Bayron Griffin MD;  Location: [facility] OR;  Service: Vascular   • CARPAL TUNNEL RELEASE Bilateral    • COLON RESECTION     • SHOULDER ARTHROSCOPY Left    • VASECTOMY       Family History   Problem Relation Age of Onset   • Hypertension Other    • Thyroid disease Other      Social History     Tobacco Use   • Smoking status: Current Every Day Smoker     Packs/day: 0.50     Years: 50.00     Pack years: 25.00     Types: Cigarettes   • Smokeless tobacco: Never Used   • Tobacco comment: Ready to reduce cigarette consumption   Substance Use Topics   • Alcohol use: No   • Drug use: No       ALLERGIES:  Allergies   Allergen Reactions   • Vancomycin Unknown (See Comments)     Pt states had accident in 1980's and mother told him he had a reaction to this medication during that time         MEDICATIONS:    Current Outpatient Medications:   •  aspirin 81 MG EC tablet, Take 81 mg by mouth Daily., Disp: , Rfl:   •  candesartan (ATACAND) 32 MG tablet, Take 32 mg by mouth Daily., Disp: , Rfl:   •  cetirizine (ZyrTEC Allergy) 10 MG tablet, Zyrtec 10 mg tablet  Take 1 tablet every day by oral route., Disp: , Rfl:   •  cholecalciferol (VITAMIN D3) 1000 units tablet, Take 2,000 Units by mouth Daily., Disp: , Rfl:   •  CloNIDine (CATAPRES) 0.1 MG tablet, Take 0.1 mg by mouth Every 8 (Eight)  "Hours., Disp: , Rfl:   •  Dulaglutide (Trulicity) 0.75 MG/0.5ML solution pen-injector, Trulicity 0.75 mg/0.5 mL subcutaneous pen injector  INJECT CONENTS OF 1 PEN ONCE WEEKLY, Disp: , Rfl:   •  famciclovir (FAMVIR) 500 MG tablet, Take 500 mg by mouth 3 (Three) Times a Week if Needed., Disp: , Rfl:   •  famotidine (PEPCID) 20 MG tablet, , Disp: , Rfl:   •  Glucose Blood (COOL BLOOD GLUCOSE TEST STRIPS VI), Every 8 (Eight) Hours., Disp: , Rfl:   •  levothyroxine (SYNTHROID, LEVOTHROID) 200 MCG tablet, Take 200 mcg by mouth Daily., Disp: , Rfl:   •  metFORMIN (GLUCOPHAGE) 500 MG tablet, Take 500 mg by mouth 2 (Two) Times a Day With Meals., Disp: , Rfl:   •  methocarbamol (ROBAXIN) 750 MG tablet, Take 750 mg by mouth 3 (Three) Times a Day., Disp: , Rfl:   •  pravastatin (PRAVACHOL) 40 MG tablet, Take 40 mg by mouth Daily., Disp: , Rfl:   •  diazePAM (VALIUM) 5 MG tablet, Take 5 mg by mouth 2 (Two) Times a Day As Needed for Anxiety., Disp: , Rfl:   •  hydroxychloroquine (PLAQUENIL) 200 MG tablet, Take 200 mg by mouth Every 12 (Twelve) Hours., Disp: , Rfl:   •  meloxicam (MOBIC) 15 MG tablet, Take 15 mg by mouth Daily., Disp: , Rfl:     Review of Systems   Review of Systems   Constitution: Positive for malaise/fatigue. Negative for weight loss.   Cardiovascular: Positive for claudication, irregular heartbeat and leg swelling. Negative for chest pain and dyspnea on exertion.   Respiratory: Positive for sleep disturbances due to breathing. Negative for cough and shortness of breath.    Hematologic/Lymphatic: Bruises/bleeds easily.   Skin: Negative for color change and poor wound healing.   Musculoskeletal: Positive for arthritis, back pain, muscle weakness and neck pain.   Neurological: Negative for dizziness, numbness and weakness.       Physical Exam   Vitals:    04/23/20 1339   BP: 118/82   BP Location: Left arm   Pulse: 90   SpO2: 98%   Weight: 86 kg (189 lb 9.6 oz)   Height: 182.9 cm (72\")     Physical Exam "   Constitutional: He is oriented to person, place, and time. He is active and cooperative. He does not appear ill. No distress.   HENT:   Right Ear: Hearing normal.   Left Ear: Hearing normal.   Nose: No nasal deformity. No epistaxis.   Mouth/Throat: He does not have dentures. Normal dentition.   Cardiovascular: Normal rate and regular rhythm.   No murmur heard.  Pulses:       Carotid pulses are 2+ on the right side, and 2+ on the left side.       Radial pulses are 2+ on the right side, and 2+ on the left side.        Dorsalis pedis pulses are 2+ on the right side, and 2+ on the left side.        Posterior tibial pulses are 2+ on the right side, and 2+ on the left side.   Bilateral hammer toe deformity  Callus feet   Pulmonary/Chest: Effort normal and breath sounds normal.   Abdominal: Soft. He exhibits no distension and no mass. There is no tenderness.   Musculoskeletal: He exhibits no deformity.   Gait normal.    Neurological: He is alert and oriented to person, place, and time. He has normal strength.   Skin: Skin is warm and dry. No cyanosis or erythema. No pallor.   No venous staining   Psychiatric: He has a normal mood and affect. His speech is normal. Judgment and thought content normal.       BUN   Date Value Ref Range Status   06/27/2018 12 7 - 21 mg/dL Final     Creatinine   Date Value Ref Range Status   06/27/2018 0.92 0.70 - 1.30 mg/dL Final     eGFR Non  Amer   Date Value Ref Range Status   06/27/2018 83 49 - 113 mL/min/1.73 Final       ASSESSMENT:  Jewel was seen today for peripheral vascular disease.    Diagnoses and all orders for this visit:    Carotid stenosis, asymptomatic, bilateral  -     Duplex Carotid Ultrasound CAR    Benign essential HTN    Mixed hyperlipidemia    Controlled type 2 diabetes mellitus with diabetic polyneuropathy, without long-term current use of insulin (CMS/[facility])    Nicotine dependence, cigarettes, with other nicotine-induced disorders    PVD (peripheral vascular  disease) (CMS/HCC)  -     Duplex Carotid Ultrasound CAR; Future    Occlusion and stenosis of bilateral carotid arteries   -     Duplex Carotid Ultrasound CAR; Future    PLAN:  Detailed discussion with Jewel Billy regarding situation and options.  Stable carotid stenosis.  Normal arterial perfusion both lower extremities..  Multiple risk factors with severe comorbidities.  No intervention indicated at this time.  Will follow with interval imaging.  Risks, benefits discussed.  Understands and wishes to proceed with plan.    Return in 1 year with carotid duplex    Return after above studies complete  Smoking cessation advised and assistance options offered including behavioral counseling ([facility]), Nicotine replacement therapy (patches or gum), pharmacologic therapy (Chantix, Wellbutrin). patient understands that continued use of tobacco products increases risk of heart disease, stroke, cancer; counseling for 3-5min.  Obesity Class  0. Increased risk cardiovascular events, sleep and breathing disorders, joint issues, type 2 diabetes mellitus. Options for weight management, heart healthy diet, exercise programs, and associated health risks of obesity discussed.  Recommended regular physical activity, progressive walking program.  Continue current medications as directed.    Thank you for the opportunity to participate in this patient's care.    Copy to primary care provider.    EMR Dragon/Transcription disclaimer:   Much of this encounter note is an electronic transcription/translation of spoken language to printed text. The electronic translation of spoken language may permit erroneous, or at times, nonsensical words or phrases to be inadvertently transcribed; Although I have reviewed the note for such errors, some may still exist.

## 2020-05-03 LAB
BH CV XLRA MEAS LEFT DIST CCA EDV: 19.2 CM/SEC
BH CV XLRA MEAS LEFT DIST CCA PSV: 90 CM/SEC
BH CV XLRA MEAS LEFT DIST ICA EDV: 35.2 CM/SEC
BH CV XLRA MEAS LEFT DIST ICA PSV: 103 CM/SEC
BH CV XLRA MEAS LEFT MID ICA EDV: 37.5 CM/SEC
BH CV XLRA MEAS LEFT MID ICA PSV: 104 CM/SEC
BH CV XLRA MEAS LEFT PROX CCA EDV: 21.1 CM/SEC
BH CV XLRA MEAS LEFT PROX CCA PSV: 105 CM/SEC
BH CV XLRA MEAS LEFT PROX ECA EDV: 15.6 CM/SEC
BH CV XLRA MEAS LEFT PROX ECA PSV: 111 CM/SEC
BH CV XLRA MEAS LEFT PROX ICA EDV: 12.6 CM/SEC
BH CV XLRA MEAS LEFT PROX ICA PSV: 75 CM/SEC
BH CV XLRA MEAS LEFT VERTEBRAL A EDV: 25.2 CM/SEC
BH CV XLRA MEAS LEFT VERTEBRAL A PSV: 92.4 CM/SEC
BH CV XLRA MEAS RIGHT DIST CCA EDV: 25.3 CM/SEC
BH CV XLRA MEAS RIGHT DIST CCA PSV: 120 CM/SEC
BH CV XLRA MEAS RIGHT DIST ICA EDV: 39.4 CM/SEC
BH CV XLRA MEAS RIGHT DIST ICA PSV: 114 CM/SEC
BH CV XLRA MEAS RIGHT MID ICA EDV: 41.4 CM/SEC
BH CV XLRA MEAS RIGHT MID ICA PSV: 108 CM/SEC
BH CV XLRA MEAS RIGHT PROX CCA EDV: 28.6 CM/SEC
BH CV XLRA MEAS RIGHT PROX CCA PSV: 149 CM/SEC
BH CV XLRA MEAS RIGHT PROX ECA EDV: 11.6 CM/SEC
BH CV XLRA MEAS RIGHT PROX ECA PSV: 96.5 CM/SEC
BH CV XLRA MEAS RIGHT PROX ICA EDV: 31.8 CM/SEC
BH CV XLRA MEAS RIGHT PROX ICA PSV: 85.2 CM/SEC
BH CV XLRA MEAS RIGHT VERTEBRAL A EDV: 0 CM/SEC
BH CV XLRA MEAS RIGHT VERTEBRAL A PSV: 80.4 CM/SEC

## 2020-06-01 ENCOUNTER — OFFICE VISIT (OUTPATIENT)
Dept: CARDIOLOGY | Facility: CLINIC | Age: 65
End: 2020-06-01

## 2020-06-01 VITALS
WEIGHT: 189 LBS | OXYGEN SATURATION: 98 % | DIASTOLIC BLOOD PRESSURE: 70 MMHG | SYSTOLIC BLOOD PRESSURE: 120 MMHG | BODY MASS INDEX: 25.6 KG/M2 | HEIGHT: 72 IN | HEART RATE: 87 BPM

## 2020-06-01 DIAGNOSIS — R00.2 PALPITATIONS: ICD-10-CM

## 2020-06-01 DIAGNOSIS — R06.09 DYSPNEA ON EXERTION: Primary | ICD-10-CM

## 2020-06-01 DIAGNOSIS — I10 BENIGN ESSENTIAL HTN: ICD-10-CM

## 2020-06-01 DIAGNOSIS — E78.2 MIXED HYPERLIPIDEMIA: ICD-10-CM

## 2020-06-01 DIAGNOSIS — I10 BENIGN ESSENTIAL HTN: Primary | ICD-10-CM

## 2020-06-01 DIAGNOSIS — E11.649 TYPE 2 DIABETES MELLITUS WITH HYPOGLYCEMIA WITHOUT COMA, WITHOUT LONG-TERM CURRENT USE OF INSULIN (HCC): ICD-10-CM

## 2020-06-01 DIAGNOSIS — I65.23 BILATERAL CAROTID ARTERY STENOSIS: ICD-10-CM

## 2020-06-01 DIAGNOSIS — R07.89 OTHER CHEST PAIN: ICD-10-CM

## 2020-06-01 PROCEDURE — 93000 ELECTROCARDIOGRAM COMPLETE: CPT | Performed by: INTERNAL MEDICINE

## 2020-06-01 PROCEDURE — 99204 OFFICE O/P NEW MOD 45 MIN: CPT | Performed by: INTERNAL MEDICINE

## 2020-06-01 NOTE — PROGRESS NOTES
[facility] Cardiology  OFFICE CONSULT NOTE    Patient Name: Jewel Billy  Age/Sex: 65 y.o. male  : 1955  MRN: [id number]      Referring Provider: Avinash Jensen MD  [address] [place] [address]        Chief Complaint: Dyspnea on exertion, palpitations and chest pain  History of Present Illness:  Jewel Billy is a 65 y.o. male who has had shortness of breath over the last several months.  He has been increasing for a while.  He does smoke and has no history of COPD but has smoked for many years.  He says he notices shortness of air when she exerts himself.  He has had no PND.  Says his dyspnea gets better when he stops but he is doing.    He also notices palpitations.  He says he can feel his chest along with.  Along with hearing in his ears..  He said when he feels these he noticed chest pain.  Chest pain is mainly sharp.  Is mainly with the palpitations and he really does not notice it that much with the shortness of air.  He has multiple risk factors including all 5: Male gender, hypertension, diabetes, history of high cholesterol, and smoking  Last Echo: None    Last Cath: About 15 years ago.  He was told this was normal.  This was in [place].      Past Medical History:  Past Medical History:   Diagnosis Date   • Acquired hypothyroidism    • Anxiety and depression    • Arthritis    • Coronary artery disease    • Diabetes mellitus (CMS/HCC)     on metformin rt to steriod use   • Full dentures    • Hashimoto's thyroiditis    • Head injury    • Hypertension    • PTSD (post-traumatic stress disorder)    • Simple goiter    • Sleep apnea    • Tobacco dependence syndrome    • Wears glasses        PAST SURGERY   Past Surgical History:   Procedure Laterality Date   • CAROTID ENDARTERECTOMY Right 2018    Procedure: RIGHT CAROTID ENDARTERECTOMY carotid cerebral arteriogram     (C-ARM#2);  Surgeon: Bayron Griffin MD;  Location: [facility] OR;   Service: Vascular   • CAROTID ENDARTERECTOMY Left 6/27/2018    Procedure: CAROTID ENDARTERECTOMY                          (C-ARM);  Surgeon: Bayron Griffin MD;  Location: [facility];  Service: Vascular   • CARPAL TUNNEL RELEASE Bilateral    • COLON RESECTION     • SHOULDER ARTHROSCOPY Left    • VASECTOMY         Home Medications:      Current Outpatient Medications:   •  aspirin 81 MG EC tablet, Take 81 mg by mouth Daily., Disp: , Rfl:   •  candesartan (ATACAND) 32 MG tablet, Take 32 mg by mouth Daily., Disp: , Rfl:   •  cetirizine (ZyrTEC Allergy) 10 MG tablet, Zyrtec 10 mg tablet  Take 1 tablet every day by oral route., Disp: , Rfl:   •  cholecalciferol (VITAMIN D3) 1000 units tablet, Take 2,000 Units by mouth Daily., Disp: , Rfl:   •  CloNIDine (CATAPRES) 0.1 MG tablet, Take 0.1 mg by mouth Every 8 (Eight) Hours., Disp: , Rfl:   •  Dulaglutide (Trulicity) 0.75 MG/0.5ML solution pen-injector, Trulicity 0.75 mg/0.5 mL subcutaneous pen injector  INJECT CONENTS OF 1 PEN ONCE WEEKLY, Disp: , Rfl:   •  famciclovir (FAMVIR) 500 MG tablet, Take 500 mg by mouth 3 (Three) Times a Week if Needed., Disp: , Rfl:   •  famotidine (PEPCID) 20 MG tablet, , Disp: , Rfl:   •  Glucose Blood (COOL BLOOD GLUCOSE TEST STRIPS VI), Every 8 (Eight) Hours., Disp: , Rfl:   •  hydroxychloroquine (PLAQUENIL) 200 MG tablet, Take 200 mg by mouth Every 12 (Twelve) Hours., Disp: , Rfl:   •  levothyroxine (SYNTHROID, LEVOTHROID) 200 MCG tablet, Take 200 mcg by mouth Daily., Disp: , Rfl:   •  metFORMIN (GLUCOPHAGE) 500 MG tablet, Take 500 mg by mouth 2 (Two) Times a Day With Meals., Disp: , Rfl:   •  methocarbamol (ROBAXIN) 750 MG tablet, Take 750 mg by mouth 3 (Three) Times a Day., Disp: , Rfl:   •  pravastatin (PRAVACHOL) 40 MG tablet, Take 40 mg by mouth Daily., Disp: , Rfl:   •  diazePAM (VALIUM) 5 MG tablet, Take 5 mg by mouth 2 (Two) Times a Day As Needed for Anxiety., Disp: , Rfl:   •  meloxicam (MOBIC) 15 MG tablet, Take 15 mg by  mouth Daily., Disp: , Rfl:     Allergies:  Allergies   Allergen Reactions   • Vancomycin Unknown (See Comments)     Pt states had accident in 1980's and mother told him he had a reaction to this medication during that time        Social History:  Social History     Socioeconomic History   • Marital status: Single     Spouse name: Not on file   • Number of children: Not on file   • Years of education: Not on file   • Highest education level: Not on file   Tobacco Use   • Smoking status: Current Every Day Smoker     Packs/day: 0.50     Years: 50.00     Pack years: 25.00     Types: Cigarettes   • Smokeless tobacco: Never Used   • Tobacco comment: Ready to reduce cigarette consumption   Substance and Sexual Activity   • Alcohol use: No   • Drug use: No   • Sexual activity: Defer        Family History:  Family History   Problem Relation Age of Onset   • Hypertension Other    • Thyroid disease Other          Review of Systems:   Constitution: No chills, no rigors, no unexplained weight loss or weight gain    Eyes:  No diplopia, no blurred vision, no loss of vision, conjunctiva is pink and sclera is anicteric    ENT:  No tinnitus, no otorrhea, no epistaxis, no sore throat   Respiratory: No cough, no hemoptysis    Cardiovascular:  As mentioned in HPI    Gastrointestinal: No nausea, no vomiting, no hematemesis, no diarrhea or constipation, no melena    Genitourinary: No frequency of dysuria no hematuria    Integument: positive for  No pruritis and  no skin rash    Hematologic / Lymphatic: No excessive bleeding, easy bruising, fatigue, lymphadenopathy and petechiae    Musculoskeletal: joint pain when he walks, joint stiffness, joint swelling, muscle pain, muscle weakness and neck pain    Neurological: No dizziness, headaches, light headedness, seizures and vertigo or stroke    Behavioral/Psych: No depression, or bipolar disorder    Endocrine: no h/o diabetes, hyperlipidemia or thyroid problems        Vitals:    06/01/20 0832     BP: 120/70   Pulse: 87   SpO2: 98%       Body mass index is 25.63 kg/m².          Physical Exam:   General Appearance:    Alert, oriented, cooperative, in no acute distress     Head:    Normocephalic, atraumatic, without obvious abnormality     Eyes:            Lids and lashes normal, conjunctivae and sclerae normal, no   icterus, no pallor     Ears:    Ears appear intact with no abnormalities noted     Throat:   Mucous membranes pink and moist     Neck:   Supple, trachea midline, no carotid bruit, no JVD     Lungs:     Air enty equal,  Clear to auscultation, respirations regular, Unlabored. No wheezes, rales, rhonchi    Heart:    Regular rhythm and normal rate, normal S1 and S2, no            murmur, no gallop,      Abdomen:     Normal bowel sounds, no masses, liver and spleen nonpalpable, soft, non-tender, non-distended, no guarding, no rebound tenderness       Extremities:   Moves all extremities well, no edema, no cyanosis, no              Redness, no clubbing     Pulses:   Pulses palpable and equal bilaterally       Neurologic:   Alert and oriented to person, place, and time. No focal neurological deficits       Lab Review:     Office Visit on 04/23/2020   Component Date Value Ref Range Status   • Prox CCA PSV 04/23/2020 105.0  cm/sec Final   • Prox CCA PSV 04/23/2020 149.0  cm/sec Final   • Prox CCA EDV 04/23/2020 21.1  cm/sec Final   • Prox CCA EDV 04/23/2020 28.6  cm/sec Final   • Dist CCA PSV 04/23/2020 90.0  cm/sec Final   • Dist CCA PSV 04/23/2020 120.0  cm/sec Final   • Dist CCA EDV 04/23/2020 19.2  cm/sec Final   • Dist CCA EDV 04/23/2020 25.3  cm/sec Final   • Prox ECA PSV 04/23/2020 111.0  cm/sec Final   • Prox ECA PSV 04/23/2020 96.5  cm/sec Final   • Prox ECA EDV 04/23/2020 15.6  cm/sec Final   • Prox ECA EDV 04/23/2020 11.6  cm/sec Final   • Prox ICA PSV 04/23/2020 75.0  cm/sec Final   • Prox ICA PSV 04/23/2020 85.2  cm/sec Final   • Prox ICA EDV 04/23/2020 12.6  cm/sec Final   • Prox ICA EDV  04/23/2020 31.8  cm/sec Final   • Mid ICA PSV 04/23/2020 104.0  cm/sec Final   • Mid ICA PSV 04/23/2020 108.0  cm/sec Final   • Mid ICA EDV 04/23/2020 37.5  cm/sec Final   • Mid ICA EDV 04/23/2020 41.4  cm/sec Final   • Dist ICA PSV 04/23/2020 103.0  cm/sec Final   • Dist ICA PSV 04/23/2020 114.0  cm/sec Final   • Dist ICA EDV 04/23/2020 35.2  cm/sec Final   • Dist ICA EDV 04/23/2020 39.4  cm/sec Final   • Vertebral A PSV 04/23/2020 92.4  cm/sec Final   • Vertebral A PSV 04/23/2020 80.4  cm/sec Final   • Vertebral A EDV 04/23/2020 25.2  cm/sec Final   • Vertebral A EDV 04/23/2020 0  cm/sec Final   Hospital Outpatient Visit on 04/23/2020   Component Date Value Ref Range Status   • RIGHT DORSALIS PEDIS SYS MAX 04/23/2020 138  mmHg Final   • RIGHT POST TIBIAL SYS MAX 04/23/2020 139  mmHg Final   • RIGHT SCOOBY RATIO 04/23/2020 0.98   Final   • LEFT DORSALIS PEDIS SYS MAX 04/23/2020 142  mmHg Final   • LEFT POST TIBIAL SYS MAX 04/23/2020 138  mmHg Final   • LEFT SCOOBY RATIO 04/23/2020 1   Final   • Upper arterial right arm brachial * 04/23/2020 140  mmHg Final   • Upper arterial left arm brachial s* 04/23/2020 142  mmHg Final   ]    Assessment/Plan     1. Dyspnea on exertion  Obviously this could be cardiac related with his multiple risk factors and history of peripheral vascular disease with carotid stenoses and bilateral carotid endarterectomies.  This also could be somewhat loculated.  We will assess his LV function by echo and plan on doing a Lexiscan Cardiolite.  - Stress Test With Myocardial Perfusion One Day; Future  - Adult Transthoracic Echo Complete W/ Cont if Necessary Per Protocol  - Holter Monitor - 72 Hour Up To 21 Days; Future    2. Palpitations  The palpitations are somewhat bothersome so we will assess this with a Zile patch along with an echo.- Stress Test With Myocardial Perfusion One Day; Future  - Adult Transthoracic Echo Complete W/ Cont if Necessary Per Protocol  - Holter Monitor - 72 Hour Up To 21  Days; Future    3. Other chest pain  This is atypical for angina but given his multiple risk factors and the dyspnea on exertion we will plan a Lexiscan Cardiolite.  He is on aspirin therapy along with pravastatin.  - Stress Test With Myocardial Perfusion One Day; Future  - Adult Transthoracic Echo Complete W/ Cont if Necessary Per Protocol  - Holter Monitor - 72 Hour Up To 21 Days; Future    4. Benign essential HTN  Under good control.    5. Mixed hyperlipidemia  On medication.    6. Bilateral carotid artery stenosis  *Follow-up by vascular surgery.    7. Type 2 diabetes mellitus with hypoglycemia without coma, without long-term current use of insulin (CMS/[facility])  Good control by primary care physician.        Return in about 4 months (around 10/1/2020).

## 2020-06-22 ENCOUNTER — TELEPHONE (OUTPATIENT)
Dept: CARDIOLOGY | Facility: CLINIC | Age: 65
End: 2020-06-22

## 2020-06-22 RX ORDER — METOPROLOL SUCCINATE 25 MG/1
25 TABLET, EXTENDED RELEASE ORAL DAILY
Qty: 90 TABLET | Refills: 3 | Status: SHIPPED | OUTPATIENT
Start: 2020-06-22

## 2020-06-22 NOTE — TELEPHONE ENCOUNTER
Called and let patient know about the medication changes and the instructions about holding it Wednesday     He voiced understanding       ----- Message from Davy Rowan MD sent at 6/22/2020 11:51 AM CDT -----  Regarding: Holter  Let him know his Holter had some abnormal beats.  I would complete to start him on Toprol 25 mg daily if he is agreeable.  We will just need to hold that for his treadmill test Wednesday  ----- Message -----  From: Davy Rowan MD  Sent: 6/22/2020  11:47 AM CDT  To: Davy Rowan MD

## 2020-06-24 ENCOUNTER — HOSPITAL ENCOUNTER (OUTPATIENT)
Dept: NUCLEAR MEDICINE | Facility: HOSPITAL | Age: 65
Discharge: HOME | End: 2020-06-24

## 2020-06-24 ENCOUNTER — HOSPITAL ENCOUNTER (OUTPATIENT)
Dept: CARDIOLOGY | Facility: HOSPITAL | Age: 65
Discharge: HOME | End: 2020-06-24

## 2020-06-24 DIAGNOSIS — R00.2 PALPITATIONS: ICD-10-CM

## 2020-06-24 DIAGNOSIS — R06.09 DYSPNEA ON EXERTION: ICD-10-CM

## 2020-06-24 DIAGNOSIS — R07.89 OTHER CHEST PAIN: ICD-10-CM

## 2020-06-24 LAB
BH CV ECHO MEAS - ACS: 2 CM
BH CV ECHO MEAS - AI DEC SLOPE: 126 CM/SEC^2
BH CV ECHO MEAS - AI MAX PG: 36.2 MMHG
BH CV ECHO MEAS - AI MAX VEL: 301 CM/SEC
BH CV ECHO MEAS - AI P1/2T: 699.7 MSEC
BH CV ECHO MEAS - AO MAX PG: 6.6 MMHG
BH CV ECHO MEAS - AO MEAN PG: 3 MMHG
BH CV ECHO MEAS - AO ROOT AREA (BSA CORRECTED): 1.6
BH CV ECHO MEAS - AO ROOT AREA: 9.1 CM^2
BH CV ECHO MEAS - AO ROOT DIAM: 3.4 CM
BH CV ECHO MEAS - AO V2 MAX: 128 CM/SEC
BH CV ECHO MEAS - AO V2 MEAN: 83.8 CM/SEC
BH CV ECHO MEAS - AO V2 VTI: 24.1 CM
BH CV ECHO MEAS - ASC AORTA: 3.8 CM
BH CV ECHO MEAS - BSA(HAYCOCK): 2.1 M^2
BH CV ECHO MEAS - BSA: 2.1 M^2
BH CV ECHO MEAS - BZI_BMI: 25.6 KILOGRAMS/M^2
BH CV ECHO MEAS - BZI_METRIC_HEIGHT: 182.9 CM
BH CV ECHO MEAS - BZI_METRIC_WEIGHT: 85.7 KG
BH CV ECHO MEAS - EDV(CUBED): 76.8 ML
BH CV ECHO MEAS - EDV(MOD-SP2): 75.3 ML
BH CV ECHO MEAS - EDV(MOD-SP4): 82 ML
BH CV ECHO MEAS - EDV(TEICH): 80.8 ML
BH CV ECHO MEAS - EF(CUBED): 56.1 %
BH CV ECHO MEAS - EF(MOD-SP2): 48.7 %
BH CV ECHO MEAS - EF(MOD-SP4): 56.8 %
BH CV ECHO MEAS - EF(TEICH): 48.1 %
BH CV ECHO MEAS - EPSS: 0.3 CM
BH CV ECHO MEAS - ESV(CUBED): 33.7 ML
BH CV ECHO MEAS - ESV(MOD-SP2): 38.6 ML
BH CV ECHO MEAS - ESV(MOD-SP4): 35.4 ML
BH CV ECHO MEAS - ESV(TEICH): 41.9 ML
BH CV ECHO MEAS - FS: 24 %
BH CV ECHO MEAS - IVS/LVPW: 1.2
BH CV ECHO MEAS - IVSD: 1.4 CM
BH CV ECHO MEAS - LA DIMENSION: 3.9 CM
BH CV ECHO MEAS - LA/AO: 1.1
BH CV ECHO MEAS - LV DIASTOLIC VOL/BSA (35-75): 39.4 ML/M^2
BH CV ECHO MEAS - LV MASS(C)D: 199.5 GRAMS
BH CV ECHO MEAS - LV MASS(C)DI: 95.9 GRAMS/M^2
BH CV ECHO MEAS - LV SYSTOLIC VOL/BSA (12-30): 17 ML/M^2
BH CV ECHO MEAS - LVIDD: 4.3 CM
BH CV ECHO MEAS - LVIDS: 3.2 CM
BH CV ECHO MEAS - LVLD AP2: 8.3 CM
BH CV ECHO MEAS - LVLD AP4: 8.7 CM
BH CV ECHO MEAS - LVLS AP2: 7.3 CM
BH CV ECHO MEAS - LVLS AP4: 7.4 CM
BH CV ECHO MEAS - LVOT AREA (M): 3.5 CM^2
BH CV ECHO MEAS - LVOT AREA: 3.5 CM^2
BH CV ECHO MEAS - LVOT DIAM: 2.1 CM
BH CV ECHO MEAS - LVPWD: 1.2 CM
BH CV ECHO MEAS - MV A MAX VEL: 72.5 CM/SEC
BH CV ECHO MEAS - MV DEC SLOPE: 339 CM/SEC^2
BH CV ECHO MEAS - MV E MAX VEL: 56.4 CM/SEC
BH CV ECHO MEAS - MV E/A: 0.78
BH CV ECHO MEAS - MV MAX PG: 1.9 MMHG
BH CV ECHO MEAS - MV MEAN PG: 1 MMHG
BH CV ECHO MEAS - MV P1/2T MAX VEL: 69.5 CM/SEC
BH CV ECHO MEAS - MV P1/2T: 60 MSEC
BH CV ECHO MEAS - MV V2 MAX: 69 CM/SEC
BH CV ECHO MEAS - MV V2 MEAN: 40.1 CM/SEC
BH CV ECHO MEAS - MV V2 VTI: 20.9 CM
BH CV ECHO MEAS - MVA P1/2T LCG: 3.2 CM^2
BH CV ECHO MEAS - MVA(P1/2T): 3.7 CM^2
BH CV ECHO MEAS - PA MAX PG (FULL): 0.79 MMHG
BH CV ECHO MEAS - PA MAX PG: 3 MMHG
BH CV ECHO MEAS - PA MEAN PG (FULL): 1 MMHG
BH CV ECHO MEAS - PA MEAN PG: 2 MMHG
BH CV ECHO MEAS - PA V2 MAX: 86.1 CM/SEC
BH CV ECHO MEAS - PA V2 MEAN: 63.1 CM/SEC
BH CV ECHO MEAS - PA V2 VTI: 18.4 CM
BH CV ECHO MEAS - RAP SYSTOLE: 3 MMHG
BH CV ECHO MEAS - RV MAX PG: 2.2 MMHG
BH CV ECHO MEAS - RV MEAN PG: 1 MMHG
BH CV ECHO MEAS - RV V1 MAX: 73.8 CM/SEC
BH CV ECHO MEAS - RV V1 MEAN: 51.8 CM/SEC
BH CV ECHO MEAS - RV V1 VTI: 15.1 CM
BH CV ECHO MEAS - RVSP: 19.8 MMHG
BH CV ECHO MEAS - SI(AO): 105.2 ML/M^2
BH CV ECHO MEAS - SI(CUBED): 20.7 ML/M^2
BH CV ECHO MEAS - SI(MOD-SP2): 17.6 ML/M^2
BH CV ECHO MEAS - SI(MOD-SP4): 22.4 ML/M^2
BH CV ECHO MEAS - SI(TEICH): 18.7 ML/M^2
BH CV ECHO MEAS - SV(AO): 218.8 ML
BH CV ECHO MEAS - SV(CUBED): 43.1 ML
BH CV ECHO MEAS - SV(MOD-SP2): 36.7 ML
BH CV ECHO MEAS - SV(MOD-SP4): 46.6 ML
BH CV ECHO MEAS - SV(TEICH): 38.9 ML
BH CV ECHO MEAS - TR MAX VEL: 205 CM/SEC
BH CV STRESS BP STAGE 1: NORMAL
BH CV STRESS COMMENTS STAGE 1: NORMAL
BH CV STRESS DOSE REGADENOSON STAGE 1: 0.4
BH CV STRESS DURATION MIN STAGE 1: 0
BH CV STRESS DURATION SEC STAGE 1: 10
BH CV STRESS HR STAGE 1: 92
BH CV STRESS PROTOCOL 1: NORMAL
BH CV STRESS RECOVERY BP: NORMAL MMHG
BH CV STRESS RECOVERY HR: 84 BPM
BH CV STRESS STAGE 1: 1
BH CV VAS BP LEFT ARM: NORMAL MMHG
LV EF NUC BP: 61 %
MAXIMAL PREDICTED HEART RATE: 156 BPM
PERCENT MAX PREDICTED HR: 60.9 %
STRESS BASELINE BP: NORMAL MMHG
STRESS BASELINE HR: 60 BPM
STRESS PERCENT HR: 72 %
STRESS POST ESTIMATED WORKLOAD: 1 METS
STRESS POST PEAK BP: NORMAL MMHG
STRESS POST PEAK HR: 95 BPM
STRESS TARGET HR: 133 BPM

## 2020-06-24 PROCEDURE — 93016 CV STRESS TEST SUPVJ ONLY: CPT | Performed by: INTERNAL MEDICINE

## 2020-06-24 PROCEDURE — 78452 HT MUSCLE IMAGE SPECT MULT: CPT

## 2020-06-24 PROCEDURE — 0 TECHNETIUM SESTAMIBI: Performed by: INTERNAL MEDICINE

## 2020-06-24 PROCEDURE — A9500 TC99M SESTAMIBI: HCPCS | Performed by: INTERNAL MEDICINE

## 2020-06-24 PROCEDURE — 78452 HT MUSCLE IMAGE SPECT MULT: CPT | Performed by: INTERNAL MEDICINE

## 2020-06-24 PROCEDURE — 25010000002 REGADENOSON 0.4 MG/5ML SOLUTION: Performed by: INTERNAL MEDICINE

## 2020-06-24 PROCEDURE — 93017 CV STRESS TEST TRACING ONLY: CPT

## 2020-06-24 PROCEDURE — 93018 CV STRESS TEST I&R ONLY: CPT | Performed by: INTERNAL MEDICINE

## 2020-06-24 RX ORDER — SODIUM CHLORIDE 0.9 % (FLUSH) 0.9 %
10 SYRINGE (ML) INJECTION ONCE
Status: COMPLETED | OUTPATIENT
Start: 2020-06-24 | End: 2020-06-24

## 2020-06-24 RX ADMIN — TECHNETIUM TC 99M SESTAMIBI 1 DOSE: 1 INJECTION INTRAVENOUS at 10:09

## 2020-06-24 RX ADMIN — SODIUM CHLORIDE, PRESERVATIVE FREE 10 ML: 5 INJECTION INTRAVENOUS at 10:08

## 2020-06-24 RX ADMIN — REGADENOSON 0.4 MG: 0.08 INJECTION, SOLUTION INTRAVENOUS at 10:08

## 2020-06-24 RX ADMIN — TECHNETIUM TC 99M SESTAMIBI 1 DOSE: 1 INJECTION INTRAVENOUS at 08:31

## 2020-06-26 ENCOUNTER — TELEPHONE (OUTPATIENT)
Dept: CARDIOLOGY | Facility: CLINIC | Age: 65
End: 2020-06-26

## 2020-06-26 NOTE — TELEPHONE ENCOUNTER
Left voice mail for patient       ----- Message from Davy Rowan MD sent at 6/26/2020  9:42 AM CDT -----  Regarding: Stress test  This is the jena we started the Toprol on the other day.  Let him know his stress test showed no significant ischemia and his echo was normal.  Just need to stay on his Toprol..  ----- Message -----  From: Davy Rowan MD  Sent: 6/24/2020  10:08 PM CDT  To: Davy Rowan MD

## 2021-06-24 ENCOUNTER — OFFICE VISIT (OUTPATIENT)
Dept: CARDIAC SURGERY | Facility: CLINIC | Age: 66
End: 2021-06-24

## 2021-06-24 VITALS
HEART RATE: 68 BPM | OXYGEN SATURATION: 97 % | TEMPERATURE: 97.7 F | BODY MASS INDEX: 27.71 KG/M2 | SYSTOLIC BLOOD PRESSURE: 124 MMHG | DIASTOLIC BLOOD PRESSURE: 78 MMHG | WEIGHT: 204.6 LBS | HEIGHT: 72 IN

## 2021-06-24 DIAGNOSIS — F43.10 PTSD (POST-TRAUMATIC STRESS DISORDER): ICD-10-CM

## 2021-06-24 DIAGNOSIS — F17.218 NICOTINE DEPENDENCE, CIGARETTES, WITH OTHER NICOTINE-INDUCED DISORDERS: ICD-10-CM

## 2021-06-24 DIAGNOSIS — E66.3 OVERWEIGHT WITH BODY MASS INDEX (BMI) OF 27 TO 27.9 IN ADULT: ICD-10-CM

## 2021-06-24 DIAGNOSIS — E78.2 MIXED HYPERLIPIDEMIA: ICD-10-CM

## 2021-06-24 DIAGNOSIS — E11.42 CONTROLLED TYPE 2 DIABETES MELLITUS WITH DIABETIC POLYNEUROPATHY, WITHOUT LONG-TERM CURRENT USE OF INSULIN (HCC): ICD-10-CM

## 2021-06-24 DIAGNOSIS — I10 BENIGN ESSENTIAL HTN: ICD-10-CM

## 2021-06-24 DIAGNOSIS — I73.9 PVD (PERIPHERAL VASCULAR DISEASE) (HCC): ICD-10-CM

## 2021-06-24 DIAGNOSIS — I65.23 BILATERAL CAROTID ARTERY STENOSIS: Primary | ICD-10-CM

## 2021-06-24 PROCEDURE — 99214 OFFICE O/P EST MOD 30 MIN: CPT | Performed by: THORACIC SURGERY (CARDIOTHORACIC VASCULAR SURGERY)

## 2021-06-24 RX ORDER — PREDNISONE 10 MG/1
10 TABLET ORAL 2 TIMES DAILY
COMMUNITY

## 2021-06-24 RX ORDER — LORATADINE 10 MG/1
CAPSULE, LIQUID FILLED ORAL
COMMUNITY

## 2021-07-20 NOTE — PROGRESS NOTES
6/24/2021    Jewel Billy  1955    Chief Complaint:    Chief Complaint   Patient presents with   • Carotid Artery Disease   • Peripheral Vascular Disease       HPI:      PCP:  Wayne Leyva DO  Cardiology:  Dr Rowan  Podiatry:  Dr Silverman     66y.o. male with HTN(stable, increased risk stroke, rupture), Hyperlipidemia(stable, increased risk cardiovascular events), Diabetes Mellitus(stable, increased risk cardiovascular events) and Smoker(uncontrolled, increased risk cardiovascular events), Carotid Stenosis(new, increased risk stroke).  smokes 1/2 PPD.  Rushing noises in head x 1 year.  Carotid bruits noted on exam.   Carotid stenosis noted on duplex imaging.  No TIA stroke amaurosis.  No MI claudication. No other associated signs, symptoms or modifying factors.     4/2018 Carotid Duplex:  AWILDA >70% (321cm/s, ratio 4.9) antegrade vert.  LICA 50-69% (221cm/s, ratio 1.9)  4/2018 CTA Carotids:  AWILDA 90%, LICA 80%  5/2018  RIGHT CEA  6/2018  LEFT CEA  8/2018 Carotid Duplex:  AWILDA 0-49% mPSV 114c/s  ratio 2.1  LICA 0-49%  mPSV 92c/s  ratio 1.0  4/2020 Carotid Duplex:  AWILDA 0-49% (114/41cm/s, ratio 1.6)  LICA 0-49%  (111/38cm/s, ratio 1.3)  6/2021 Carotid Duplex:  AWILDA 0-49% (122/24cm/s, ratio 1.2)  LICA 0-49%  (89/32cm/s, ratio 0.9)    3/2020 Lower extremity arterial duplex([facility]):  RIGHT triphasic CFA, biphasic SFA, triphasic popliteal, peroneal tri/biphasic, JL retrograde, PTA monophasic.  LEFT triphasic CFA, SFA, popliteal, JL retrograde, PTA peroneal mixed tri/bi/monophasic.  4/2020 SCOOBY:  RIGHT .98 triphasic.  LEFT 1.0 triphasic.     8/2017 ECG:  NSR 85, QTc 397    The following portions of the patient's history were reviewed and updated as appropriate: allergies, current medications, past family history, past medical history, past social history, past surgical history and problem list.  Recent images independently reviewed.  Available laboratory values reviewed.    PMH:  Past Medical History:    Diagnosis Date   • Acquired hypothyroidism    • Anxiety and depression    • Arthritis    • Coronary artery disease    • Diabetes mellitus (CMS/HCC)     on metformin rt to steriod use   • Full dentures    • Hashimoto's thyroiditis    • Head injury    • PTSD (post-traumatic stress disorder)    • Simple goiter    • Sleep apnea    • Wears glasses      Past Surgical History:   Procedure Laterality Date   • CAROTID ENDARTERECTOMY Right 5/23/2018    Procedure: RIGHT CAROTID ENDARTERECTOMY carotid cerebral arteriogram     (C-ARM#2);  Surgeon: Bayron Griffin MD;  Location: [facility] OR;  Service: Vascular   • CAROTID ENDARTERECTOMY Left 6/27/2018    Procedure: CAROTID ENDARTERECTOMY                          (C-ARM);  Surgeon: Bayron Griffin MD;  Location: [facility] OR;  Service: Vascular   • CARPAL TUNNEL RELEASE Bilateral    • COLON RESECTION     • SHOULDER ARTHROSCOPY Left    • VASECTOMY       Family History   Problem Relation Age of Onset   • Hypertension Other    • Thyroid disease Other      Social History     Tobacco Use   • Smoking status: Current Every Day Smoker     Packs/day: 0.50     Years: 50.00     Pack years: 25.00     Types: Cigarettes   • Smokeless tobacco: Never Used   • Tobacco comment: Ready to reduce cigarette consumption   Substance Use Topics   • Alcohol use: No   • Drug use: No       ALLERGIES:  Allergies   Allergen Reactions   • Vancomycin Unknown (See Comments)     Pt states had accident in 1980's and mother told him he had a reaction to this medication during that time         MEDICATIONS:    Current Outpatient Medications:   •  aspirin 81 MG EC tablet, Take 81 mg by mouth Daily., Disp: , Rfl:   •  candesartan (ATACAND) 32 MG tablet, Take 32 mg by mouth Daily., Disp: , Rfl:   •  cholecalciferol (VITAMIN D3) 1000 units tablet, Take 2,000 Units by mouth Daily., Disp: , Rfl:   •  CloNIDine (CATAPRES) 0.1 MG tablet, Take 0.1 mg by mouth Every 8 (Eight) Hours., Disp: , Rfl:   •  Dulaglutide  (Trulicity) 0.75 MG/0.5ML solution pen-injector, Trulicity 0.75 mg/0.5 mL subcutaneous pen injector  INJECT CONENTS OF 1 PEN ONCE WEEKLY, Disp: , Rfl:   •  famciclovir (FAMVIR) 500 MG tablet, Take 500 mg by mouth 3 (Three) Times a Week if Needed., Disp: , Rfl:   •  famotidine (PEPCID) 20 MG tablet, , Disp: , Rfl:   •  Glucose Blood (COOL BLOOD GLUCOSE TEST STRIPS VI), Every 8 (Eight) Hours., Disp: , Rfl:   •  levothyroxine (SYNTHROID, LEVOTHROID) 200 MCG tablet, Take 200 mcg by mouth Daily., Disp: , Rfl:   •  Loratadine (Claritin) 10 MG capsule, Take  by mouth., Disp: , Rfl:   •  metFORMIN (GLUCOPHAGE) 500 MG tablet, Take 500 mg by mouth 2 (Two) Times a Day With Meals., Disp: , Rfl:   •  methocarbamol (ROBAXIN) 750 MG tablet, Take 750 mg by mouth 2 (Two) Times a Day As Needed., Disp: , Rfl:   •  metoprolol succinate XL (TOPROL-XL) 25 MG 24 hr tablet, Take 1 tablet by mouth Daily., Disp: 90 tablet, Rfl: 3  •  pravastatin (PRAVACHOL) 40 MG tablet, Take 40 mg by mouth Daily., Disp: , Rfl:   •  predniSONE (DELTASONE) 10 MG tablet, Take 10 mg by mouth 2 (Two) Times a Day., Disp: , Rfl:     Review of Systems   Review of Systems   Constitutional: Positive for malaise/fatigue. Negative for weight loss.   Cardiovascular: Positive for claudication, irregular heartbeat and leg swelling. Negative for chest pain and dyspnea on exertion.   Respiratory: Positive for sleep disturbances due to breathing. Negative for cough and shortness of breath.    Hematologic/Lymphatic: Bruises/bleeds easily.   Skin: Negative for color change and poor wound healing.   Musculoskeletal: Positive for arthritis, back pain, muscle weakness and neck pain.   Neurological: Negative for dizziness, numbness and weakness.       Physical Exam   Vitals:    06/24/21 1325 06/24/21 1326   BP: 128/72 124/78   BP Location: Left arm Right arm   Pulse: 68    Temp: 97.7 °F (36.5 °C)    TempSrc: Infrared    SpO2: 97%    Weight: 92.8 kg (204 lb 9.6 oz)    Height: 182.9  "cm (72\")      Physical Exam  Constitutional:       General: He is not in acute distress.     Appearance: He is not ill-appearing.   HENT:      Right Ear: Hearing normal.      Left Ear: Hearing normal.      Nose: No nasal deformity.      Mouth/Throat:      Dentition: Normal dentition. Does not have dentures.   Cardiovascular:      Rate and Rhythm: Normal rate and regular rhythm.      Pulses:           Carotid pulses are 2+ on the right side and 2+ on the left side.       Radial pulses are 2+ on the right side and 2+ on the left side.        Posterior tibial pulses are 2+ on the right side and 2+ on the left side.      Heart sounds: No murmur heard.        Comments: Bilateral hammer toe deformity  Callus feet   Pulmonary:      Effort: Pulmonary effort is normal.      Breath sounds: Normal breath sounds.   Abdominal:      General: There is no distension.      Palpations: Abdomen is soft. There is no mass.      Tenderness: There is no abdominal tenderness.   Musculoskeletal:         General: No deformity.      Comments: Gait normal.    Skin:     General: Skin is warm and dry.      Coloration: Skin is not pale.      Findings: No erythema.      Comments: No venous staining   Neurological:      Mental Status: He is alert and oriented to person, place, and time.   Psychiatric:         Speech: Speech normal.         Behavior: Behavior is cooperative.         Thought Content: Thought content normal.         Judgment: Judgment normal.         BUN   Date Value Ref Range Status   06/27/2018 12 7 - 21 mg/dL Final     Creatinine   Date Value Ref Range Status   06/27/2018 0.92 0.70 - 1.30 mg/dL Final     eGFR Non  Amer   Date Value Ref Range Status   06/27/2018 83 49 - 113 mL/min/1.73 Final       ASSESSMENT:  Diagnoses and all orders for this visit:    1. Bilateral carotid artery stenosis (Primary)  -     Duplex Carotid Ultrasound CAR; Future    2. PVD (peripheral vascular disease) (CMS/[facility])    3. PTSD (post-traumatic stress " disorder)    4. Nicotine dependence, cigarettes, with other nicotine-induced disorders    5. Mixed hyperlipidemia    6. Controlled type 2 diabetes mellitus with diabetic polyneuropathy, without long-term current use of insulin (CMS/[facility])    7. Benign essential HTN    8. Overweight with body mass index (BMI) of 27 to 27.9 in adult    PLAN:  Detailed discussion with Jewel Billy regarding situation and options.  Stable carotid stenosis.  Multiple risk factors with severe comorbidities.  No intervention indicated at this time.  Will follow with interval imaging.  Risks, benefits discussed.  Understands and wishes to proceed with plan.    Return in 1 year with carotid duplex    Return after above studies complete  Obesity Class  0. Increased risk cardiovascular events, sleep and breathing disorders, joint issues, type 2 diabetes mellitus. Options for weight management, heart healthy diet, exercise programs, and associated health risks of obesity discussed.  Smoking cessation advised and assistance options offered including behavioral counseling ([employer]ess[facility]), Nicotine replacement therapy (patches or gum), pharmacologic therapy (Chantix, Wellbutrin). patient understands that continued use of tobacco products increases risk of heart disease, stroke, cancer; counseling for 3-5min.    Recommended regular physical activity, progressive walking program.  Continue current medications as directed.  Advance Care Planning   ACP discussion was declined by the patient. Patient does not have an advance directive, declines further assistance.    Thank you for the opportunity to participate in this patient's care.    Copy to primary care provider.

## 2023-06-11 ENCOUNTER — HOSPITAL ENCOUNTER (EMERGENCY)
Facility: HOSPITAL | Age: 68
Discharge: ANOTHER HOSPITAL | End: 2023-06-11
Attending: EMERGENCY MEDICINE | Admitting: EMERGENCY MEDICINE
Payer: MEDICARE

## 2023-06-11 ENCOUNTER — APPOINTMENT (OUTPATIENT)
Dept: CT IMAGING | Facility: HOSPITAL | Age: 68
End: 2023-06-11
Payer: MEDICARE

## 2023-06-11 VITALS
HEART RATE: 72 BPM | TEMPERATURE: 97.6 F | HEIGHT: 72 IN | WEIGHT: 185 LBS | RESPIRATION RATE: 17 BRPM | OXYGEN SATURATION: 99 % | DIASTOLIC BLOOD PRESSURE: 56 MMHG | BODY MASS INDEX: 25.06 KG/M2 | SYSTOLIC BLOOD PRESSURE: 99 MMHG

## 2023-06-11 DIAGNOSIS — K92.2 GASTROINTESTINAL HEMORRHAGE, UNSPECIFIED GASTROINTESTINAL HEMORRHAGE TYPE: Primary | ICD-10-CM

## 2023-06-11 DIAGNOSIS — R10.32 LEFT LOWER QUADRANT ABDOMINAL PAIN: ICD-10-CM

## 2023-06-11 LAB
ABO GROUP BLD: NORMAL
ALBUMIN SERPL-MCNC: 4.4 G/DL (ref 3.5–5.2)
ALBUMIN/GLOB SERPL: 1.5 G/DL
ALP SERPL-CCNC: 42 U/L (ref 39–117)
ALT SERPL W P-5'-P-CCNC: 10 U/L (ref 1–41)
ANION GAP SERPL CALCULATED.3IONS-SCNC: 13 MMOL/L (ref 5–15)
APTT PPP: 22.5 SECONDS (ref 20–40.3)
AST SERPL-CCNC: 9 U/L (ref 1–40)
BASOPHILS # BLD AUTO: 0.03 10*3/MM3 (ref 0–0.2)
BASOPHILS NFR BLD AUTO: 0.2 % (ref 0–1.5)
BILIRUB SERPL-MCNC: 0.4 MG/DL (ref 0–1.2)
BLD GP AB SCN SERPL QL: NEGATIVE
BUN SERPL-MCNC: 23 MG/DL (ref 8–23)
BUN/CREAT SERPL: 22.5 (ref 7–25)
CALCIUM SPEC-SCNC: 10.1 MG/DL (ref 8.6–10.5)
CHLORIDE SERPL-SCNC: 96 MMOL/L (ref 98–107)
CO2 SERPL-SCNC: 23 MMOL/L (ref 22–29)
CREAT SERPL-MCNC: 1.02 MG/DL (ref 0.76–1.27)
DEPRECATED RDW RBC AUTO: 49.1 FL (ref 37–54)
DEPRECATED RDW RBC AUTO: 49.2 FL (ref 37–54)
EGFRCR SERPLBLD CKD-EPI 2021: 80.6 ML/MIN/1.73
EOSINOPHIL # BLD AUTO: 0.09 10*3/MM3 (ref 0–0.4)
EOSINOPHIL NFR BLD AUTO: 0.7 % (ref 0.3–6.2)
ERYTHROCYTE [DISTWIDTH] IN BLOOD BY AUTOMATED COUNT: 15.3 % (ref 12.3–15.4)
ERYTHROCYTE [DISTWIDTH] IN BLOOD BY AUTOMATED COUNT: 15.3 % (ref 12.3–15.4)
GLOBULIN UR ELPH-MCNC: 2.9 GM/DL
GLUCOSE SERPL-MCNC: 163 MG/DL (ref 65–99)
HCT VFR BLD AUTO: 27.6 % (ref 37.5–51)
HCT VFR BLD AUTO: 35.6 % (ref 37.5–51)
HGB BLD-MCNC: 12.3 G/DL (ref 13–17.7)
HGB BLD-MCNC: 9.6 G/DL (ref 13–17.7)
HOLD SPECIMEN: NORMAL
IMM GRANULOCYTES # BLD AUTO: 0.12 10*3/MM3 (ref 0–0.05)
IMM GRANULOCYTES NFR BLD AUTO: 0.9 % (ref 0–0.5)
INR PPP: 0.92 (ref 0.8–1.2)
LYMPHOCYTES # BLD AUTO: 4.12 10*3/MM3 (ref 0.7–3.1)
LYMPHOCYTES NFR BLD AUTO: 30.1 % (ref 19.6–45.3)
Lab: NORMAL
MCH RBC QN AUTO: 30.4 PG (ref 26.6–33)
MCH RBC QN AUTO: 30.8 PG (ref 26.6–33)
MCHC RBC AUTO-ENTMCNC: 34.6 G/DL (ref 31.5–35.7)
MCHC RBC AUTO-ENTMCNC: 34.8 G/DL (ref 31.5–35.7)
MCV RBC AUTO: 87.9 FL (ref 79–97)
MCV RBC AUTO: 88.5 FL (ref 79–97)
MONOCYTES # BLD AUTO: 0.98 10*3/MM3 (ref 0.1–0.9)
MONOCYTES NFR BLD AUTO: 7.2 % (ref 5–12)
NEUTROPHILS NFR BLD AUTO: 60.9 % (ref 42.7–76)
NEUTROPHILS NFR BLD AUTO: 8.36 10*3/MM3 (ref 1.7–7)
NRBC BLD AUTO-RTO: 0 /100 WBC (ref 0–0.2)
PLATELET # BLD AUTO: 286 10*3/MM3 (ref 140–450)
PLATELET # BLD AUTO: 408 10*3/MM3 (ref 140–450)
PMV BLD AUTO: 9.1 FL (ref 6–12)
PMV BLD AUTO: 9.3 FL (ref 6–12)
POTASSIUM SERPL-SCNC: 4.7 MMOL/L (ref 3.5–5.2)
PROT SERPL-MCNC: 7.3 G/DL (ref 6–8.5)
PROTHROMBIN TIME: 12.4 SECONDS (ref 11.1–15.3)
RBC # BLD AUTO: 3.12 10*6/MM3 (ref 4.14–5.8)
RBC # BLD AUTO: 4.05 10*6/MM3 (ref 4.14–5.8)
RH BLD: NEGATIVE
SODIUM SERPL-SCNC: 132 MMOL/L (ref 136–145)
T&S EXPIRATION DATE: NORMAL
WBC NRBC COR # BLD: 13.7 10*3/MM3 (ref 3.4–10.8)
WBC NRBC COR # BLD: 14.43 10*3/MM3 (ref 3.4–10.8)

## 2023-06-11 PROCEDURE — 86850 RBC ANTIBODY SCREEN: CPT | Performed by: EMERGENCY MEDICINE

## 2023-06-11 PROCEDURE — 25510000001 IOPAMIDOL PER 1 ML: Performed by: EMERGENCY MEDICINE

## 2023-06-11 PROCEDURE — 85025 COMPLETE CBC W/AUTO DIFF WBC: CPT | Performed by: EMERGENCY MEDICINE

## 2023-06-11 PROCEDURE — 85027 COMPLETE CBC AUTOMATED: CPT | Performed by: EMERGENCY MEDICINE

## 2023-06-11 PROCEDURE — 96375 TX/PRO/DX INJ NEW DRUG ADDON: CPT

## 2023-06-11 PROCEDURE — P9016 RBC LEUKOCYTES REDUCED: HCPCS

## 2023-06-11 PROCEDURE — 74174 CTA ABD&PLVS W/CONTRAST: CPT

## 2023-06-11 PROCEDURE — 86900 BLOOD TYPING SEROLOGIC ABO: CPT | Performed by: EMERGENCY MEDICINE

## 2023-06-11 PROCEDURE — 36430 TRANSFUSION BLD/BLD COMPNT: CPT

## 2023-06-11 PROCEDURE — 99284 EMERGENCY DEPT VISIT MOD MDM: CPT

## 2023-06-11 PROCEDURE — 25010000002 ONDANSETRON PER 1 MG: Performed by: EMERGENCY MEDICINE

## 2023-06-11 PROCEDURE — 86900 BLOOD TYPING SEROLOGIC ABO: CPT

## 2023-06-11 PROCEDURE — 25010000002 FENTANYL CITRATE (PF) 50 MCG/ML SOLUTION: Performed by: EMERGENCY MEDICINE

## 2023-06-11 PROCEDURE — 85730 THROMBOPLASTIN TIME PARTIAL: CPT | Performed by: EMERGENCY MEDICINE

## 2023-06-11 PROCEDURE — 96374 THER/PROPH/DIAG INJ IV PUSH: CPT

## 2023-06-11 PROCEDURE — 85610 PROTHROMBIN TIME: CPT | Performed by: EMERGENCY MEDICINE

## 2023-06-11 PROCEDURE — 86923 COMPATIBILITY TEST ELECTRIC: CPT

## 2023-06-11 PROCEDURE — 80053 COMPREHEN METABOLIC PANEL: CPT | Performed by: EMERGENCY MEDICINE

## 2023-06-11 PROCEDURE — 86901 BLOOD TYPING SEROLOGIC RH(D): CPT | Performed by: EMERGENCY MEDICINE

## 2023-06-11 RX ORDER — PANTOPRAZOLE SODIUM 40 MG/10ML
40 INJECTION, POWDER, LYOPHILIZED, FOR SOLUTION INTRAVENOUS ONCE
Status: COMPLETED | OUTPATIENT
Start: 2023-06-11 | End: 2023-06-11

## 2023-06-11 RX ORDER — ONDANSETRON 2 MG/ML
4 INJECTION INTRAMUSCULAR; INTRAVENOUS ONCE
Status: COMPLETED | OUTPATIENT
Start: 2023-06-11 | End: 2023-06-11

## 2023-06-11 RX ORDER — LOSARTAN POTASSIUM 100 MG/1
100 TABLET ORAL DAILY
Status: ON HOLD | COMMUNITY
Start: 2023-04-10

## 2023-06-11 RX ORDER — SODIUM CHLORIDE 9 MG/ML
INJECTION, SOLUTION INTRAVENOUS
Status: COMPLETED
Start: 2023-06-11 | End: 2023-06-11

## 2023-06-11 RX ORDER — FENTANYL CITRATE 50 UG/ML
50 INJECTION, SOLUTION INTRAMUSCULAR; INTRAVENOUS ONCE
Status: COMPLETED | OUTPATIENT
Start: 2023-06-11 | End: 2023-06-11

## 2023-06-11 RX ADMIN — ONDANSETRON 4 MG: 2 INJECTION INTRAMUSCULAR; INTRAVENOUS at 02:35

## 2023-06-11 RX ADMIN — SODIUM CHLORIDE: 9 INJECTION, SOLUTION INTRAVENOUS at 06:30

## 2023-06-11 RX ADMIN — FENTANYL CITRATE 50 MCG: 50 INJECTION, SOLUTION INTRAMUSCULAR; INTRAVENOUS at 02:38

## 2023-06-11 RX ADMIN — PANTOPRAZOLE SODIUM 40 MG: 40 INJECTION, POWDER, FOR SOLUTION INTRAVENOUS at 02:55

## 2023-06-11 RX ADMIN — IOPAMIDOL 90 ML: 755 INJECTION, SOLUTION INTRAVENOUS at 04:09

## 2023-06-11 NOTE — ED PROVIDER NOTES
Subjective   History of Present Illness  This is a 68-year-old male who presents for evaluation of lower GI bleeding that is described as copious amounts of bright red blood that began after a bowel movement several hours ago and have been continuous with several repeated bloody bowel movements.  Denies abdominal pain.  Denies injury.  Denies nausea or vomiting.  Denies fevers or chills.  Despite the patient's medical history he denies any use of blood thinners including aspirin.  He has a prior history of colon resection and subsequent colonoscopy in which polyps were removed in the remote past.      Review of Systems   All other systems reviewed and are negative.    Past Medical History:   Diagnosis Date    Acquired hypothyroidism     Anxiety and depression     Arthritis     Coronary artery disease     Diabetes mellitus     on metformin rt to steriod use    Full dentures     Hashimoto's thyroiditis     Head injury     PTSD (post-traumatic stress disorder)     Simple goiter     Sleep apnea     Wears glasses        Allergies   Allergen Reactions    Vancomycin Unknown (See Comments)     Pt states had accident in 1980's and mother told him he had a reaction to this medication during that time       Past Surgical History:   Procedure Laterality Date    CAROTID ENDARTERECTOMY Right 5/23/2018    Procedure: RIGHT CAROTID ENDARTERECTOMY carotid cerebral arteriogram     (C-ARM#2);  Surgeon: Bayron Griffin MD;  Location: [facility] OR;  Service: Vascular    CAROTID ENDARTERECTOMY Left 6/27/2018    Procedure: CAROTID ENDARTERECTOMY                          (C-ARM);  Surgeon: Bayron Griffin MD;  Location: [facility] OR;  Service: Vascular    CARPAL TUNNEL RELEASE Bilateral     COLON RESECTION      SHOULDER ARTHROSCOPY Left     VASECTOMY         Family History   Problem Relation Age of Onset    Hypertension Other     Thyroid disease Other        Social History     Socioeconomic History    Marital status: Single  "  Tobacco Use    Smoking status: Every Day     Packs/day: 0.50     Years: 50.00     Pack years: 25.00     Types: Cigarettes    Smokeless tobacco: Never    Tobacco comments:     Ready to reduce cigarette consumption   Substance and Sexual Activity    Alcohol use: No    Drug use: Yes     Frequency: 2.0 times per week     Types: Marijuana    Sexual activity: Defer           Objective   Physical Exam  Vitals and nursing note reviewed.   Constitutional:       Appearance: He is not ill-appearing.   HENT:      Head: Normocephalic and atraumatic.      Right Ear: External ear normal.      Left Ear: External ear normal.      Mouth/Throat:      Mouth: Mucous membranes are moist.   Eyes:      General: No scleral icterus.  Cardiovascular:      Rate and Rhythm: Normal rate and regular rhythm.   Pulmonary:      Effort: Pulmonary effort is normal.      Breath sounds: Normal breath sounds.   Abdominal:      General: Abdomen is flat.      Tenderness: There is no abdominal tenderness.   Skin:     General: Skin is warm and dry.      Coloration: Skin is pale.   Neurological:      Mental Status: He is alert and oriented to person, place, and time.   Psychiatric:         Behavior: Behavior normal.       Procedures           ED Course  ED Course as of 06/11/23 0550   Sun Jun 11, 2023   0240 CBC & Differential(!)  Mild anemia and normal platelets.  Slight leukocytosis. [JV]   0337 CBC & Differential(!) [JV]      ED Course User Index  [JV] Scottie Brown, DO                                           Medical Decision Making  The patient's recent past medical charts for this facility as well as outside facilities via the \"care everywhere\" application of epic reviewed.    The differential diagnosis includes upper GI bleed, lower GI bleed, trauma, and anemia, among others.    After a subsequent bloody bowel movement in the emergency department the patient has developed left lower quadrant abdominal pain that he rates 8 out of 10.    Discussed " risks and benefits of blood transfusion with the patient who is agreeable to transfusion.    On final reevaluation the patient is in stable condition and awaiting transport to receiving facility.  Oncoming morning physician Dr. Thacker made aware of this patient's case at 630am.      Problems Addressed:  Gastrointestinal hemorrhage, unspecified gastrointestinal hemorrhage type: complicated acute illness or injury  Left lower quadrant abdominal pain: complicated acute illness or injury    Amount and/or Complexity of Data Reviewed  Labs: ordered. Decision-making details documented in ED Course.  Radiology: ordered.  Discussion of management or test interpretation with external provider(s): Case discussed with hospitalist on-call as well as GI on-call who feels that the patient requires embolization procedure.  We do not have IR on call and therefore they recommend transfer.    Case discussed with transfer receiving hospitalist who recommends transfusing a unit of blood during transport.    Risk  OTC drugs.  Prescription drug management.  Parenteral controlled substances.  Decision regarding hospitalization.        Final diagnoses:   Gastrointestinal hemorrhage, unspecified gastrointestinal hemorrhage type   Left lower quadrant abdominal pain       ED Disposition  ED Disposition       ED Disposition   Transfer to Another Facility     Condition   --    Comment   --               No follow-up provider specified.       Medication List      No changes were made to your prescriptions during this visit.            Scottie Brown,   06/11/23 0550

## 2023-06-11 NOTE — ED NOTES
"Nursing report ED to floor  Andreas Billy  68 y.o.  male    HPI:   Chief Complaint   Patient presents with    Black or Bloody Stool       Admitting doctor:   No admitting provider for patient encounter.    Consulting provider(s):  Consults       No orders found from 5/13/2023 to 6/12/2023.             Admitting diagnosis:   The primary encounter diagnosis was Gastrointestinal hemorrhage, unspecified gastrointestinal hemorrhage type. A diagnosis of Left lower quadrant abdominal pain was also pertinent to this visit.    Code status:   Current Code Status       Date Active Code Status Order ID Comments User Context       Prior            Allergies:   Vancomycin    Intake and Output  No intake or output data in the 24 hours ending 06/11/23 0441    Weight:       06/11/23 0147   Weight: 83.9 kg (185 lb)       Most recent vitals:   Vitals:    06/11/23 0147 06/11/23 0220 06/11/23 0240 06/11/23 0300   BP: 157/77 127/58 126/67 122/67   BP Location: Left arm Left arm  Left arm   Patient Position: Sitting Lying  Lying   Pulse: 97 88 76 74   Resp: 18 18  18   Temp: 97.5 °F (36.4 °C)      TempSrc: Oral      SpO2: 99% 100% 99% 99%   Weight: 83.9 kg (185 lb)      Height: 182.9 cm (72\")        Oxygen Therapy: Room Air    Active LDAs/IV Access:   Lines, Drains & Airways       Active LDAs       Name Placement date Placement time Site Days    Peripheral IV 06/11/23 0221 Anterior;Right Forearm 06/11/23 0221  Forearm  less than 1                    Labs (abnormal labs have a star):   Labs Reviewed   COMPREHENSIVE METABOLIC PANEL - Abnormal; Notable for the following components:       Result Value    Glucose 163 (*)     Sodium 132 (*)     Chloride 96 (*)     All other components within normal limits    Narrative:     GFR Normal >60  Chronic Kidney Disease <60  Kidney Failure <15     CBC WITH AUTO DIFFERENTIAL - Abnormal; Notable for the following components:    WBC 13.70 (*)     RBC 4.05 (*)     Hemoglobin 12.3 (*)     Hematocrit " 35.6 (*)     Immature Grans % 0.9 (*)     Neutrophils, Absolute 8.36 (*)     Lymphocytes, Absolute 4.12 (*)     Monocytes, Absolute 0.98 (*)     Immature Grans, Absolute 0.12 (*)     All other components within normal limits   CBC (NO DIFF) - Abnormal; Notable for the following components:    WBC 14.43 (*)     RBC 3.12 (*)     Hemoglobin 9.6 (*)     Hematocrit 27.6 (*)     All other components within normal limits   APTT - Normal    Narrative:     The recommended Heparin therapeutic range is 68-97 seconds.   PROTIME-INR - Normal    Narrative:     Therapeutic range for most indications is 2.0-3.0 INR,  or 2.5-3.5 for mechanical heart valves.   CBC AND DIFFERENTIAL    Narrative:     The following orders were created for panel order CBC & Differential.  Procedure                               Abnormality         Status                     ---------                               -----------         ------                     CBC Auto Differential[[id number]]        Abnormal            Final result                 Please view results for these tests on the individual orders.   EXTRA TUBES    Narrative:     The following orders were created for panel order Extra Tubes.  Procedure                               Abnormality         Status                     ---------                               -----------         ------                     Gold Top - SST[[id number]]                                   Final result                 Please view results for these tests on the individual orders.   GOLD TOP - SST       Meds given in ED:   Medications   pantoprazole (PROTONIX) injection 40 mg (40 mg Intravenous Given 6/11/23 0255)   fentaNYL citrate (PF) (SUBLIMAZE) injection 50 mcg (50 mcg Intravenous Given 6/11/23 0238)   ondansetron (ZOFRAN) injection 4 mg (4 mg Intravenous Given 6/11/23 0235)   iopamidol (ISOVUE-370) 76 % injection 100 mL (90 mL Intravenous Given 6/11/23 0959)     No current facility-administered medications  for this encounter.       NIH Stroke Scale:       Isolation/Infection(s):  No active isolations   No active infections     COVID Testing  Collected No  Resulted N/A    Nursing report ED to floor:  Mental status: Alert/Oriented x4  Ambulatory status: Assist x1  Precautions: none    ED nurse phone extentsion- [phone]

## 2023-06-12 LAB
BH BB BLOOD EXPIRATION DATE: NORMAL
BH BB BLOOD TYPE BARCODE: NORMAL
BH BB DISPENSE STATUS: NORMAL
BH BB PRODUCT CODE: NORMAL
BH BB UNIT NUMBER: NORMAL
CROSSMATCH INTERPRETATION: NORMAL
UNIT  ABO: NORMAL
UNIT  RH: NORMAL

## 2023-06-18 PROBLEM — K62.5 RECTAL BLEEDING: Status: ACTIVE | Noted: 2023-06-18

## 2023-06-19 ENCOUNTER — ANESTHESIA EVENT (OUTPATIENT)
Dept: GASTROENTEROLOGY | Facility: HOSPITAL | Age: 68
End: 2023-06-19
Payer: MEDICARE

## 2023-06-19 ENCOUNTER — ANESTHESIA (OUTPATIENT)
Dept: GASTROENTEROLOGY | Facility: HOSPITAL | Age: 68
End: 2023-06-19
Payer: MEDICARE

## 2023-06-19 PROCEDURE — 25010000002 PROPOFOL 10 MG/ML EMULSION: Performed by: NURSE ANESTHETIST, CERTIFIED REGISTERED

## 2023-06-19 RX ORDER — PROPOFOL 10 MG/ML
VIAL (ML) INTRAVENOUS AS NEEDED
Status: DISCONTINUED | OUTPATIENT
Start: 2023-06-19 | End: 2023-06-19 | Stop reason: SURG

## 2023-06-19 RX ORDER — LIDOCAINE HYDROCHLORIDE 20 MG/ML
INJECTION, SOLUTION INFILTRATION; PERINEURAL AS NEEDED
Status: DISCONTINUED | OUTPATIENT
Start: 2023-06-19 | End: 2023-06-19 | Stop reason: SURG

## 2023-06-19 RX ADMIN — PROPOFOL 20 MG: 10 INJECTION, EMULSION INTRAVENOUS at 12:39

## 2023-06-19 RX ADMIN — PROPOFOL 30 MG: 10 INJECTION, EMULSION INTRAVENOUS at 12:30

## 2023-06-19 RX ADMIN — PROPOFOL 30 MG: 10 INJECTION, EMULSION INTRAVENOUS at 12:33

## 2023-06-19 RX ADMIN — LIDOCAINE HYDROCHLORIDE 80 MG: 20 INJECTION, SOLUTION INFILTRATION; PERINEURAL at 12:24

## 2023-06-19 RX ADMIN — PROPOFOL 100 MG: 10 INJECTION, EMULSION INTRAVENOUS at 12:24

## 2023-06-19 RX ADMIN — PROPOFOL 20 MG: 10 INJECTION, EMULSION INTRAVENOUS at 12:36

## 2023-06-19 RX ADMIN — PROPOFOL 40 MG: 10 INJECTION, EMULSION INTRAVENOUS at 12:27

## 2023-06-19 NOTE — ANESTHESIA PREPROCEDURE EVALUATION
Anesthesia Evaluation     Patient summary reviewed   NPO Solid Status: > 8 hours  NPO Liquid Status: > 8 hours           Airway   Mallampati: II  TM distance: >3 FB  Neck ROM: full  No difficulty expected  Dental    (+) upper dentures    Pulmonary     breath sounds clear to auscultation  (+) a smoker Former, COPD mild, sleep apnea,   Cardiovascular   Exercise tolerance: good (4-7 METS)    NYHA Classification: II  ECG reviewed  PT is on anticoagulation therapy  Rhythm: regular  Rate: normal    (+) hypertension poorly controlled, CAD, PVD, hyperlipidemia,  carotid artery disease right carotid      Neuro/Psych  (+) psychiatric history Depression,    GI/Hepatic/Renal/Endo    (+)   diabetes mellitus type 2,     Musculoskeletal     Abdominal     Abdomen: soft.   Substance History      OB/GYN          Other   arthritis,                        Anesthesia Plan    ASA 3     general   total IV anesthesia  intravenous induction     Anesthetic plan, risks, benefits, and alternatives have been provided, discussed and informed consent has been obtained with: patient.    Plan discussed with CRNA.

## 2023-06-19 NOTE — ANESTHESIA POSTPROCEDURE EVALUATION
Patient: Andreas Billy    Procedure Summary     Date: 06/19/23 Room / Location: [facility] ENDOSCOPY 1 / [facility] ENDOSCOPY    Anesthesia Start: 1221 Anesthesia Stop: 1242    Procedures:       ESOPHAGOGASTRODUODENOSCOPY      COLONOSCOPY Diagnosis:       Rectal bleeding      (Rectal bleeding [K62.5])    Surgeons: Emery Estrada MD Provider: Jason Olvera CRNA    Anesthesia Type: general ASA Status: 3          Anesthesia Type: general    Vitals  No vitals data found for the desired time range.          Post Anesthesia Care and Evaluation    Patient location during evaluation: bedside  Patient participation: complete - patient participated  Level of consciousness: sleepy but conscious  Pain score: 0  Pain management: adequate    Airway patency: patent  Anesthetic complications: No anesthetic complications  PONV Status: none  Cardiovascular status: acceptable  Respiratory status: acceptable  Hydration status: acceptable    Comments: -------------------------              06/19/23                    1242        -------------------------   BP:         110/81        Pulse:        77          Resp:         18          Temp:   98 °F (36.7 °C)   SpO2:         99%        -------------------------

## 2023-07-27 ENCOUNTER — TRANSCRIBE ORDERS (OUTPATIENT)
Dept: PODIATRY | Facility: CLINIC | Age: 68
End: 2023-07-27
Payer: MEDICARE

## 2023-07-27 DIAGNOSIS — E11.9 ENCOUNTER FOR DIABETIC FOOT EXAM: Primary | ICD-10-CM

## 2023-07-27 DIAGNOSIS — E11.40 TYPE 2 DIABETES MELLITUS WITH DIABETIC NEUROPATHY, UNSPECIFIED WHETHER LONG TERM INSULIN USE: ICD-10-CM

## 2023-08-03 DIAGNOSIS — I65.23 CAROTID STENOSIS, ASYMPTOMATIC, BILATERAL: ICD-10-CM

## 2023-08-03 DIAGNOSIS — I73.9 PVD (PERIPHERAL VASCULAR DISEASE): Primary | ICD-10-CM

## 2023-08-10 ENCOUNTER — OFFICE VISIT (OUTPATIENT)
Dept: PODIATRY | Facility: CLINIC | Age: 68
End: 2023-08-10
Payer: MEDICARE

## 2023-08-10 VITALS
BODY MASS INDEX: 25.33 KG/M2 | HEART RATE: 83 BPM | WEIGHT: 187 LBS | OXYGEN SATURATION: 97 % | SYSTOLIC BLOOD PRESSURE: 166 MMHG | DIASTOLIC BLOOD PRESSURE: 100 MMHG | HEIGHT: 72 IN

## 2023-08-10 DIAGNOSIS — G89.29 CHRONIC PAIN OF LEFT ANKLE: ICD-10-CM

## 2023-08-10 DIAGNOSIS — E11.42 DIABETIC POLYNEUROPATHY ASSOCIATED WITH TYPE 2 DIABETES MELLITUS: Primary | ICD-10-CM

## 2023-08-10 DIAGNOSIS — M20.11 VALGUS DEFORMITY OF BOTH GREAT TOES: ICD-10-CM

## 2023-08-10 DIAGNOSIS — M25.572 CHRONIC PAIN OF LEFT ANKLE: ICD-10-CM

## 2023-08-10 DIAGNOSIS — M79.671 BILATERAL FOOT PAIN: ICD-10-CM

## 2023-08-10 DIAGNOSIS — E11.9 ENCOUNTER FOR DIABETIC FOOT EXAM: ICD-10-CM

## 2023-08-10 DIAGNOSIS — M79.672 BILATERAL FOOT PAIN: ICD-10-CM

## 2023-08-10 DIAGNOSIS — M20.12 VALGUS DEFORMITY OF BOTH GREAT TOES: ICD-10-CM

## 2023-08-10 NOTE — PROGRESS NOTES
Andreas Billy  1955  68 y.o. male  PCP: Talisha Francois 07/31/2023  BS: 265 per chart       08/10/2023    Chief Complaint   Patient presents with    Left Foot - Pain    Right Foot - Pain       History of Present Illness    Andreas Billy is a 68 y.o.male who presents to clinic today for diabetic foot care. He also states both of his feet are hurting into his shins. Pt also c/o left ankle pain > 6 months, denies any injury to this area.      Past Medical History:   Diagnosis Date    Abdominal pain     Acquired hypothyroidism     Anemia     Anxiety and depression     Arthritis     Coronary artery disease     Diabetes mellitus     Full dentures     Hashimoto's thyroiditis     Head injury     Hypertension     Peripheral neuropathy     PTSD (post-traumatic stress disorder)     Simple goiter     Sleep apnea     Wears glasses          Past Surgical History:   Procedure Laterality Date    CAROTID ENDARTERECTOMY Right 5/23/2018    Procedure: RIGHT CAROTID ENDARTERECTOMY carotid cerebral arteriogram     (C-ARM#2);  Surgeon: Bayron Griffin MD;  Location: [facility] OR;  Service: Vascular    CAROTID ENDARTERECTOMY Left 6/27/2018    Procedure: CAROTID ENDARTERECTOMY                          (C-ARM);  Surgeon: Bayron Griffin MD;  Location: [facility] OR;  Service: Vascular    CARPAL TUNNEL RELEASE Bilateral     COLON RESECTION      COLONOSCOPY N/A 6/19/2023    Procedure: COLONOSCOPY;  Surgeon: Emery Estrada MD;  Location: [facility] ENDOSCOPY;  Service: Gastroenterology;  Laterality: N/A;    ENDOSCOPY N/A 6/19/2023    Procedure: ESOPHAGOGASTRODUODENOSCOPY;  Surgeon: Emery Estrada MD;  Location: [facility] ENDOSCOPY;  Service: Gastroenterology;  Laterality: N/A;    SHOULDER ARTHROSCOPY Left     VASECTOMY           Family History   Problem Relation Age of Onset    Hypertension Other     Thyroid disease Other        Allergies   Allergen Reactions    Vancomycin Rash     Allergy first known in 1980       Social  "History     Socioeconomic History    Marital status: Single   Tobacco Use    Smoking status: Every Day     Packs/day: 0.50     Years: 50.00     Pack years: 25.00     Types: Cigarettes     Passive exposure: Past    Smokeless tobacco: Never    Tobacco comments:     Passive his whole life   Vaping Use    Vaping Use: Never used   Substance and Sexual Activity    Alcohol use: Not Currently     Comment: none since 2005    Drug use: Yes     Frequency: 2.0 times per week     Types: Marijuana    Sexual activity: Defer         Current Outpatient Medications   Medication Sig Dispense Refill    cholecalciferol (VITAMIN D3) 1000 units tablet Take 2 tablets by mouth Daily.      CloNIDine (CATAPRES) 0.1 MG tablet Take 1 tablet by mouth As Needed (high BP).      Dulaglutide 0.75 MG/0.5ML solution pen-injector Trulicity 0.75 mg/0.5 mL subcutaneous pen injector   INJECT CONENTS OF 1 PEN ONCE WEEKLY      famotidine (PEPCID) 40 MG tablet Take 1 tablet by mouth Daily.      Glucose Blood (COOL BLOOD GLUCOSE TEST STRIPS VI) Every 8 (Eight) Hours. (Patient not taking: Reported on 6/27/2023)      levothyroxine (SYNTHROID, LEVOTHROID) 200 MCG tablet Take 1 tablet by mouth Daily.      losartan (COZAAR) 100 MG tablet Take 1 tablet by mouth Daily.      metFORMIN (GLUCOPHAGE) 1000 MG tablet Take 1 tablet by mouth.      pravastatin (PRAVACHOL) 40 MG tablet       predniSONE (DELTASONE) 10 MG tablet Take 1 tablet by mouth 2 (Two) Times a Day.      valACYclovir (VALTREX) 500 MG tablet Take 1 tablet by mouth Daily.       No current facility-administered medications for this visit.       Review of Systems   Constitutional: Negative.    Respiratory: Negative.     Cardiovascular: Negative.    Musculoskeletal:         Foot/ankle pain   Skin: Negative.    Neurological: Negative.    Hematological: Negative.    Psychiatric/Behavioral: Negative.         OBJECTIVE    /100   Pulse 83   Ht 182.9 cm (72\")   Wt 84.8 kg (187 lb)   SpO2 97%   BMI 25.36 " kg/mý     Physical Exam  Vitals reviewed.   Constitutional:       General: He is not in acute distress.     Appearance: Normal appearance.   HENT:      Head: Normocephalic.   Eyes:      Pupils: Pupils are equal, round, and reactive to light.   Cardiovascular:      Pulses:           Dorsalis pedis pulses are detected w/ Doppler on the right side and detected w/ Doppler on the left side.        Posterior tibial pulses are detected w/ Doppler on the right side and detected w/ Doppler on the left side.   Pulmonary:      Effort: No respiratory distress.   Musculoskeletal:         General: Deformity present. No signs of injury.      Cervical back: Neck supple.      Left ankle: Tenderness present over the ATF ligament.      Right foot: Deformity and bunion present.      Left foot: Deformity and bunion present.   Feet:      Right foot:      Protective Sensation: 10 sites tested.  2 sites sensed.      Skin integrity: Dry skin present.      Left foot:      Protective Sensation: 10 sites tested.  2 sites sensed.      Skin integrity: Dry skin present.   Skin:     General: Skin is warm and dry.      Findings: No erythema.   Neurological:      General: No focal deficit present.      Mental Status: He is alert.   Psychiatric:         Mood and Affect: Mood normal.         Behavior: Behavior normal.            Procedures        ASSESSMENT AND PLAN    Diagnoses and all orders for this visit:    1. Diabetic polyneuropathy associated with type 2 diabetes mellitus (Primary)    2. Encounter for diabetic foot exam    3. Chronic pain of left ankle  -     XR Ankle 3+ View Left    4. Valgus deformity of both great toes  -     XR Foot 3+ View Bilateral    5. Bilateral foot pain  -     XR Foot 3+ View Bilateral        - A diabetic foot screening exam was performed and the patient was educated on the foot complications related to diabetes,  preventative foot care and what signs and symptoms to watch for.  Instructed to contact our office if any  foot problems develop before next visit.  - All the patients questions were answered.  - RTC 3 months or sooner if needed for this complaint.    -X-ray ordered to further evaluate chronic left ankle pain and valgus deformity of bilateral toes.  Recommending supportive foot gear and inserts.  Prescription for custom orthotics sent to physical therapy.  -Follow-up in 2 weeks for x-ray results.            This document has been electronically signed by VY Lara on August 10, 2023 15:22 CDT

## 2023-08-17 ENCOUNTER — OFFICE VISIT (OUTPATIENT)
Dept: CARDIAC SURGERY | Facility: CLINIC | Age: 68
End: 2023-08-17
Payer: MEDICARE

## 2023-08-17 VITALS
SYSTOLIC BLOOD PRESSURE: 142 MMHG | WEIGHT: 184.4 LBS | BODY MASS INDEX: 24.98 KG/M2 | OXYGEN SATURATION: 97 % | HEIGHT: 72 IN | TEMPERATURE: 98.7 F | HEART RATE: 81 BPM | DIASTOLIC BLOOD PRESSURE: 74 MMHG

## 2023-08-17 DIAGNOSIS — I73.9 PVD (PERIPHERAL VASCULAR DISEASE): Primary | ICD-10-CM

## 2023-08-17 DIAGNOSIS — E11.42 CONTROLLED TYPE 2 DIABETES MELLITUS WITH DIABETIC POLYNEUROPATHY, WITHOUT LONG-TERM CURRENT USE OF INSULIN: ICD-10-CM

## 2023-08-17 DIAGNOSIS — I70.213 ATHEROSCLEROSIS OF NATIVE ARTERIES OF EXTREMITIES WITH INTERMITTENT CLAUDICATION, BILATERAL LEGS: ICD-10-CM

## 2023-08-17 DIAGNOSIS — E78.2 MIXED HYPERLIPIDEMIA: ICD-10-CM

## 2023-08-17 DIAGNOSIS — I65.23 BILATERAL CAROTID ARTERY STENOSIS: ICD-10-CM

## 2023-08-17 DIAGNOSIS — E66.3 OVERWEIGHT WITH BODY MASS INDEX (BMI) OF 25 TO 25.9 IN ADULT: ICD-10-CM

## 2023-08-17 DIAGNOSIS — F43.10 PTSD (POST-TRAUMATIC STRESS DISORDER): ICD-10-CM

## 2023-08-17 DIAGNOSIS — I10 BENIGN ESSENTIAL HTN: ICD-10-CM

## 2023-08-17 DIAGNOSIS — F17.218 NICOTINE DEPENDENCE, CIGARETTES, WITH OTHER NICOTINE-INDUCED DISORDERS: ICD-10-CM

## 2023-08-17 NOTE — LETTER
August 18, 2023     VY Ray  [address] [facility] [address]    Patient: Andreas Billy   YOB: 1955   Date of Visit: 8/17/2023     Dear VY Ray:       Thank you for referring Andreas Billy to me for evaluation. Below are the relevant portions of my assessment and plan of care.    If you have questions, please do not hesitate to call me. I look forward to following Andreas along with you.         Sincerely,        Bayron Griffin MD        CC: SOHAIL Livingston APRN [place], Thomas Mark, MD  08/18/23 0759  Sign when Signing Visit  8/17/2023    Andreas Billy  1955    Chief Complaint:    Chief Complaint   Patient presents with    Peripheral Vascular Disease    Carotid Artery Disease       HPI:      PCP:  Talisha Francois APRN  Cardiology:  Dr Rowan  Podiatry:  Maricarmen Nicole     68y.o. male with HTN(stable, increased risk stroke, rupture), Hyperlipidemia(stable, increased risk cardiovascular events), Diabetes Mellitus(stable, increased risk cardiovascular events) and Smoker(uncontrolled, increased risk cardiovascular events), Carotid Stenosis(new, increased risk stroke).  smokes 1/2 PPD.  moderate burning feet, balance issues, ankle sensitivity x 2 years progressive.  Rushing noises in head x 1 year.  Carotid bruits noted on exam.   Carotid stenosis noted on duplex imaging.  No TIA stroke amaurosis.  No MI claudication. No other associated signs, symptoms or modifying factors.     4/2018 Carotid Duplex:  AWILDA >70% (321cm/s, ratio 4.9) antegrade vert.  LICA 50-69% (221cm/s, ratio 1.9) antegrade verts  4/2018 CTA Carotids:  AWILDA 90%, LICA 80%  5/2018  RIGHT CEA  6/2018  LEFT CEA  8/2018 Carotid Duplex:  AWILDA 0-49% mPSV 114c/s  ratio 2.1  LICA 0-49%  mPSV 92c/s  ratio 1.0 antegrade verts  4/2020 Carotid Duplex:  AWILDA 0-49% (114/41cm/s, ratio 1.6)  LICA 0-49%  (111/38cm/s, ratio 1.3) antegrade verts  6/2021  Carotid Duplex:  AWILDA 0-49% (122/24cm/s, ratio 1.2)  LICA 0-49%  (89/32cm/s, ratio 0.9) antegrade verts  8/2023 Carotid Duplex:  AWILDA 50-69% (173/54cm/s, ratio 1.2)  LICA <50%  (129/32cm/s, ratio 0.9) antegrade verts     3/2020 Lower extremity arterial duplex(JSH):  RIGHT triphasic CFA, biphasic SFA, triphasic popliteal, peroneal tri/biphasic, JL retrograde, PTA monophasic.  LEFT triphasic CFA, SFA, popliteal, JL retrograde, PTA peroneal mixed tri/bi/monophasic.  4/2020 SCOOBY:  RIGHT .98 triphasic.  LEFT 1.0 triphasic.  8/2023 SCOOBY:  RIGHT .68 tri/bi/monophasic.  LEFT .51 bi/monophasic.     8/2017 ECG:  NSR 85, QTc 397     The following portions of the patient's history were reviewed and updated as appropriate: allergies, current medications, past family history, past medical history, past social history, past surgical history and problem list.  Recent images independently reviewed.  Available laboratory values reviewed.    PMH:  Past Medical History:   Diagnosis Date    Abdominal pain     Acquired hypothyroidism     Anemia     Anxiety and depression     Arthritis     Coronary artery disease     Diabetes mellitus     Full dentures     Hashimoto's thyroiditis     Head injury     Hypertension     Peripheral neuropathy     PTSD (post-traumatic stress disorder)     Simple goiter     Sleep apnea     Wears glasses      Past Surgical History:   Procedure Laterality Date    CAROTID ENDARTERECTOMY Right 5/23/2018    Procedure: RIGHT CAROTID ENDARTERECTOMY carotid cerebral arteriogram     (C-ARM#2);  Surgeon: Bayron Griffin MD;  Location: [facility] OR;  Service: Vascular    CAROTID ENDARTERECTOMY Left 6/27/2018    Procedure: CAROTID ENDARTERECTOMY                          (C-ARM);  Surgeon: Bayron Griffin MD;  Location: [facility] OR;  Service: Vascular    CARPAL TUNNEL RELEASE Bilateral     COLON RESECTION      COLONOSCOPY N/A 6/19/2023    Procedure: COLONOSCOPY;  Surgeon: Emery Estrada MD;   Location: [facility] ENDOSCOPY;  Service: Gastroenterology;  Laterality: N/A;    ENDOSCOPY N/A 6/19/2023    Procedure: ESOPHAGOGASTRODUODENOSCOPY;  Surgeon: Emery Estrada MD;  Location: [facility] ENDOSCOPY;  Service: Gastroenterology;  Laterality: N/A;    SHOULDER ARTHROSCOPY Left     VASECTOMY       Family History   Problem Relation Age of Onset    Hypertension Other     Thyroid disease Other      Social History     Tobacco Use    Smoking status: Every Day     Packs/day: 0.50     Years: 50.00     Pack years: 25.00     Types: Cigarettes     Passive exposure: Past    Smokeless tobacco: Never    Tobacco comments:     Passive his whole life   Vaping Use    Vaping Use: Never used   Substance Use Topics    Alcohol use: Not Currently     Comment: none since 2005    Drug use: Yes     Frequency: 2.0 times per week     Types: Marijuana       ALLERGIES:  Allergies   Allergen Reactions    Vancomycin Rash     Allergy first known in 1980         MEDICATIONS:    Current Outpatient Medications:     cholecalciferol (VITAMIN D3) 1000 units tablet, Take 2 tablets by mouth Daily., Disp: , Rfl:     CloNIDine (CATAPRES) 0.1 MG tablet, Take 1 tablet by mouth As Needed (high BP)., Disp: , Rfl:     Dulaglutide 0.75 MG/0.5ML solution pen-injector, Trulicity 0.75 mg/0.5 mL subcutaneous pen injector  INJECT CONENTS OF 1 PEN ONCE WEEKLY, Disp: , Rfl:     famotidine (PEPCID) 40 MG tablet, Take 1 tablet by mouth Daily., Disp: , Rfl:     levothyroxine (SYNTHROID, LEVOTHROID) 200 MCG tablet, Take 1 tablet by mouth Daily., Disp: , Rfl:     losartan (COZAAR) 100 MG tablet, Take 1 tablet by mouth Daily., Disp: , Rfl:     pravastatin (PRAVACHOL) 40 MG tablet, , Disp: , Rfl:     predniSONE (DELTASONE) 10 MG tablet, Take 1 tablet by mouth 2 (Two) Times a Day., Disp: , Rfl:     valACYclovir (VALTREX) 500 MG tablet, Take 1 tablet by mouth Daily., Disp: , Rfl:     Glucose Blood (COOL BLOOD GLUCOSE TEST STRIPS VI), Every 8 (Eight) Hours.  "(Patient not taking: Reported on 6/27/2023), Disp: , Rfl:     metFORMIN (GLUCOPHAGE) 1000 MG tablet, Take 1 tablet by mouth. (Patient not taking: Reported on 8/17/2023), Disp: , Rfl:     Review of Systems   Review of Systems   Constitutional: Positive for malaise/fatigue. Negative for weight loss.   Cardiovascular:  Positive for claudication. Negative for chest pain and dyspnea on exertion.   Respiratory:  Negative for cough and shortness of breath.    Skin:  Negative for color change and poor wound healing.   Musculoskeletal:  Positive for arthritis, joint pain, muscle cramps and myalgias.   Neurological:  Positive for numbness and paresthesias. Negative for dizziness and weakness.     Physical Exam   Vitals:    08/17/23 1432   BP: 142/74   BP Location: Left arm   Pulse: 81   Temp: 98.7 øF (37.1 øC)   TempSrc: Infrared   SpO2: 97%   Weight: 83.6 kg (184 lb 6.4 oz)   Height: 182.9 cm (72\")     Body surface area is 2.06 meters squared.  Body mass index is 25.01 kg/mý.  Physical Exam  Constitutional:       General: He is not in acute distress.     Appearance: He is not ill-appearing.   HENT:      Right Ear: Hearing normal.      Left Ear: Hearing normal.      Nose: No nasal deformity.      Mouth/Throat:      Dentition: Normal dentition. Does not have dentures.   Cardiovascular:      Rate and Rhythm: Normal rate and regular rhythm.      Pulses:           Carotid pulses are 2+ on the right side and 2+ on the left side.       Radial pulses are 2+ on the right side and 2+ on the left side.        Dorsalis pedis pulses are 1+ on the right side and 0 on the left side.        Posterior tibial pulses are 1+ on the right side and 1+ on the left side.      Heart sounds: No murmur heard.     Comments: Bilateral foot deformity    Pulmonary:      Effort: Pulmonary effort is normal.      Breath sounds: Normal breath sounds.   Abdominal:      General: There is no distension.      Palpations: Abdomen is soft. There is no mass.      " Tenderness: There is no abdominal tenderness.   Musculoskeletal:         General: No deformity.      Comments: Gait normal.    Skin:     General: Skin is warm and dry.      Coloration: Skin is not pale.      Findings: No erythema.      Comments: No venous staining   Neurological:      Mental Status: He is alert and oriented to person, place, and time.   Psychiatric:         Speech: Speech normal.         Behavior: Behavior is cooperative.         Thought Content: Thought content normal.         Judgment: Judgment normal.       BUN   Date Value Ref Range Status   06/18/2023 15 8 - 23 mg/dL Final     Creatinine   Date Value Ref Range Status   06/18/2023 1.05 0.76 - 1.27 mg/dL Final     eGFR Non  Amer   Date Value Ref Range Status   06/27/2018 83 49 - 113 mL/min/1.73 Final       ASSESSMENT:  Diagnoses and all orders for this visit:    1. PVD (peripheral vascular disease) (Primary)  -     CT Angio Abdominal Aorta Bilateral Iliofem Runoff; Future    2. Atherosclerosis of native arteries of extremities with intermittent claudication, bilateral legs  -     CT Angio Abdominal Aorta Bilateral Iliofem Runoff; Future    3. Nicotine dependence, cigarettes, with other nicotine-induced disorders    4. Bilateral carotid artery stenosis    5. Benign essential HTN    6. Mixed hyperlipidemia    7. Controlled type 2 diabetes mellitus with diabetic polyneuropathy, without long-term current use of insulin    8. PTSD (post-traumatic stress disorder)    9. Overweight with body mass index (BMI) of 25 to 25.9 in adult    PLAN:  Detailed discussion with Andreas Billy regarding situation and options.  Progression PVD.  Multifactorial symptoms, PVD, neuropathy, feet deformity.  Multiple risk factors with severe comorbidities.  No intervention indicated at this time.  Will follow with interval imaging.  Risks, benefits discussed.  Understands and wishes to proceed with plan.    CTA Aorta runoff 9/7/2023    Return after above studies  complete  Obesity Class  0. Increased risk cardiovascular events, sleep and breathing disorders, joint issues, type 2 diabetes mellitus. Options for weight management, heart healthy diet, exercise programs, and associated health risks of obesity discussed.  Smoking cessation advised and assistance options offered including behavioral counseling ([facility] [facility]), Nicotine replacement therapy (patches or gum), pharmacologic therapy (Chantix, Wellbutrin). patient understands that continued use of tobacco products increases risk of heart disease, stroke, cancer; counseling for 3-5min.    Recommended regular physical activity, progressive walking program.  Continue current medications as directed.  Advance Care Planning   ACP discussion was declined by the patient. Patient does not have an advance directive, declines further assistance.     Thank you for the opportunity to participate in this patient's care.    Copy to primary care provider.

## 2023-08-18 PROBLEM — E66.3 OVERWEIGHT WITH BODY MASS INDEX (BMI) OF 25 TO 25.9 IN ADULT: Status: ACTIVE | Noted: 2023-08-18

## 2023-08-18 PROBLEM — I70.213 ATHEROSCLEROSIS OF NATIVE ARTERIES OF EXTREMITIES WITH INTERMITTENT CLAUDICATION, BILATERAL LEGS: Status: ACTIVE | Noted: 2023-08-18

## 2023-08-18 NOTE — PROGRESS NOTES
8/17/2023    Andreas Billy  1955    Chief Complaint:    Chief Complaint   Patient presents with    Peripheral Vascular Disease    Carotid Artery Disease       HPI:      PCP:  Talisha Francois APRN  Cardiology:  Dr Rowan  Podiatry:  Maricarmen Nicole APRTONI     68y.o. male with HTN(stable, increased risk stroke, rupture), Hyperlipidemia(stable, increased risk cardiovascular events), Diabetes Mellitus(stable, increased risk cardiovascular events) and Smoker(uncontrolled, increased risk cardiovascular events), Carotid Stenosis(new, increased risk stroke).  smokes 1/2 PPD.  moderate burning feet, balance issues, ankle sensitivity x 2 years progressive.  Rushing noises in head x 1 year.  Carotid bruits noted on exam.   Carotid stenosis noted on duplex imaging.  No TIA stroke amaurosis.  No MI claudication. No other associated signs, symptoms or modifying factors.     4/2018 Carotid Duplex:  AWILDA >70% (321cm/s, ratio 4.9) antegrade vert.  LICA 50-69% (221cm/s, ratio 1.9) antegrade verts  4/2018 CTA Carotids:  AWILDA 90%, LICA 80%  5/2018  RIGHT CEA  6/2018  LEFT CEA  8/2018 Carotid Duplex:  AWILDA 0-49% mPSV 114c/s  ratio 2.1  LICA 0-49%  mPSV 92c/s  ratio 1.0 antegrade verts  4/2020 Carotid Duplex:  AWILDA 0-49% (114/41cm/s, ratio 1.6)  LICA 0-49%  (111/38cm/s, ratio 1.3) antegrade verts  6/2021 Carotid Duplex:  AWILDA 0-49% (122/24cm/s, ratio 1.2)  LICA 0-49%  (89/32cm/s, ratio 0.9) antegrade verts  8/2023 Carotid Duplex:  AWILDA 50-69% (173/54cm/s, ratio 1.2)  LICA <50%  (129/32cm/s, ratio 0.9) antegrade verts     3/2020 Lower extremity arterial duplex(JSH):  RIGHT triphasic CFA, biphasic SFA, triphasic popliteal, peroneal tri/biphasic, JL retrograde, PTA monophasic.  LEFT triphasic CFA, SFA, popliteal, JL retrograde, PTA peroneal mixed tri/bi/monophasic.  4/2020 SCOOBY:  RIGHT .98 triphasic.  LEFT 1.0 triphasic.  8/2023 SCOOBY:  RIGHT .68 tri/bi/monophasic.  LEFT .51 bi/monophasic.     8/2017 ECG:  NSR 85, QTc 397      The following portions of the patient's history were reviewed and updated as appropriate: allergies, current medications, past family history, past medical history, past social history, past surgical history and problem list.  Recent images independently reviewed.  Available laboratory values reviewed.    PMH:  Past Medical History:   Diagnosis Date    Abdominal pain     Acquired hypothyroidism     Anemia     Anxiety and depression     Arthritis     Coronary artery disease     Diabetes mellitus     Full dentures     Hashimoto's thyroiditis     Head injury     Hypertension     Peripheral neuropathy     PTSD (post-traumatic stress disorder)     Simple goiter     Sleep apnea     Wears glasses      Past Surgical History:   Procedure Laterality Date    CAROTID ENDARTERECTOMY Right 5/23/2018    Procedure: RIGHT CAROTID ENDARTERECTOMY carotid cerebral arteriogram     (C-ARM#2);  Surgeon: Bayron Griffin MD;  Location: [facility] OR;  Service: Vascular    CAROTID ENDARTERECTOMY Left 6/27/2018    Procedure: CAROTID ENDARTERECTOMY                          (C-ARM);  Surgeon: Bayron Griffin MD;  Location: [facility] OR;  Service: Vascular    CARPAL TUNNEL RELEASE Bilateral     COLON RESECTION      COLONOSCOPY N/A 6/19/2023    Procedure: COLONOSCOPY;  Surgeon: Emery Estrada MD;  Location: [facility] ENDOSCOPY;  Service: Gastroenterology;  Laterality: N/A;    ENDOSCOPY N/A 6/19/2023    Procedure: ESOPHAGOGASTRODUODENOSCOPY;  Surgeon: Emery Estrada MD;  Location: [facility] ENDOSCOPY;  Service: Gastroenterology;  Laterality: N/A;    SHOULDER ARTHROSCOPY Left     VASECTOMY       Family History   Problem Relation Age of Onset    Hypertension Other     Thyroid disease Other      Social History     Tobacco Use    Smoking status: Every Day     Packs/day: 0.50     Years: 50.00     Pack years: 25.00     Types: Cigarettes     Passive exposure: Past    Smokeless tobacco: Never    Tobacco comments:     Passive his whole life    Vaping Use    Vaping Use: Never used   Substance Use Topics    Alcohol use: Not Currently     Comment: none since 2005    Drug use: Yes     Frequency: 2.0 times per week     Types: Marijuana       ALLERGIES:  Allergies   Allergen Reactions    Vancomycin Rash     Allergy first known in 1980         MEDICATIONS:    Current Outpatient Medications:     cholecalciferol (VITAMIN D3) 1000 units tablet, Take 2 tablets by mouth Daily., Disp: , Rfl:     CloNIDine (CATAPRES) 0.1 MG tablet, Take 1 tablet by mouth As Needed (high BP)., Disp: , Rfl:     Dulaglutide 0.75 MG/0.5ML solution pen-injector, Trulicity 0.75 mg/0.5 mL subcutaneous pen injector  INJECT CONENTS OF 1 PEN ONCE WEEKLY, Disp: , Rfl:     famotidine (PEPCID) 40 MG tablet, Take 1 tablet by mouth Daily., Disp: , Rfl:     levothyroxine (SYNTHROID, LEVOTHROID) 200 MCG tablet, Take 1 tablet by mouth Daily., Disp: , Rfl:     losartan (COZAAR) 100 MG tablet, Take 1 tablet by mouth Daily., Disp: , Rfl:     pravastatin (PRAVACHOL) 40 MG tablet, , Disp: , Rfl:     predniSONE (DELTASONE) 10 MG tablet, Take 1 tablet by mouth 2 (Two) Times a Day., Disp: , Rfl:     valACYclovir (VALTREX) 500 MG tablet, Take 1 tablet by mouth Daily., Disp: , Rfl:     Glucose Blood (COOL BLOOD GLUCOSE TEST STRIPS VI), Every 8 (Eight) Hours. (Patient not taking: Reported on 6/27/2023), Disp: , Rfl:     metFORMIN (GLUCOPHAGE) 1000 MG tablet, Take 1 tablet by mouth. (Patient not taking: Reported on 8/17/2023), Disp: , Rfl:     Review of Systems   Review of Systems   Constitutional: Positive for malaise/fatigue. Negative for weight loss.   Cardiovascular:  Positive for claudication. Negative for chest pain and dyspnea on exertion.   Respiratory:  Negative for cough and shortness of breath.    Skin:  Negative for color change and poor wound healing.   Musculoskeletal:  Positive for arthritis, joint pain, muscle cramps and myalgias.   Neurological:  Positive for numbness and paresthesias. Negative  "for dizziness and weakness.     Physical Exam   Vitals:    08/17/23 1432   BP: 142/74   BP Location: Left arm   Pulse: 81   Temp: 98.7 øF (37.1 øC)   TempSrc: Infrared   SpO2: 97%   Weight: 83.6 kg (184 lb 6.4 oz)   Height: 182.9 cm (72\")     Body surface area is 2.06 meters squared.  Body mass index is 25.01 kg/mý.  Physical Exam  Constitutional:       General: He is not in acute distress.     Appearance: He is not ill-appearing.   HENT:      Right Ear: Hearing normal.      Left Ear: Hearing normal.      Nose: No nasal deformity.      Mouth/Throat:      Dentition: Normal dentition. Does not have dentures.   Cardiovascular:      Rate and Rhythm: Normal rate and regular rhythm.      Pulses:           Carotid pulses are 2+ on the right side and 2+ on the left side.       Radial pulses are 2+ on the right side and 2+ on the left side.        Dorsalis pedis pulses are 1+ on the right side and 0 on the left side.        Posterior tibial pulses are 1+ on the right side and 1+ on the left side.      Heart sounds: No murmur heard.     Comments: Bilateral foot deformity    Pulmonary:      Effort: Pulmonary effort is normal.      Breath sounds: Normal breath sounds.   Abdominal:      General: There is no distension.      Palpations: Abdomen is soft. There is no mass.      Tenderness: There is no abdominal tenderness.   Musculoskeletal:         General: No deformity.      Comments: Gait normal.    Skin:     General: Skin is warm and dry.      Coloration: Skin is not pale.      Findings: No erythema.      Comments: No venous staining   Neurological:      Mental Status: He is alert and oriented to person, place, and time.   Psychiatric:         Speech: Speech normal.         Behavior: Behavior is cooperative.         Thought Content: Thought content normal.         Judgment: Judgment normal.       BUN   Date Value Ref Range Status   06/18/2023 15 8 - 23 mg/dL Final     Creatinine   Date Value Ref Range Status   06/18/2023 1.05 " 0.76 - 1.27 mg/dL Final     eGFR Non  Amer   Date Value Ref Range Status   06/27/2018 83 49 - 113 mL/min/1.73 Final       ASSESSMENT:  Diagnoses and all orders for this visit:    1. PVD (peripheral vascular disease) (Primary)  -     CT Angio Abdominal Aorta Bilateral Iliofem Runoff; Future    2. Atherosclerosis of native arteries of extremities with intermittent claudication, bilateral legs  -     CT Angio Abdominal Aorta Bilateral Iliofem Runoff; Future    3. Nicotine dependence, cigarettes, with other nicotine-induced disorders    4. Bilateral carotid artery stenosis    5. Benign essential HTN    6. Mixed hyperlipidemia    7. Controlled type 2 diabetes mellitus with diabetic polyneuropathy, without long-term current use of insulin    8. PTSD (post-traumatic stress disorder)    9. Overweight with body mass index (BMI) of 25 to 25.9 in adult    PLAN:  Detailed discussion with Andreas Billy regarding situation and options.  Progression PVD.  Multifactorial symptoms, PVD, neuropathy, feet deformity.  Multiple risk factors with severe comorbidities.  No intervention indicated at this time.  Will follow with interval imaging.  Risks, benefits discussed.  Understands and wishes to proceed with plan.    CTA Aorta runoff 9/7/2023    Return after above studies complete  Obesity Class  0. Increased risk cardiovascular events, sleep and breathing disorders, joint issues, type 2 diabetes mellitus. Options for weight management, heart healthy diet, exercise programs, and associated health risks of obesity discussed.  Smoking cessation advised and assistance options offered including behavioral counseling ([facility] Cessation [facility]), Nicotine replacement therapy (patches or gum), pharmacologic therapy (Chantix, Wellbutrin). patient understands that continued use of tobacco products increases risk of heart disease, stroke, cancer; counseling for 3-5min.    Recommended regular physical activity,  progressive walking program.  Continue current medications as directed.  Advance Care Planning   ACP discussion was declined by the patient. Patient does not have an advance directive, declines further assistance.     Thank you for the opportunity to participate in this patient's care.    Copy to primary care provider.

## 2023-09-01 ENCOUNTER — TRANSCRIBE ORDERS (OUTPATIENT)
Dept: CT IMAGING | Facility: HOSPITAL | Age: 68
End: 2023-09-01
Payer: MEDICARE

## 2023-09-01 DIAGNOSIS — I73.9 PVD (PERIPHERAL VASCULAR DISEASE): Primary | ICD-10-CM

## 2023-09-07 ENCOUNTER — LAB (OUTPATIENT)
Dept: LAB | Facility: HOSPITAL | Age: 68
End: 2023-09-07
Payer: MEDICARE

## 2023-09-07 ENCOUNTER — HOSPITAL ENCOUNTER (OUTPATIENT)
Dept: CT IMAGING | Facility: HOSPITAL | Age: 68
Discharge: HOME | End: 2023-09-07
Payer: MEDICARE

## 2023-09-07 DIAGNOSIS — I73.9 PVD (PERIPHERAL VASCULAR DISEASE): ICD-10-CM

## 2023-09-07 DIAGNOSIS — I70.213 ATHEROSCLEROSIS OF NATIVE ARTERIES OF EXTREMITIES WITH INTERMITTENT CLAUDICATION, BILATERAL LEGS: ICD-10-CM

## 2023-09-07 LAB
BUN SERPL-MCNC: 21 MG/DL (ref 8–23)
CREAT SERPL-MCNC: 1.09 MG/DL (ref 0.76–1.27)
EGFRCR SERPLBLD CKD-EPI 2021: 74.4 ML/MIN/1.73

## 2023-09-07 PROCEDURE — 25510000001 IOPAMIDOL PER 1 ML: Performed by: THORACIC SURGERY (CARDIOTHORACIC VASCULAR SURGERY)

## 2023-09-07 PROCEDURE — 84520 ASSAY OF UREA NITROGEN: CPT

## 2023-09-07 PROCEDURE — 36415 COLL VENOUS BLD VENIPUNCTURE: CPT

## 2023-09-07 PROCEDURE — 75635 CT ANGIO ABDOMINAL ARTERIES: CPT

## 2023-09-07 PROCEDURE — 82565 ASSAY OF CREATININE: CPT

## 2023-09-07 RX ORDER — SODIUM CHLORIDE 9 MG/ML
100 INJECTION, SOLUTION INTRAVENOUS
Status: DISCONTINUED | OUTPATIENT
Start: 2023-09-07 | End: 2023-09-08 | Stop reason: HOSPADM

## 2023-09-07 RX ADMIN — IOPAMIDOL 120 ML: 755 INJECTION, SOLUTION INTRAVENOUS at 11:33

## 2023-09-08 PROCEDURE — 99285 EMERGENCY DEPT VISIT HI MDM: CPT

## 2023-09-09 ENCOUNTER — APPOINTMENT (OUTPATIENT)
Dept: ULTRASOUND IMAGING | Facility: HOSPITAL | Age: 68
DRG: 638 | End: 2023-09-09
Payer: MEDICARE

## 2023-09-09 ENCOUNTER — ANESTHESIA EVENT (OUTPATIENT)
Dept: TELEMETRY | Facility: HOSPITAL | Age: 68
End: 2023-09-09

## 2023-09-09 ENCOUNTER — APPOINTMENT (OUTPATIENT)
Dept: MRI IMAGING | Facility: HOSPITAL | Age: 68
DRG: 638 | End: 2023-09-09
Payer: MEDICARE

## 2023-09-09 ENCOUNTER — APPOINTMENT (OUTPATIENT)
Dept: GENERAL RADIOLOGY | Facility: HOSPITAL | Age: 68
DRG: 638 | End: 2023-09-09
Payer: MEDICARE

## 2023-09-09 ENCOUNTER — HOSPITAL ENCOUNTER (INPATIENT)
Facility: HOSPITAL | Age: 68
LOS: 3 days | Discharge: HOME | DRG: 638 | End: 2023-09-12
Attending: STUDENT IN AN ORGANIZED HEALTH CARE EDUCATION/TRAINING PROGRAM | Admitting: INTERNAL MEDICINE
Payer: MEDICARE

## 2023-09-09 DIAGNOSIS — K62.5 RECTAL BLEEDING: ICD-10-CM

## 2023-09-09 DIAGNOSIS — I10 BENIGN ESSENTIAL HTN: ICD-10-CM

## 2023-09-09 DIAGNOSIS — I70.213 ATHEROSCLEROSIS OF NATIVE ARTERIES OF EXTREMITIES WITH INTERMITTENT CLAUDICATION, BILATERAL LEGS: ICD-10-CM

## 2023-09-09 DIAGNOSIS — E11.42 CONTROLLED TYPE 2 DIABETES MELLITUS WITH DIABETIC POLYNEUROPATHY, WITHOUT LONG-TERM CURRENT USE OF INSULIN: ICD-10-CM

## 2023-09-09 DIAGNOSIS — I73.9 PVD (PERIPHERAL VASCULAR DISEASE): ICD-10-CM

## 2023-09-09 DIAGNOSIS — I65.23 BILATERAL CAROTID ARTERY STENOSIS: ICD-10-CM

## 2023-09-09 DIAGNOSIS — F43.10 PTSD (POST-TRAUMATIC STRESS DISORDER): ICD-10-CM

## 2023-09-09 DIAGNOSIS — E66.3 OVERWEIGHT WITH BODY MASS INDEX (BMI) OF 25 TO 25.9 IN ADULT: ICD-10-CM

## 2023-09-09 DIAGNOSIS — E78.2 MIXED HYPERLIPIDEMIA: ICD-10-CM

## 2023-09-09 DIAGNOSIS — M00.9 SEPTIC ARTHRITIS OF RIGHT ANKLE, DUE TO UNSPECIFIED ORGANISM: Primary | ICD-10-CM

## 2023-09-09 DIAGNOSIS — F17.218 NICOTINE DEPENDENCE, CIGARETTES, WITH OTHER NICOTINE-INDUCED DISORDERS: ICD-10-CM

## 2023-09-09 LAB
ALBUMIN SERPL-MCNC: 4.3 G/DL (ref 3.5–5.2)
ALBUMIN/GLOB SERPL: 1.3 G/DL
ALP SERPL-CCNC: 64 U/L (ref 39–117)
ALT SERPL W P-5'-P-CCNC: 14 U/L (ref 1–41)
ANION GAP SERPL CALCULATED.3IONS-SCNC: 10 MMOL/L (ref 5–15)
ANION GAP SERPL CALCULATED.3IONS-SCNC: 13 MMOL/L (ref 5–15)
ANION GAP SERPL CALCULATED.3IONS-SCNC: 9 MMOL/L (ref 5–15)
ANION GAP SERPL CALCULATED.3IONS-SCNC: 9 MMOL/L (ref 5–15)
AST SERPL-CCNC: 14 U/L (ref 1–40)
BACTERIA UR QL AUTO: ABNORMAL /HPF
BASOPHILS # BLD AUTO: 0.03 10*3/MM3 (ref 0–0.2)
BASOPHILS NFR BLD AUTO: 0.2 % (ref 0–1.5)
BILIRUB SERPL-MCNC: 0.7 MG/DL (ref 0–1.2)
BILIRUB UR QL STRIP: NEGATIVE
BUN SERPL-MCNC: 14 MG/DL (ref 8–23)
BUN SERPL-MCNC: 15 MG/DL (ref 8–23)
BUN/CREAT SERPL: 13.5 (ref 7–25)
BUN/CREAT SERPL: 14 (ref 7–25)
BUN/CREAT SERPL: 14 (ref 7–25)
BUN/CREAT SERPL: 14.7 (ref 7–25)
CALCIUM SPEC-SCNC: 8.8 MG/DL (ref 8.6–10.5)
CALCIUM SPEC-SCNC: 8.9 MG/DL (ref 8.6–10.5)
CALCIUM SPEC-SCNC: 9 MG/DL (ref 8.6–10.5)
CALCIUM SPEC-SCNC: 9.2 MG/DL (ref 8.6–10.5)
CHLORIDE SERPL-SCNC: 92 MMOL/L (ref 98–107)
CHLORIDE SERPL-SCNC: 94 MMOL/L (ref 98–107)
CHLORIDE SERPL-SCNC: 96 MMOL/L (ref 98–107)
CHLORIDE SERPL-SCNC: 96 MMOL/L (ref 98–107)
CLARITY UR: CLEAR
CO2 SERPL-SCNC: 22 MMOL/L (ref 22–29)
CO2 SERPL-SCNC: 24 MMOL/L (ref 22–29)
CO2 SERPL-SCNC: 25 MMOL/L (ref 22–29)
CO2 SERPL-SCNC: 25 MMOL/L (ref 22–29)
COLOR UR: YELLOW
CREAT SERPL-MCNC: 1.02 MG/DL (ref 0.76–1.27)
CREAT SERPL-MCNC: 1.04 MG/DL (ref 0.76–1.27)
CREAT SERPL-MCNC: 1.07 MG/DL (ref 0.76–1.27)
CREAT SERPL-MCNC: 1.07 MG/DL (ref 0.76–1.27)
CRP SERPL-MCNC: 12.65 MG/DL (ref 0–0.5)
CRYSTALS FLD MICRO: NORMAL
DEPRECATED RDW RBC AUTO: 49.1 FL (ref 37–54)
EGFRCR SERPLBLD CKD-EPI 2021: 76.1 ML/MIN/1.73
EGFRCR SERPLBLD CKD-EPI 2021: 76.1 ML/MIN/1.73
EGFRCR SERPLBLD CKD-EPI 2021: 78.7 ML/MIN/1.73
EGFRCR SERPLBLD CKD-EPI 2021: 80.6 ML/MIN/1.73
EOSINOPHIL # BLD AUTO: 0.04 10*3/MM3 (ref 0–0.4)
EOSINOPHIL NFR BLD AUTO: 0.3 % (ref 0.3–6.2)
ERYTHROCYTE [DISTWIDTH] IN BLOOD BY AUTOMATED COUNT: 16.5 % (ref 12.3–15.4)
ERYTHROCYTE [SEDIMENTATION RATE] IN BLOOD: 52 MM/HR (ref 0–20)
GLOBULIN UR ELPH-MCNC: 3.3 GM/DL
GLUCOSE BLDC GLUCOMTR-MCNC: 179 MG/DL (ref 70–130)
GLUCOSE BLDC GLUCOMTR-MCNC: 221 MG/DL (ref 70–130)
GLUCOSE BLDC GLUCOMTR-MCNC: 264 MG/DL (ref 70–130)
GLUCOSE SERPL-MCNC: 139 MG/DL (ref 65–99)
GLUCOSE SERPL-MCNC: 174 MG/DL (ref 65–99)
GLUCOSE SERPL-MCNC: 227 MG/DL (ref 65–99)
GLUCOSE SERPL-MCNC: 266 MG/DL (ref 65–99)
GLUCOSE UR STRIP-MCNC: NEGATIVE MG/DL
HCT VFR BLD AUTO: 35.3 % (ref 37.5–51)
HGB BLD-MCNC: 11.6 G/DL (ref 13–17.7)
HGB UR QL STRIP.AUTO: ABNORMAL
HOLD SPECIMEN: NORMAL
HYALINE CASTS UR QL AUTO: ABNORMAL /LPF
IMM GRANULOCYTES # BLD AUTO: 0.07 10*3/MM3 (ref 0–0.05)
IMM GRANULOCYTES NFR BLD AUTO: 0.5 % (ref 0–0.5)
KETONES UR QL STRIP: NEGATIVE
KOH PREP NAIL: NORMAL
LEUKOCYTE ESTERASE UR QL STRIP.AUTO: NEGATIVE
LYMPHOCYTES # BLD AUTO: 1.83 10*3/MM3 (ref 0.7–3.1)
LYMPHOCYTES NFR BLD AUTO: 12.6 % (ref 19.6–45.3)
MCH RBC QN AUTO: 26.5 PG (ref 26.6–33)
MCHC RBC AUTO-ENTMCNC: 32.9 G/DL (ref 31.5–35.7)
MCV RBC AUTO: 80.8 FL (ref 79–97)
MONOCYTES # BLD AUTO: 1.36 10*3/MM3 (ref 0.1–0.9)
MONOCYTES NFR BLD AUTO: 9.4 % (ref 5–12)
NEUTROPHILS NFR BLD AUTO: 11.16 10*3/MM3 (ref 1.7–7)
NEUTROPHILS NFR BLD AUTO: 77 % (ref 42.7–76)
NITRITE UR QL STRIP: NEGATIVE
NRBC BLD AUTO-RTO: 0 /100 WBC (ref 0–0.2)
OSMOLALITY SERPL: 282 MOSM/KG (ref 280–301)
OSMOLALITY UR: 447 MOSM/KG (ref 38–1400)
PH UR STRIP.AUTO: 7 [PH] (ref 5–9)
PLATELET # BLD AUTO: 384 10*3/MM3 (ref 140–450)
PMV BLD AUTO: 8.9 FL (ref 6–12)
POTASSIUM SERPL-SCNC: 3.7 MMOL/L (ref 3.5–5.2)
POTASSIUM SERPL-SCNC: 3.8 MMOL/L (ref 3.5–5.2)
POTASSIUM SERPL-SCNC: 3.9 MMOL/L (ref 3.5–5.2)
POTASSIUM SERPL-SCNC: 4.3 MMOL/L (ref 3.5–5.2)
PROT SERPL-MCNC: 7.6 G/DL (ref 6–8.5)
PROT UR QL STRIP: ABNORMAL
RBC # BLD AUTO: 4.37 10*6/MM3 (ref 4.14–5.8)
RBC # UR STRIP: ABNORMAL /HPF
REF LAB TEST METHOD: ABNORMAL
SODIUM SERPL-SCNC: 127 MMOL/L (ref 136–145)
SODIUM SERPL-SCNC: 128 MMOL/L (ref 136–145)
SODIUM SERPL-SCNC: 129 MMOL/L (ref 136–145)
SODIUM SERPL-SCNC: 131 MMOL/L (ref 136–145)
SODIUM UR-SCNC: 61 MMOL/L
SP GR UR STRIP: 1.01 (ref 1–1.03)
SQUAMOUS #/AREA URNS HPF: ABNORMAL /HPF
T4 FREE SERPL-MCNC: 1.11 NG/DL (ref 0.93–1.7)
TSH SERPL DL<=0.05 MIU/L-ACNC: 12.27 UIU/ML (ref 0.27–4.2)
URATE SERPL-MCNC: 4.7 MG/DL (ref 3.4–7)
UROBILINOGEN UR QL STRIP: ABNORMAL
WBC # UR STRIP: ABNORMAL /HPF
WBC NRBC COR # BLD: 14.49 10*3/MM3 (ref 3.4–10.8)
WHOLE BLOOD HOLD COAG: NORMAL

## 2023-09-09 PROCEDURE — 84443 ASSAY THYROID STIM HORMONE: CPT | Performed by: INTERNAL MEDICINE

## 2023-09-09 PROCEDURE — 25010000002 MORPHINE PER 10 MG: Performed by: INTERNAL MEDICINE

## 2023-09-09 PROCEDURE — 25010000002 MORPHINE PER 10 MG: Performed by: NURSE PRACTITIONER

## 2023-09-09 PROCEDURE — 87070 CULTURE OTHR SPECIMN AEROBIC: CPT | Performed by: PODIATRIST

## 2023-09-09 PROCEDURE — 83930 ASSAY OF BLOOD OSMOLALITY: CPT | Performed by: INTERNAL MEDICINE

## 2023-09-09 PROCEDURE — 87205 SMEAR GRAM STAIN: CPT | Performed by: PODIATRIST

## 2023-09-09 PROCEDURE — 25010000002 ENOXAPARIN PER 10 MG: Performed by: INTERNAL MEDICINE

## 2023-09-09 PROCEDURE — 25010000002 LINEZOLID 600 MG/300ML SOLUTION: Performed by: INTERNAL MEDICINE

## 2023-09-09 PROCEDURE — 82948 REAGENT STRIP/BLOOD GLUCOSE: CPT

## 2023-09-09 PROCEDURE — 63710000001 INSULIN ASPART PER 5 UNITS: Performed by: NURSE PRACTITIONER

## 2023-09-09 PROCEDURE — 73610 X-RAY EXAM OF ANKLE: CPT

## 2023-09-09 PROCEDURE — 73630 X-RAY EXAM OF FOOT: CPT

## 2023-09-09 PROCEDURE — 84439 ASSAY OF FREE THYROXINE: CPT | Performed by: INTERNAL MEDICINE

## 2023-09-09 PROCEDURE — 87102 FUNGUS ISOLATION CULTURE: CPT | Performed by: PODIATRIST

## 2023-09-09 PROCEDURE — 86140 C-REACTIVE PROTEIN: CPT | Performed by: STUDENT IN AN ORGANIZED HEALTH CARE EDUCATION/TRAINING PROGRAM

## 2023-09-09 PROCEDURE — 63710000001 PREDNISONE PER 5 MG: Performed by: INTERNAL MEDICINE

## 2023-09-09 PROCEDURE — 73721 MRI JNT OF LWR EXTRE W/O DYE: CPT

## 2023-09-09 PROCEDURE — 85652 RBC SED RATE AUTOMATED: CPT | Performed by: STUDENT IN AN ORGANIZED HEALTH CARE EDUCATION/TRAINING PROGRAM

## 2023-09-09 PROCEDURE — 80053 COMPREHEN METABOLIC PANEL: CPT | Performed by: STUDENT IN AN ORGANIZED HEALTH CARE EDUCATION/TRAINING PROGRAM

## 2023-09-09 PROCEDURE — 63710000001 INSULIN ASPART PER 5 UNITS: Performed by: INTERNAL MEDICINE

## 2023-09-09 PROCEDURE — 36415 COLL VENOUS BLD VENIPUNCTURE: CPT | Performed by: INTERNAL MEDICINE

## 2023-09-09 PROCEDURE — 87220 TISSUE EXAM FOR FUNGI: CPT | Performed by: PODIATRIST

## 2023-09-09 PROCEDURE — 25010000002 CEFTRIAXONE PER 250 MG: Performed by: INTERNAL MEDICINE

## 2023-09-09 PROCEDURE — 89060 EXAM SYNOVIAL FLUID CRYSTALS: CPT | Performed by: PODIATRIST

## 2023-09-09 PROCEDURE — 81001 URINALYSIS AUTO W/SCOPE: CPT | Performed by: STUDENT IN AN ORGANIZED HEALTH CARE EDUCATION/TRAINING PROGRAM

## 2023-09-09 PROCEDURE — 84300 ASSAY OF URINE SODIUM: CPT | Performed by: INTERNAL MEDICINE

## 2023-09-09 PROCEDURE — 99232 SBSQ HOSP IP/OBS MODERATE 35: CPT | Performed by: PODIATRIST

## 2023-09-09 PROCEDURE — 25010000002 ONDANSETRON PER 1 MG: Performed by: NURSE PRACTITIONER

## 2023-09-09 PROCEDURE — 83935 ASSAY OF URINE OSMOLALITY: CPT | Performed by: INTERNAL MEDICINE

## 2023-09-09 PROCEDURE — 84550 ASSAY OF BLOOD/URIC ACID: CPT | Performed by: STUDENT IN AN ORGANIZED HEALTH CARE EDUCATION/TRAINING PROGRAM

## 2023-09-09 PROCEDURE — 93970 EXTREMITY STUDY: CPT

## 2023-09-09 PROCEDURE — 85025 COMPLETE CBC W/AUTO DIFF WBC: CPT | Performed by: STUDENT IN AN ORGANIZED HEALTH CARE EDUCATION/TRAINING PROGRAM

## 2023-09-09 PROCEDURE — 87040 BLOOD CULTURE FOR BACTERIA: CPT | Performed by: INTERNAL MEDICINE

## 2023-09-09 PROCEDURE — 87075 CULTR BACTERIA EXCEPT BLOOD: CPT | Performed by: PODIATRIST

## 2023-09-09 RX ORDER — NICOTINE POLACRILEX 4 MG
15 LOZENGE BUCCAL
Status: DISCONTINUED | OUTPATIENT
Start: 2023-09-09 | End: 2023-09-12 | Stop reason: HOSPADM

## 2023-09-09 RX ORDER — SODIUM CHLORIDE 0.9 % (FLUSH) 0.9 %
10 SYRINGE (ML) INJECTION AS NEEDED
Status: DISCONTINUED | OUTPATIENT
Start: 2023-09-09 | End: 2023-09-12 | Stop reason: HOSPADM

## 2023-09-09 RX ORDER — PRAVASTATIN SODIUM 20 MG
40 TABLET ORAL NIGHTLY
Status: DISCONTINUED | OUTPATIENT
Start: 2023-09-09 | End: 2023-09-12 | Stop reason: HOSPADM

## 2023-09-09 RX ORDER — DEXTROSE MONOHYDRATE 25 G/50ML
25 INJECTION, SOLUTION INTRAVENOUS
Status: DISCONTINUED | OUTPATIENT
Start: 2023-09-09 | End: 2023-09-12 | Stop reason: HOSPADM

## 2023-09-09 RX ORDER — PREDNISONE 10 MG/1
10 TABLET ORAL 2 TIMES DAILY
Status: DISCONTINUED | OUTPATIENT
Start: 2023-09-09 | End: 2023-09-12 | Stop reason: HOSPADM

## 2023-09-09 RX ORDER — SODIUM CHLORIDE 1 G/1
1 TABLET ORAL 2 TIMES DAILY
Status: DISCONTINUED | OUTPATIENT
Start: 2023-09-09 | End: 2023-09-12 | Stop reason: HOSPADM

## 2023-09-09 RX ORDER — PREGABALIN 75 MG/1
150 CAPSULE ORAL 2 TIMES DAILY
COMMUNITY

## 2023-09-09 RX ORDER — SODIUM CHLORIDE 9 MG/ML
INJECTION, SOLUTION INTRAVENOUS
Status: COMPLETED
Start: 2023-09-09 | End: 2023-09-09

## 2023-09-09 RX ORDER — ONDANSETRON 2 MG/ML
4 INJECTION INTRAMUSCULAR; INTRAVENOUS EVERY 6 HOURS PRN
Status: DISCONTINUED | OUTPATIENT
Start: 2023-09-09 | End: 2023-09-12 | Stop reason: HOSPADM

## 2023-09-09 RX ORDER — SODIUM CHLORIDE 1 G/1
1 TABLET ORAL ONCE
Status: COMPLETED | OUTPATIENT
Start: 2023-09-09 | End: 2023-09-09

## 2023-09-09 RX ORDER — INSULIN ASPART 100 [IU]/ML
0-7 INJECTION, SOLUTION INTRAVENOUS; SUBCUTANEOUS
Status: DISCONTINUED | OUTPATIENT
Start: 2023-09-09 | End: 2023-09-09

## 2023-09-09 RX ORDER — POLYETHYLENE GLYCOL 3350 17 G/17G
17 POWDER, FOR SOLUTION ORAL DAILY PRN
Status: DISCONTINUED | OUTPATIENT
Start: 2023-09-09 | End: 2023-09-12 | Stop reason: HOSPADM

## 2023-09-09 RX ORDER — HYDROCODONE BITARTRATE AND ACETAMINOPHEN 5; 325 MG/1; MG/1
1 TABLET ORAL ONCE
Status: COMPLETED | OUTPATIENT
Start: 2023-09-09 | End: 2023-09-09

## 2023-09-09 RX ORDER — FAMOTIDINE 40 MG/1
40 TABLET, FILM COATED ORAL DAILY
Status: DISCONTINUED | OUTPATIENT
Start: 2023-09-09 | End: 2023-09-12 | Stop reason: HOSPADM

## 2023-09-09 RX ORDER — LOSARTAN POTASSIUM 50 MG/1
100 TABLET ORAL DAILY
Status: DISCONTINUED | OUTPATIENT
Start: 2023-09-09 | End: 2023-09-12 | Stop reason: HOSPADM

## 2023-09-09 RX ORDER — MELATONIN
2000 DAILY
Status: DISCONTINUED | OUTPATIENT
Start: 2023-09-09 | End: 2023-09-12 | Stop reason: HOSPADM

## 2023-09-09 RX ORDER — ENOXAPARIN SODIUM 100 MG/ML
40 INJECTION SUBCUTANEOUS DAILY
Status: DISCONTINUED | OUTPATIENT
Start: 2023-09-09 | End: 2023-09-12 | Stop reason: HOSPADM

## 2023-09-09 RX ORDER — HYDROCODONE BITARTRATE AND ACETAMINOPHEN 5; 325 MG/1; MG/1
1 TABLET ORAL EVERY 6 HOURS PRN
Status: DISCONTINUED | OUTPATIENT
Start: 2023-09-09 | End: 2023-09-10

## 2023-09-09 RX ORDER — GLUCAGON 1 MG/ML
1 KIT INJECTION
Status: DISCONTINUED | OUTPATIENT
Start: 2023-09-09 | End: 2023-09-12 | Stop reason: HOSPADM

## 2023-09-09 RX ORDER — BISACODYL 10 MG
10 SUPPOSITORY, RECTAL RECTAL DAILY PRN
Status: DISCONTINUED | OUTPATIENT
Start: 2023-09-09 | End: 2023-09-12 | Stop reason: HOSPADM

## 2023-09-09 RX ORDER — SODIUM CHLORIDE 9 MG/ML
75 INJECTION, SOLUTION INTRAVENOUS CONTINUOUS
Status: DISCONTINUED | OUTPATIENT
Start: 2023-09-09 | End: 2023-09-09

## 2023-09-09 RX ORDER — LEVOTHYROXINE SODIUM 0.1 MG/1
200 TABLET ORAL DAILY
Status: DISCONTINUED | OUTPATIENT
Start: 2023-09-09 | End: 2023-09-12 | Stop reason: HOSPADM

## 2023-09-09 RX ORDER — NICOTINE 21 MG/24HR
1 PATCH, TRANSDERMAL 24 HOURS TRANSDERMAL
Status: DISCONTINUED | OUTPATIENT
Start: 2023-09-09 | End: 2023-09-12 | Stop reason: HOSPADM

## 2023-09-09 RX ORDER — SODIUM CHLORIDE 0.9 % (FLUSH) 0.9 %
10 SYRINGE (ML) INJECTION EVERY 12 HOURS SCHEDULED
Status: DISCONTINUED | OUTPATIENT
Start: 2023-09-09 | End: 2023-09-12 | Stop reason: HOSPADM

## 2023-09-09 RX ORDER — MORPHINE SULFATE 2 MG/ML
2 INJECTION, SOLUTION INTRAMUSCULAR; INTRAVENOUS EVERY 4 HOURS PRN
Status: DISCONTINUED | OUTPATIENT
Start: 2023-09-09 | End: 2023-09-09

## 2023-09-09 RX ORDER — INSULIN ASPART 100 [IU]/ML
0-9 INJECTION, SOLUTION INTRAVENOUS; SUBCUTANEOUS
Status: DISCONTINUED | OUTPATIENT
Start: 2023-09-09 | End: 2023-09-10

## 2023-09-09 RX ORDER — AMOXICILLIN 250 MG
2 CAPSULE ORAL 2 TIMES DAILY
Status: DISCONTINUED | OUTPATIENT
Start: 2023-09-09 | End: 2023-09-12 | Stop reason: HOSPADM

## 2023-09-09 RX ORDER — PREGABALIN 75 MG/1
150 CAPSULE ORAL 2 TIMES DAILY
Status: DISCONTINUED | OUTPATIENT
Start: 2023-09-09 | End: 2023-09-12 | Stop reason: HOSPADM

## 2023-09-09 RX ORDER — BISACODYL 5 MG/1
5 TABLET, DELAYED RELEASE ORAL DAILY PRN
Status: DISCONTINUED | OUTPATIENT
Start: 2023-09-09 | End: 2023-09-12 | Stop reason: HOSPADM

## 2023-09-09 RX ORDER — LINEZOLID 2 MG/ML
600 INJECTION, SOLUTION INTRAVENOUS EVERY 12 HOURS
Status: DISCONTINUED | OUTPATIENT
Start: 2023-09-09 | End: 2023-09-12 | Stop reason: HOSPADM

## 2023-09-09 RX ORDER — SODIUM CHLORIDE 9 MG/ML
40 INJECTION, SOLUTION INTRAVENOUS AS NEEDED
Status: DISCONTINUED | OUTPATIENT
Start: 2023-09-09 | End: 2023-09-12 | Stop reason: HOSPADM

## 2023-09-09 RX ADMIN — DOCUSATE SODIUM 50 MG AND SENNOSIDES 8.6 MG 2 TABLET: 8.6; 5 TABLET, FILM COATED ORAL at 08:39

## 2023-09-09 RX ADMIN — POLYETHYLENE GLYCOL 3350 17 G: 17 POWDER, FOR SOLUTION ORAL at 20:04

## 2023-09-09 RX ADMIN — LEVOTHYROXINE SODIUM 200 MCG: 100 TABLET ORAL at 08:41

## 2023-09-09 RX ADMIN — LINEZOLID 600 MG: 600 INJECTION, SOLUTION INTRAVENOUS at 09:29

## 2023-09-09 RX ADMIN — LOSARTAN POTASSIUM 100 MG: 50 TABLET, FILM COATED ORAL at 08:41

## 2023-09-09 RX ADMIN — Medication 2000 UNITS: at 08:40

## 2023-09-09 RX ADMIN — INSULIN ASPART 2 UNITS: 100 INJECTION, SOLUTION INTRAVENOUS; SUBCUTANEOUS at 08:42

## 2023-09-09 RX ADMIN — Medication 1 G: at 20:04

## 2023-09-09 RX ADMIN — Medication 1 G: at 08:40

## 2023-09-09 RX ADMIN — MORPHINE SULFATE 2 MG: 2 INJECTION, SOLUTION INTRAMUSCULAR; INTRAVENOUS at 10:03

## 2023-09-09 RX ADMIN — PREDNISONE 10 MG: 10 TABLET ORAL at 20:04

## 2023-09-09 RX ADMIN — ENOXAPARIN SODIUM 40 MG: 40 INJECTION SUBCUTANEOUS at 08:39

## 2023-09-09 RX ADMIN — SODIUM CHLORIDE: 9 INJECTION, SOLUTION INTRAVENOUS at 09:01

## 2023-09-09 RX ADMIN — Medication 1 G: at 14:12

## 2023-09-09 RX ADMIN — INSULIN ASPART 3 UNITS: 100 INJECTION, SOLUTION INTRAVENOUS; SUBCUTANEOUS at 10:52

## 2023-09-09 RX ADMIN — CEFTRIAXONE SODIUM 2000 MG: 2 INJECTION, POWDER, FOR SOLUTION INTRAMUSCULAR; INTRAVENOUS at 08:41

## 2023-09-09 RX ADMIN — PRAVASTATIN SODIUM 40 MG: 20 TABLET ORAL at 20:04

## 2023-09-09 RX ADMIN — PREGABALIN 150 MG: 75 CAPSULE ORAL at 20:04

## 2023-09-09 RX ADMIN — HYDROCODONE BITARTRATE AND ACETAMINOPHEN 1 TABLET: 5; 325 TABLET ORAL at 08:40

## 2023-09-09 RX ADMIN — MORPHINE SULFATE 4 MG: 4 INJECTION, SOLUTION INTRAMUSCULAR; INTRAVENOUS at 11:50

## 2023-09-09 RX ADMIN — PREGABALIN 150 MG: 75 CAPSULE ORAL at 08:39

## 2023-09-09 RX ADMIN — INSULIN ASPART 6 UNITS: 100 INJECTION, SOLUTION INTRAVENOUS; SUBCUTANEOUS at 17:35

## 2023-09-09 RX ADMIN — PREDNISONE 10 MG: 10 TABLET ORAL at 08:40

## 2023-09-09 RX ADMIN — HYDROCODONE BITARTRATE AND ACETAMINOPHEN 1 TABLET: 5; 325 TABLET ORAL at 01:55

## 2023-09-09 RX ADMIN — FAMOTIDINE 40 MG: 40 TABLET, FILM COATED ORAL at 08:40

## 2023-09-09 RX ADMIN — NICOTINE 1 PATCH: 21 PATCH, EXTENDED RELEASE TRANSDERMAL at 11:50

## 2023-09-09 RX ADMIN — LINEZOLID 600 MG: 600 INJECTION, SOLUTION INTRAVENOUS at 20:04

## 2023-09-09 RX ADMIN — MORPHINE SULFATE 4 MG: 4 INJECTION, SOLUTION INTRAMUSCULAR; INTRAVENOUS at 19:46

## 2023-09-09 RX ADMIN — Medication 10 ML: at 09:00

## 2023-09-09 RX ADMIN — Medication 10 ML: at 20:05

## 2023-09-09 RX ADMIN — LIDOCAINE HYDROCHLORIDE 10 ML: 10; .005 INJECTION, SOLUTION EPIDURAL; INFILTRATION; INTRACAUDAL; PERINEURAL at 07:20

## 2023-09-09 RX ADMIN — ONDANSETRON 4 MG: 2 INJECTION INTRAMUSCULAR; INTRAVENOUS at 09:22

## 2023-09-09 RX ADMIN — MORPHINE SULFATE 2 MG: 2 INJECTION, SOLUTION INTRAMUSCULAR; INTRAVENOUS at 05:55

## 2023-09-09 NOTE — ED NOTES
"Nursing report ED to floor  Andreas Billy  68 y.o.  male    HPI:   Chief Complaint   Patient presents with    Edema       Admitting doctor:   Saeid Behroozi, MD    Consulting provider(s):  Consults       Date and Time Order Name Status Description    9/9/2023  4:13 AM Foot / Ankle (on-call MD unless specified)               Admitting diagnosis:   The encounter diagnosis was Septic arthritis of right ankle, due to unspecified organism.    Code status:   Current Code Status       Date Active Code Status Order ID Comments User Context       9/9/2023 0424 CPR (Attempt to Resuscitate) [id number]  Behroozi, Saeid, MD ED        Question Answer    Code Status (Patient has no pulse and is not breathing) CPR (Attempt to Resuscitate)    Medical Interventions (Patient has pulse or is breathing) Full Support    Level Of Support Discussed With Patient                    Allergies:   Vancomycin    Intake and Output  No intake or output data in the 24 hours ending 09/09/23 0436    Weight:       09/09/23  0117   Weight: 83.9 kg (185 lb)       Most recent vitals:   Vitals:    09/08/23 2222 09/09/23 0117 09/09/23 0245 09/09/23 0330   BP: 127/68  148/70 132/63   BP Location: Right arm  Right arm    Patient Position: Sitting  Lying    Pulse: 85  78 72   Resp: 20  18    Temp: 98.4 °F (36.9 °C)      TempSrc: Oral      SpO2: 94%  93% 94%   Weight:  83.9 kg (185 lb)     Height:  182.9 cm (72\")       Oxygen Therapy: ra    Active LDAs/IV Access:   Lines, Drains & Airways       Active LDAs       None                    Labs (abnormal labs have a star):   Labs Reviewed   COMPREHENSIVE METABOLIC PANEL - Abnormal; Notable for the following components:       Result Value    Glucose 174 (*)     Sodium 127 (*)     Chloride 92 (*)     All other components within normal limits    Narrative:     GFR Normal >60  Chronic Kidney Disease <60  Kidney Failure <15     URINALYSIS W/ MICROSCOPIC IF INDICATED (NO CULTURE) - Abnormal; Notable for the " following components:    Blood, UA Trace (*)     Protein, UA >=300 mg/dL (3+) (*)     All other components within normal limits   CBC WITH AUTO DIFFERENTIAL - Abnormal; Notable for the following components:    WBC 14.49 (*)     Hemoglobin 11.6 (*)     Hematocrit 35.3 (*)     MCH 26.5 (*)     RDW 16.5 (*)     Neutrophil % 77.0 (*)     Lymphocyte % 12.6 (*)     Neutrophils, Absolute 11.16 (*)     Monocytes, Absolute 1.36 (*)     Immature Grans, Absolute 0.07 (*)     All other components within normal limits   URINALYSIS, MICROSCOPIC ONLY - Abnormal; Notable for the following components:    RBC, UA 3-5 (*)     All other components within normal limits   C-REACTIVE PROTEIN - Abnormal; Notable for the following components:    C-Reactive Protein 12.65 (*)     All other components within normal limits   SEDIMENTATION RATE - Abnormal; Notable for the following components:    Sed Rate 52 (*)     All other components within normal limits   URIC ACID - Normal   BLOOD CULTURE   BLOOD CULTURE   BASIC METABOLIC PANEL   BASIC METABOLIC PANEL   SODIUM, URINE, RANDOM   OSMOLALITY, URINE   OSMOLALITY, SERUM   POCT GLUCOSE FINGERSTICK   POCT GLUCOSE FINGERSTICK   POCT GLUCOSE FINGERSTICK   POCT GLUCOSE FINGERSTICK   POCT GLUCOSE FINGERSTICK   POCT GLUCOSE FINGERSTICK   POCT GLUCOSE FINGERSTICK   CBC AND DIFFERENTIAL    Narrative:     The following orders were created for panel order CBC & Differential.  Procedure                               Abnormality         Status                     ---------                               -----------         ------                     CBC Auto Differential[[id number]]        Abnormal            Final result                 Please view results for these tests on the individual orders.   EXTRA TUBES    Narrative:     The following orders were created for panel order Extra Tubes.  Procedure                               Abnormality         Status                     ---------                                -----------         ------                     Gold Top - [facility][[id number]]                                   Final result               Light Blue Top[[id number]]                                   Final result                 Please view results for these tests on the individual orders.   [facility] TOP - [facility]   LIGHT BLUE TOP       Meds given in ED:   Medications   cholecalciferol (VITAMIN D3) tablet 2,000 Units (has no administration in time range)   famotidine (PEPCID) tablet 40 mg (has no administration in time range)   levothyroxine (SYNTHROID, LEVOTHROID) tablet 200 mcg (has no administration in time range)   losartan (COZAAR) tablet 100 mg (has no administration in time range)   pravastatin (PRAVACHOL) tablet 40 mg (has no administration in time range)   pregabalin (LYRICA) capsule 150 mg (has no administration in time range)   sodium chloride 0.9 % flush 10 mL (has no administration in time range)   sodium chloride 0.9 % flush 10 mL (has no administration in time range)   sodium chloride 0.9 % infusion 40 mL (has no administration in time range)   sennosides-docusate (PERICOLACE) 8.6-50 MG per tablet 2 tablet (has no administration in time range)     And   polyethylene glycol (MIRALAX) packet 17 g (has no administration in time range)     And   bisacodyl (DULCOLAX) EC tablet 5 mg (has no administration in time range)     And   bisacodyl (DULCOLAX) suppository 10 mg (has no administration in time range)   Enoxaparin Sodium (LOVENOX) syringe 40 mg (has no administration in time range)   sodium chloride tablet 1 g (has no administration in time range)   sodium chloride 0.9 % infusion (has no administration in time range)   dextrose (GLUTOSE) oral gel 15 g (has no administration in time range)   dextrose (D50W) (25 g/50 mL) IV injection 25 g (has no administration in time range)   glucagon HCl (Diagnostic) injection 1 mg (has no administration in time range)   Insulin Aspart (novoLOG) injection 0-7 Units (has no  administration in time range)   HYDROcodone-acetaminophen (NORCO) 5-325 MG per tablet 1 tablet (1 tablet Oral Given 9/9/23 0155)     sodium chloride, 75 mL/hr         NIH Stroke Scale:       Isolation/Infection(s):  No active isolations   No active infections     COVID Testing  Collected not collected  Resulted na    Nursing report ED to floor:  Mental status: alert and oriented  Ambulatory status: with assist  Precautions: fall    ED nurse phone [phone]- [phone]

## 2023-09-09 NOTE — CONSULTS
[facility]   Consult Note    Patient Name: Andreas Billy  : 1955  MRN: [id number]  Primary Care Physician:  Talisha Francois APRN  Referring Physician: No ref. provider found  Date of admission: 2023    Inpatient Podiatry Consult  Consult performed by: Barrington Baker DPM  Consult ordered by: Julio Jordan MD      Subjective   Subjective     Reason for Consult/ Chief Complaint: Septic joint    History of Present Illness  Andreas Billy is a 68 y.o. male past medical history significant for PTSD, hypertension, Hashimoto's, anxiety and diabetes seen for evaluation of possible septic arthritis of right ankle.  Patient noticed significant pain and swelling starting approximately 3 days ago with inability to bear weight.  He denies any open sores.  He denies any injuries.    Review of Systems   Constitutional:  Positive for chills.   HENT:  Negative for facial swelling.    Cardiovascular:  Positive for leg swelling.   Gastrointestinal: Negative.    Musculoskeletal:         Right ankle pain   Skin:  Positive for color change. Negative for wound.   Psychiatric/Behavioral: Negative.        Personal History     Past Medical History:   Diagnosis Date    Abdominal pain     Acquired hypothyroidism     Anemia     Anxiety and depression     Arthritis     Coronary artery disease     Diabetes mellitus     Full dentures     Hashimoto's thyroiditis     Head injury     Hypertension     Peripheral neuropathy     PTSD (post-traumatic stress disorder)     Simple goiter     Sleep apnea     Wears glasses        Past Surgical History:   Procedure Laterality Date    CAROTID ENDARTERECTOMY Right 2018    Procedure: RIGHT CAROTID ENDARTERECTOMY carotid cerebral arteriogram     (C-ARM#2);  Surgeon: Bayron Griffin MD;  Location: [facility];  Service: Vascular    CAROTID ENDARTERECTOMY Left 2018    Procedure: CAROTID ENDARTERECTOMY                          (C-ARM);  Surgeon: Bayron Griffin,  MD;  Location: [facility] OR;  Service: Vascular    CARPAL TUNNEL RELEASE Bilateral     COLON RESECTION      COLONOSCOPY N/A 6/19/2023    Procedure: COLONOSCOPY;  Surgeon: Emery Estrada MD;  Location: [facility] ENDOSCOPY;  Service: Gastroenterology;  Laterality: N/A;    ENDOSCOPY N/A 6/19/2023    Procedure: ESOPHAGOGASTRODUODENOSCOPY;  Surgeon: Emery Estrada MD;  Location: [facility] ENDOSCOPY;  Service: Gastroenterology;  Laterality: N/A;    SHOULDER ARTHROSCOPY Left     VASECTOMY         Family History: family history includes Hypertension in an other family member; Thyroid disease in an other family member. Otherwise pertinent FHx was reviewed and not pertinent to current issue.    Social History:  reports that he has been smoking cigarettes. He has a 25.00 pack-year smoking history. He has been exposed to tobacco smoke. He has never used smokeless tobacco. He reports that he does not currently use alcohol. He reports current drug use. Drug: Marijuana.    Home Medications:   Dulaglutide, Glucose Blood, cholecalciferol, cloNIDine, famotidine, levothyroxine, losartan, metFORMIN, pravastatin, predniSONE, pregabalin, and valACYclovir    Allergies:  Allergies   Allergen Reactions    Vancomycin Rash     Allergy first known in 1980       Objective    Objective     Vitals:  Temp:  [98.4 °F (36.9 °C)-99.3 °F (37.4 °C)] 99.3 °F (37.4 °C)  Heart Rate:  [68-85] 85  Resp:  [16-20] 16  BP: (122-178)/(58-79) 178/79    Physical Exam  HENT:      Head: Normocephalic.   Pulmonary:      Effort: Pulmonary effort is normal.   Musculoskeletal:      Right ankle: Swelling present. Tenderness present. Decreased range of motion.        Legs:    Neurological:      Mental Status: He is alert.   Psychiatric:         Mood and Affect: Mood normal.       Result Review    Result Review:  I have personally reviewed the results from the time of this admission to 9/9/2023 07:04 CDT and agree with these findings:  [x]  Laboratory list / accordion  []   Microbiology  [x]  Radiology  []  EKG/Telemetry   []  Cardiology/Vascular   []  Pathology  []  Old records  []  Other:      Assessment & Plan   Assessment / Plan       Active Hospital Problems:  Active Hospital Problems    Diagnosis     **Septic joint      Plan:     Patient examined and evaluated.  Imaging and labs reviewed.  Right ankle joint was tapped with local anesthesia.  Approximately 3 cc of fluid aspirated which was sent for evaluation.  MRI of right ankle ordered.  N.p.o. after midnight.  Plan for incision and drainage right ankle and all indicated procedures.  Risks and benefits discussed in detail.  No guarantees given or implied.  All patient's questions were answered.  Thank you for allow me to participate in the care of this patient.  Please do not hesitate to call questions.    Barrington Baker DPM

## 2023-09-09 NOTE — H&P
[facility] Admission      Date of Admission: 9/9/2023      Primary Care Physician: Talisha Francois APRN      Chief Complaint: Right ankle pain    HPI:    Patient is a 68-year-old male with known past medical history of rheumatoid arthritis on prednisone 20 mg a day for a long time, diabetes mellitus type 2, hypertension hypothyroidism peripheral neuropathy, posttraumatic stress disorder, coronary artery disease, sleep apnea who presented to ER with complaint of right ankle pain.  Dr. Baker was consulted in ED concerning possible right ankle septic arthritis who recommended holding on IV antibiotic pending his evaluation in a.m. possible joint aspiration.  Hospitalist service was called for admission of the patient.    I have seen and examined patient in ED room 3.  His daughter Vanesa at bedside.  Patient reports 5 days ago he woke up from sleep and he noted his ankles were swollen particularly his right ankle.  He reports 3 days ago he woke up from sleep and noted that he could not walk on his right ankle because of pain and swelling.  He had some chills and subjective fever.  He has not been taking his prednisone over the last 2 days.  He reports some pain to right calf on questioning.  He reports his pain medication does not take care of his pain.  He denies any fall injury trauma headache sore throat chest pain shortness of breath palpitation nausea vomiting abdominal pain constipation diarrhea nausea vomiting, bleeding.  Patient denies any pain to other joints, small joints.  Patient reports drink 4 bottles of water daily.    Concurrent Medical History:  has a past medical history of Abdominal pain, Acquired hypothyroidism, Anemia, Anxiety and depression, Arthritis, Coronary artery disease, Diabetes mellitus, Full dentures, Hashimoto's thyroiditis, Head injury, Hypertension, Peripheral neuropathy, PTSD (post-traumatic stress disorder), Simple goiter,  Sleep apnea, and Wears glasses.    Past Surgical History:  has a past surgical history that includes Vasectomy; Carpal tunnel release (Bilateral); Shoulder arthroscopy (Left); Colectomy; Carotid Endarterectomy (Right, 5/23/2018); Carotid Endarterectomy (Left, 6/27/2018); Esophagogastroduodenoscopy (N/A, 6/19/2023); and Colonoscopy (N/A, 6/19/2023).    Family History: family history includes Hypertension in an other family member; Thyroid disease in an other family member.     Social History:  reports that he has been smoking cigarettes. He has a 25.00 pack-year smoking history. He has been exposed to tobacco smoke. He has never used smokeless tobacco. He reports that he does not currently use alcohol. He reports current drug use. Drug: Marijuana.    Allergies:   Allergies   Allergen Reactions    Vancomycin Rash     Allergy first known in 1980       Medications:   Prior to Admission medications    Medication Sig Start Date End Date Taking? Authorizing Provider   cholecalciferol (VITAMIN D3) 1000 units tablet Take 2 tablets by mouth Daily.   Yes Jo Balderrama MD   CloNIDine (CATAPRES) 0.1 MG tablet Take 1 tablet by mouth As Needed (high BP).   Yes Jo Balderrama MD   Dulaglutide 0.75 MG/0.5ML solution pen-injector Trulicity 0.75 mg/0.5 mL subcutaneous pen injector   INJECT CONENTS OF 1 PEN ONCE WEEKLY   Yes Jo Balderrama MD   famotidine (PEPCID) 40 MG tablet Take 1 tablet by mouth Daily.   Yes Jo Balderrama MD   Glucose Blood (COOL BLOOD GLUCOSE TEST STRIPS VI) Every 8 (Eight) Hours.   Yes Jo Balderrama MD   levothyroxine (SYNTHROID, LEVOTHROID) 200 MCG tablet Take 1 tablet by mouth Daily.   Yes Jo Balderrama MD   losartan (COZAAR) 100 MG tablet Take 1 tablet by mouth Daily. 4/10/23  Yes Jo Balderrama MD   pravastatin (PRAVACHOL) 40 MG tablet  6/22/23  Yes Jo Balderrama MD   predniSONE (DELTASONE) 10 MG tablet Take 1 tablet by mouth 2 (Two) Times a Day.    Yes Jo Balderrama MD   pregabalin (LYRICA) 75 MG capsule Take 2 capsules by mouth 2 (Two) Times a Day.   Yes Jo Balderrama MD   valACYclovir (VALTREX) 500 MG tablet Take 1 tablet by mouth Daily.   Yes Jo Balderrama MD   metFORMIN (GLUCOPHAGE) 1000 MG tablet Take 1 tablet by mouth.  Patient not taking: Reported on 8/17/2023    Jo Balderrama MD       Review of Systems:  Review of Systems   Constitutional:  Negative for chills, diaphoresis, fatigue and fever.   HENT:  Negative for congestion, dental problem, ear pain, facial swelling, rhinorrhea and sinus pressure.    Eyes:  Negative for photophobia, discharge, redness, itching and visual disturbance.   Respiratory:  Negative for apnea, cough, choking, chest tightness, shortness of breath, wheezing and stridor.    Cardiovascular:  Negative for chest pain, palpitations and leg swelling.   Gastrointestinal:  Negative for abdominal distention, abdominal pain, anal bleeding, blood in stool, diarrhea, nausea, rectal pain and vomiting.   Endocrine: Negative for cold intolerance, heat intolerance, polydipsia, polyphagia and polyuria.   Genitourinary:  Negative for difficulty urinating, flank pain, frequency, hematuria and urgency.   Musculoskeletal:  Positive for arthralgias. Negative for back pain, joint swelling and myalgias.   Skin:  Negative for pallor, rash and wound.   Allergic/Immunologic: Negative for environmental allergies and immunocompromised state.   Neurological:  Negative for dizziness, tremors, seizures, facial asymmetry, speech difficulty, weakness, light-headedness, numbness and headaches.   Hematological:  Negative for adenopathy. Does not bruise/bleed easily.   Psychiatric/Behavioral:  Negative for agitation, behavioral problems and hallucinations. The patient is not nervous/anxious.     Otherwise complete ROS is negative except as mentioned above.    Physical Exam:   Temp:  [98.4 °F (36.9 °C)] 98.4 °F (36.9 °C)  Heart Rate:   [72-85] 72  Resp:  [18-20] 18  BP: (127-148)/(63-70) 132/63  Physical Exam  Constitutional:       General: He is not in acute distress.     Appearance: He is normal weight. He is not ill-appearing, toxic-appearing or diaphoretic.   HENT:      Head: Normocephalic and atraumatic.      Right Ear: External ear normal.      Left Ear: External ear normal.      Nose: Nose normal.      Mouth/Throat:      Mouth: Mucous membranes are moist.      Pharynx: Oropharynx is clear.   Eyes:      Extraocular Movements: Extraocular movements intact.      Conjunctiva/sclera: Conjunctivae normal.      Pupils: Pupils are equal, round, and reactive to light.   Cardiovascular:      Rate and Rhythm: Normal rate and regular rhythm.      Heart sounds: No murmur heard.    No friction rub. No gallop.   Pulmonary:      Effort: No respiratory distress.      Breath sounds: No stridor. No wheezing or rales.   Chest:      Chest wall: No tenderness.   Abdominal:      General: Abdomen is flat. There is no distension.      Palpations: Abdomen is soft.      Tenderness: There is no abdominal tenderness. There is no guarding or rebound.   Musculoskeletal:         General: Swelling (right ankle, right foot.  Trace left foot swelling) and deformity (right ankle and foot warm, tener right ankle on palpitation) present. No tenderness.      Cervical back: No rigidity or tenderness.      Right lower leg: Edema present.      Left lower leg: Edema (trace) present.   Lymphadenopathy:      Cervical: No cervical adenopathy.   Skin:     General: Skin is warm and dry.      Coloration: Skin is not jaundiced.      Findings: No erythema.   Neurological:      Mental Status: He is alert and oriented to person, place, and time. Mental status is at baseline.      Sensory: No sensory deficit.      Motor: No weakness.      Coordination: Coordination normal.   Psychiatric:         Mood and Affect: Mood normal.         Behavior: Behavior normal.         Judgment: Judgment  normal.         Results Reviewed:  I have personally reviewed current lab, radiology, and data and agree with results.  Lab Results (last 24 hours)       Procedure Component Value Units Date/Time    C-reactive Protein [[id number]]  (Abnormal) Collected: 09/09/23 0151    Specimen: Blood Updated: 09/09/23 0327     C-Reactive Protein 12.65 mg/dL     Sedimentation Rate [[id number]]  (Abnormal) Collected: 09/09/23 0151    Specimen: Blood Updated: 09/09/23 0318     Sed Rate 52 mm/hr     Extra Tubes [[id number]] Collected: 09/09/23 0151    Specimen: Blood, Venous Line Updated: 09/09/23 0300    Narrative:      The following orders were created for panel order Extra Tubes.  Procedure                               Abnormality         Status                     ---------                               -----------         ------                     Gold Top - SST[[id number]]                                   Final result               Light Blue Top[[id number]]                                   Final result                 Please view results for these tests on the individual orders.    Gold Top - SST [[id number]] Collected: 09/09/23 0151    Specimen: Blood Updated: 09/09/23 0300     Extra Tube Hold for add-ons.     Comment: Auto resulted.       Light Blue Top [[id number]] Collected: 09/09/23 0151    Specimen: Blood Updated: 09/09/23 0300     Extra Tube Hold for add-ons.     Comment: Auto resulted       Comprehensive Metabolic Panel [[id number]]  (Abnormal) Collected: 09/09/23 0151    Specimen: Blood Updated: 09/09/23 0223     Glucose 174 mg/dL      BUN 15 mg/dL      Creatinine 1.02 mg/dL      Sodium 127 mmol/L      Potassium 3.8 mmol/L      Chloride 92 mmol/L      CO2 22.0 mmol/L      Calcium 9.2 mg/dL      Total Protein 7.6 g/dL      Albumin 4.3 g/dL      ALT (SGPT) 14 U/L      AST (SGOT) 14 U/L      Alkaline Phosphatase 64 U/L      Total Bilirubin 0.7 mg/dL      Globulin 3.3 gm/dL      A/G Ratio 1.3 g/dL      BUN/Creatinine Ratio  14.7     Anion Gap 13.0 mmol/L      eGFR 80.6 mL/min/1.73     Narrative:      GFR Normal >60  Chronic Kidney Disease <60  Kidney Failure <15      Uric Acid [[id number]]  (Normal) Collected: 09/09/23 0151    Specimen: Blood Updated: 09/09/23 0223     Uric Acid 4.7 mg/dL     Urinalysis, Microscopic Only - Urine, Clean Catch [[id number]]  (Abnormal) Collected: 09/09/23 0156    Specimen: Urine, Clean Catch Updated: 09/09/23 0212     RBC, UA 3-5 /HPF      WBC, UA 0-2 /HPF      Bacteria, UA None Seen /HPF      Squamous Epithelial Cells, UA 0-2 /HPF      Hyaline Casts, UA 0-2 /LPF      Methodology Automated Microscopy    Urinalysis With Microscopic If Indicated (No Culture) - Urine, Clean Catch [[id number]]  (Abnormal) Collected: 09/09/23 0156    Specimen: Urine, Clean Catch Updated: 09/09/23 0209     Color, UA Yellow     Appearance, UA Clear     pH, UA 7.0     Specific Gravity, UA 1.015     Glucose, UA Negative     Ketones, UA Negative     Bilirubin, UA Negative     Blood, UA Trace     Protein, UA >=300 mg/dL (3+)     Leuk Esterase, UA Negative     Nitrite, UA Negative     Urobilinogen, UA 0.2 E.U./dL    CBC & Differential [[id number]]  (Abnormal) Collected: 09/09/23 0151    Specimen: Blood Updated: 09/09/23 0204    Narrative:      The following orders were created for panel order CBC & Differential.  Procedure                               Abnormality         Status                     ---------                               -----------         ------                     CBC Auto Differential[[id number]]        Abnormal            Final result                 Please view results for these tests on the individual orders.    CBC Auto Differential [[id number]]  (Abnormal) Collected: 09/09/23 0151    Specimen: Blood Updated: 09/09/23 0204     WBC 14.49 10*3/mm3      RBC 4.37 10*6/mm3      Hemoglobin 11.6 g/dL      Hematocrit 35.3 %      MCV 80.8 fL      MCH 26.5 pg      MCHC 32.9 g/dL      RDW 16.5 %      RDW-SD 49.1 fl      MPV  8.9 fL      Platelets 384 10*3/mm3      Neutrophil % 77.0 %      Lymphocyte % 12.6 %      Monocyte % 9.4 %      Eosinophil % 0.3 %      Basophil % 0.2 %      Immature Grans % 0.5 %      Neutrophils, Absolute 11.16 10*3/mm3      Lymphocytes, Absolute 1.83 10*3/mm3      Monocytes, Absolute 1.36 10*3/mm3      Eosinophils, Absolute 0.04 10*3/mm3      Basophils, Absolute 0.03 10*3/mm3      Immature Grans, Absolute 0.07 10*3/mm3      nRBC 0.0 /100 WBC           Imaging Results (Last 24 Hours)       Procedure Component Value Units Date/Time    XR Foot 3+ View Bilateral [[id number]] Collected: 09/09/23 0309     Updated: 09/09/23 0322    Narrative:      XR FOOT BILATERAL MIN 3 VIEWS    HISTORY:  Pain    COMPARISON:  8/10/2023    TECHNIQUE:  Frontal, lateral and oblique radiographs of bilateral feet were provided for  review, 6 views total.    FINDINGS:  Right foot:  There is severe hallux valgus deformity. Soft tissue edema is noted over the  hallux valgus deformity. Diffuse degenerative changes noted throughout the right  foot.    Left foot:  Severe hallux valgus deformity is noted. Degenerative changes noted throughout  the left foot.      Impression:      1. Degenerative changes and hallux valgus deformities as described. Overall  findings appear stable since 8/10/2023.    XR Ankle 3+ View Bilateral [[id number]] Collected: 09/09/23 0301     Updated: 09/09/23 0313    Narrative:      XR ANKLE 3+ VW BILATERAL    HISTORY:  Pain, edema    COMPARISON:  None.    TECHNIQUE:  Frontal, lateral and oblique radiographs of the left ankle and right ankle were  provided for review, 6 views total.    FINDINGS:    Right side:  There is marked soft tissue edema over lateral malleolus.    There is no fracture, subluxation or dislocation.    There is moderate degenerative change of tibiotalar joint.    Left side:  Moderate soft tissue swelling is noted over lateral malleolus.    Mild degenerative changes noted in the hindfoot. Mild calcific  enthesopathy is  noted from plantar fascial origin on calcaneus.      Impression:      1. Right side with diffuse soft tissue swelling over lateral malleolus and  degenerative change as described.    2. Left side with moderate soft tissue swelling over lateral malleolus and  degenerative change as described.    3. No definite radiographic evidence of acute osseous abnormality.                    Assessment:    Active Hospital Problems    Diagnosis     **Septic joint      # Right ankle, right foot swelling  # Right ankle swelling and tenderness, septic arthritis cannot be excluded  # Immune compromised state by taking prednisone  # Hyponatremia, euvolemic  # Rheumatoid arthritis   # Leukocytosis reactive   # Diabetes mellitus type 2   # Peripheral neuropathy   # Anxiety depression  # History of coronary artery disease not further specified  # Hypothyroidism   # Chronic anemia         Treatment plan:  I discussed the care with ED attending Dr. Jordan and at length with patient and his daughter at bedside.  I reviewed independently laboratory work-up and imaging studies  Placed on observation  Place on telemetry  Obtain blood culture  Obtain further laboratory work-up including TSH hemoglobin A1c level uric acid level, uric acid level  Follow CBC CMP magnesium level  ESR and CRP both elevated.  Patient with leukocytosis.  However he has been on prednisone outpatient for treatment of his rheumatoid arthritis.  We will monitor clinical status closely.  Obtain Doppler ultrasound of right lower extremity to avoid missing any DVT considering his complaint of Right calf tenderness palpitation  As requested by podiatry service Dr. Baker we will hold initiation of (IV) antibiotic overnight pending Dr. Baker evaluation in a.m.  Following his evaluation and potential joint aspiration, patient may benefit from being on antibiotic pending further evaluation considering his immune compromised state use of prednisone.  We will hold  prednisone pending above evaluation and intervention too.  In evaluation of hyponatremia will obtain urine sodium level urine osmolality serum osmolality, TSH free T4 level.  Avoid rapid correction of hyponatremia to minimize risk of associated complications by rapid correction.  Follow BMP every 6 hours for immediate care.  Give dose of sodium chloride tablet.   Maintain n.p.o. pending podiatry service evaluation.  Placed on analgesics.  Patient asked for IV narcotics  Comorbidities, chronic medical problems will be treated appropriately  Reconcile home medication and continue with essential home medication.  Obtain vitamin D level considering long-term treatment with the steroids outpatient.  Patient has been on vitamin D supplement.  Accu-Chek ACHS  Insulin sliding scale  Diabetic diet  DVT and GI prophylaxis will be initiated.  Please see orders for comprehensive plan.        Medical Decision Making  Number and Complexity of problems:, Acute medical problems as above.  Complex decision making.  Differential Diagnosis: Entertained considered and reflected in orders.    Conditions and Status:        Condition is worsening.     [facility] Data  External documents reviewed:   My EKG interpretation:   My plain film interpretation: No obvious fracture.  Soft tissue swelling  Tests considered but not ordered:      Decision rules/scores evaluated (example [id number], [place], etc):      Discussed with: ED attending Dr. Jordan and patient and his daughter at bedside.   I have utilized all available immediate resources to obtain, update, or review the patient's current medications (including all prescriptions, over-the-counter products, herbals, cannabis/cannabidiol products, and vitamin/mineral/dietary (nutritional) supplements).          Care Planning  Shared decision making: ED attending Dr. Jordan, podiatrist Dr. Baker, patient and his daughter.  Code status and discussions: Patient decided for full code.  Patient decided  that his daughter Loyda to be his healthcare proxy    Disposition  Social Determinants of Health that impact treatment or disposition:   I expect the patient to be discharged to home in to be determined days.          I confirmed that the patient's Advance Care Plan is present, code status is documented, or surrogate decision maker is listed in the patient's medical record.     I have utilized all available immediate resources to obtain, update, or review the patient's current medications.     I discussed the patient's findings and my recommendations with: Patient and his daughter both agreed with above plan of care including reported recommendations of podiatry service Dr. Baker for holding antibiotic pending his evaluation.      Saeid Behroozi, MD   09/09/23   04:02 CDT

## 2023-09-09 NOTE — ED PROVIDER NOTES
Subjective   History of Present Illness  Patient presents with right ankle pain and swelling and inability to bear weight.  Patient woke up with bilateral and foot swelling 5 days ago.  3 days ago, he put could not put any weight on his right foot.  He denies fever or chills.  He has not had this before.  He does have neuropathy and describes the pain as lightening bolt shooting from his feet.    Review of Systems   Musculoskeletal:  Positive for joint swelling (bilat feet and ankles).   Skin:  Positive for color change (redness to rigth ankle).     Past Medical History:   Diagnosis Date    Abdominal pain     Acquired hypothyroidism     Anemia     Anxiety and depression     Arthritis     Coronary artery disease     Diabetes mellitus     Full dentures     Hashimoto's thyroiditis     Head injury     Hypertension     Peripheral neuropathy     PTSD (post-traumatic stress disorder)     Simple goiter     Sleep apnea     Wears glasses        Allergies   Allergen Reactions    Vancomycin Rash     Allergy first known in 1980       Past Surgical History:   Procedure Laterality Date    CAROTID ENDARTERECTOMY Right 5/23/2018    Procedure: RIGHT CAROTID ENDARTERECTOMY carotid cerebral arteriogram     (C-ARM#2);  Surgeon: Bayron Griffin MD;  Location: [facility] OR;  Service: Vascular    CAROTID ENDARTERECTOMY Left 6/27/2018    Procedure: CAROTID ENDARTERECTOMY                          (C-ARM);  Surgeon: Bayron Griffin MD;  Location: [facility] OR;  Service: Vascular    CARPAL TUNNEL RELEASE Bilateral     COLON RESECTION      COLONOSCOPY N/A 6/19/2023    Procedure: COLONOSCOPY;  Surgeon: Emery Estrada MD;  Location: [facility] ENDOSCOPY;  Service: Gastroenterology;  Laterality: N/A;    ENDOSCOPY N/A 6/19/2023    Procedure: ESOPHAGOGASTRODUODENOSCOPY;  Surgeon: Emery Estrada MD;  Location: [facility] ENDOSCOPY;  Service: Gastroenterology;  Laterality: N/A;    SHOULDER ARTHROSCOPY Left     VASECTOMY         Family History  "  Problem Relation Age of Onset    Hypertension Other     Thyroid disease Other        Social History     Socioeconomic History    Marital status: Single   Tobacco Use    Smoking status: Every Day     Packs/day: 0.50     Years: 50.00     Pack years: 25.00     Types: Cigarettes     Passive exposure: Past    Smokeless tobacco: Never    Tobacco comments:     Passive his whole life   Vaping Use    Vaping Use: Never used   Substance and Sexual Activity    Alcohol use: Not Currently     Comment: none since 2005    Drug use: Yes     Types: Marijuana    Sexual activity: Defer           Objective   Physical Exam  Vitals and nursing note reviewed.   HENT:      Head: Normocephalic.      Nose: Nose normal.      Mouth/Throat:      Mouth: Mucous membranes are moist.   Eyes:      Extraocular Movements: Extraocular movements intact.   Cardiovascular:      Rate and Rhythm: Normal rate and regular rhythm.   Pulmonary:      Effort: Pulmonary effort is normal.      Breath sounds: Normal breath sounds.   Abdominal:      Palpations: Abdomen is soft.   Musculoskeletal:         General: Tenderness (bilat feet and ankles, right greater than left) present.   Skin:     Findings: Erythema (right ankle) present.   Neurological:      Mental Status: He is alert and oriented to person, place, and time.       Procedures           ED Course  ED Course as of 09/09/23 0640   Sat Sep 09, 2023   0346 Spoke with Dr. Baker, foot and ankle.  He would like to have no antibiotics until he is able to needle aspirate from right ankle. [JAVIER]      ED Course User Index  [JAVIER] Julio Jordan MD           Vitals:    09/09/23 0245 09/09/23 0330 09/09/23 0430 09/09/23 0534   BP: 148/70 132/63 122/58 178/79   BP Location: Right arm   Left arm   Patient Position: Lying   Sitting   Pulse: 78 72 68 85   Resp: 18   16   Temp:    99.3 °F (37.4 °C)   TempSrc:    Oral   SpO2: 93% 94% 95% 96%   Weight:    85.3 kg (188 lb)   Height:    182.9 cm (72\")      Lab Results (last " 24 hours)       Procedure Component Value Units Date/Time    Osmolality, Serum [[id number]]  (Normal) Collected: 09/09/23 0151    Specimen: Blood Updated: 09/09/23 0536     Osmolality 282 mOsm/kg     C-reactive Protein [[id number]]  (Abnormal) Collected: 09/09/23 0151    Specimen: Blood Updated: 09/09/23 0327     C-Reactive Protein 12.65 mg/dL     Sedimentation Rate [[id number]]  (Abnormal) Collected: 09/09/23 0151    Specimen: Blood Updated: 09/09/23 0318     Sed Rate 52 mm/hr     Extra Tubes [[id number]] Collected: 09/09/23 0151    Specimen: Blood Updated: 09/09/23 0300    Narrative:      The following orders were created for panel order Extra Tubes.  Procedure                               Abnormality         Status                     ---------                               -----------         ------                     Gold Top - SST[[id number]]                                   Final result               Light Blue Top[[id number]]                                   Final result                 Please view results for these tests on the individual orders.    Gold Top - SST [[id number]] Collected: 09/09/23 0151    Specimen: Blood Updated: 09/09/23 0300     Extra Tube Hold for add-ons.     Comment: Auto resulted.       Light Blue Top [[id number]] Collected: 09/09/23 0151    Specimen: Blood Updated: 09/09/23 0300     Extra Tube Hold for add-ons.     Comment: Auto resulted       Comprehensive Metabolic Panel [[id number]]  (Abnormal) Collected: 09/09/23 0151    Specimen: Blood Updated: 09/09/23 0223     Glucose 174 mg/dL      BUN 15 mg/dL      Creatinine 1.02 mg/dL      Sodium 127 mmol/L      Potassium 3.8 mmol/L      Chloride 92 mmol/L      CO2 22.0 mmol/L      Calcium 9.2 mg/dL      Total Protein 7.6 g/dL      Albumin 4.3 g/dL      ALT (SGPT) 14 U/L      AST (SGOT) 14 U/L      Alkaline Phosphatase 64 U/L      Total Bilirubin 0.7 mg/dL      Globulin 3.3 gm/dL      A/G Ratio 1.3 g/dL      BUN/Creatinine Ratio 14.7     Anion  Gap 13.0 mmol/L      eGFR 80.6 mL/min/1.73     Narrative:      GFR Normal >60  Chronic Kidney Disease <60  Kidney Failure <15      Uric Acid [[id number]]  (Normal) Collected: 09/09/23 0151    Specimen: Blood Updated: 09/09/23 0223     Uric Acid 4.7 mg/dL     Urinalysis, Microscopic Only - Urine, Clean Catch [[id number]]  (Abnormal) Collected: 09/09/23 0156    Specimen: Urine, Clean Catch Updated: 09/09/23 0212     RBC, UA 3-5 /HPF      WBC, UA 0-2 /HPF      Bacteria, UA None Seen /HPF      Squamous Epithelial Cells, UA 0-2 /HPF      Hyaline Casts, UA 0-2 /LPF      Methodology Automated Microscopy    Urinalysis With Microscopic If Indicated (No Culture) - Urine, Clean Catch [[id number]]  (Abnormal) Collected: 09/09/23 0156    Specimen: Urine, Clean Catch Updated: 09/09/23 0209     Color, UA Yellow     Appearance, UA Clear     pH, UA 7.0     Specific Gravity, UA 1.015     Glucose, UA Negative     Ketones, UA Negative     Bilirubin, UA Negative     Blood, UA Trace     Protein, UA >=300 mg/dL (3+)     Leuk Esterase, UA Negative     Nitrite, UA Negative     Urobilinogen, UA 0.2 E.U./dL    CBC & Differential [[id number]]  (Abnormal) Collected: 09/09/23 0151    Specimen: Blood Updated: 09/09/23 0204    Narrative:      The following orders were created for panel order CBC & Differential.  Procedure                               Abnormality         Status                     ---------                               -----------         ------                     CBC Auto Differential[[id number]]        Abnormal            Final result                 Please view results for these tests on the individual orders.    CBC Auto Differential [[id number]]  (Abnormal) Collected: 09/09/23 0151    Specimen: Blood Updated: 09/09/23 0204     WBC 14.49 10*3/mm3      RBC 4.37 10*6/mm3      Hemoglobin 11.6 g/dL      Hematocrit 35.3 %      MCV 80.8 fL      MCH 26.5 pg      MCHC 32.9 g/dL      RDW 16.5 %      RDW-SD 49.1 fl      MPV 8.9 fL       Platelets 384 10*3/mm3      Neutrophil % 77.0 %      Lymphocyte % 12.6 %      Monocyte % 9.4 %      Eosinophil % 0.3 %      Basophil % 0.2 %      Immature Grans % 0.5 %      Neutrophils, Absolute 11.16 10*3/mm3      Lymphocytes, Absolute 1.83 10*3/mm3      Monocytes, Absolute 1.36 10*3/mm3      Eosinophils, Absolute 0.04 10*3/mm3      Basophils, Absolute 0.03 10*3/mm3      Immature Grans, Absolute 0.07 10*3/mm3      nRBC 0.0 /100 WBC            CT Angio Abdominal Aorta Bilateral Iliofem Runoff    Result Date: 9/7/2023  Severe atherosclerotic disease throughout the body. Left lower extremity: Severe stenoses external iliac and SFA.  Occlusion posterior tibial and anterior tibial arteries. Right lower extremity: Severe stenoses SFA.  Occlusion anterior tibial artery and distal occlusion versus sluggish flow peroneal artery.     XR Ankle 3+ View Bilateral    Result Date: 9/9/2023  1. Right side with diffuse soft tissue swelling over lateral malleolus and degenerative change as described. 2. Left side with moderate soft tissue swelling over lateral malleolus and degenerative change as described. 3. No definite radiographic evidence of acute osseous abnormality.     XR Foot 3+ View Bilateral    Result Date: 9/9/2023  1. Degenerative changes and hallux valgus deformities as described. Overall findings appear stable since 8/10/2023.                                    Medical Decision Making  Patient with septic joint of right ankle.  Labs significant for elevated CRP and sed rate.  X-rays significant for soft tissue swelling.  Discussed case with Dr. Baker, podiatry, and hospitalist who accept patient for admission.  Dr. Baker is requesting no IV antibiotics given until he is able to aspirate the joint for culture.  This was conveyed to hospitalist.    Problems Addressed:  Septic arthritis of right ankle, due to unspecified organism: complicated acute illness or injury    Amount and/or Complexity of Data Reviewed  Labs:  ordered.  Radiology: ordered.    Risk  Prescription drug management.  Decision regarding hospitalization.        Final diagnoses:   Septic arthritis of right ankle, due to unspecified organism       ED Disposition  ED Disposition       ED Disposition   Decision to Admit    Condition   --    Comment   Level of Care: Med/Surg [1]   Diagnosis: Septic joint [761246]   Admitting Physician: BEHROOZI, SAEID [[id number]]   Attending Physician: BEHROOZI, SAEID [[id number]]                 No follow-up provider specified.       Medication List      No changes were made to your prescriptions during this visit.         This document has been electronically signed by Julio Jordan MD on September 9, 2023 06:43 CDT  .sig  Natureline  Part of this note may be an electronic transcription/translation of spoken language to printed text using the Dragon Dictation System.        Julio Jordan MD  09/09/23 0695

## 2023-09-09 NOTE — PROGRESS NOTES
[facility]  INPATIENT PROGRESS NOTE    Length of Stay: 0  Date of Admission: 9/9/2023  Primary Care Physician: Talisha Francois APRN    Subjective   Chief Complaint: Right ankle pain.     HPI: This is a 68-year-old male with past medical history of rheumatoid arthritis (on prednisone 20 mg a day for a long time), diabetes mellitus type 2, hypertension, hypothyroidism, peripheral neuropathy, posttraumatic stress disorder, coronary artery disease and sleep apnea that presented to [facility] on 9/9/2023 with complaints of right ankle pain.    The patient was admitted for possible septic arthritis of the right ankle.  He was evaluated by Dr. Baker with podiatry and the right ankle was tapped with 3 cc of fluid aspirated and sent for evaluation.  I&D is planned of the right ankle in the a.m.  MRI pending.    Review of Systems   Constitutional:  Negative for activity change and fatigue.   HENT:  Negative for ear pain and sore throat.    Eyes:  Negative for pain and discharge.   Respiratory:  Negative for cough and shortness of breath.    Cardiovascular:  Negative for chest pain and palpitations.   Gastrointestinal:  Negative for abdominal pain and nausea.   Endocrine: Negative for cold intolerance and heat intolerance.   Genitourinary:  Negative for difficulty urinating and dysuria.   Musculoskeletal:  Positive for arthralgias and joint swelling. Negative for gait problem.   Skin:  Negative for color change and rash.   Neurological:  Negative for dizziness and weakness.   Psychiatric/Behavioral:  Negative for agitation and confusion.         Objective    Temp:  [97.7 °F (36.5 °C)-99.3 °F (37.4 °C)] 98.7 °F (37.1 °C)  Heart Rate:  [66-85] 66  Resp:  [16-20] 16  BP: (108-178)/(58-79) 108/58    Physical Exam  Constitutional:       Appearance: He is well-developed.   HENT:      Head: Normocephalic and atraumatic.   Eyes:      Pupils: Pupils are equal, round, and reactive to  light.   Cardiovascular:      Rate and Rhythm: Normal rate and regular rhythm.   Pulmonary:      Effort: Pulmonary effort is normal.      Breath sounds: Normal breath sounds.   Abdominal:      General: Bowel sounds are normal.      Palpations: Abdomen is soft.   Musculoskeletal:         General: Normal range of motion.      Cervical back: Normal range of motion and neck supple.   Skin:     General: Skin is warm and dry.   Neurological:      Mental Status: He is alert and oriented to person, place, and time.   Psychiatric:         Behavior: Behavior normal.     Results Review:  I have reviewed the labs, radiology results, and diagnostic studies.    Laboratory Data:   Results from last 7 days   Lab Units 09/09/23  1139 09/09/23  0653 09/09/23  0151   SODIUM mmol/L 128* 131* 127*   POTASSIUM mmol/L 3.9 3.7 3.8   CHLORIDE mmol/L 94* 96* 92*   CO2 mmol/L 25.0 25.0 22.0   BUN mg/dL 15 14 15   CREATININE mg/dL 1.07 1.04 1.02   GLUCOSE mg/dL 266* 139* 174*   CALCIUM mg/dL 8.9 9.0 9.2   BILIRUBIN mg/dL  --   --  0.7   ALK PHOS U/L  --   --  64   ALT (SGPT) U/L  --   --  14   AST (SGOT) U/L  --   --  14   ANION GAP mmol/L 9.0 10.0 13.0     Estimated Creatinine Clearance: 80.8 mL/min (by C-G formula based on SCr of 1.07 mg/dL).      Results from last 7 days   Lab Units 09/09/23  0151   URIC ACID mg/dL 4.7     Results from last 7 days   Lab Units 09/09/23  0151   WBC 10*3/mm3 14.49*   HEMOGLOBIN g/dL 11.6*   HEMATOCRIT % 35.3*   PLATELETS 10*3/mm3 384           Culture Data:   No results found for: BLOODCX  No results found for: URINECX  No results found for: RESPCX  No results found for: WOUNDCX  No results found for: STOOLCX  No components found for: BODYFLD    Radiology Data:   Imaging Results (Last 24 Hours)       Procedure Component Value Units Date/Time    MRI Ankle Right Without Contrast [[id number]] Collected: 09/09/23 0824     Updated: 09/09/23 0916    Narrative:      MRI ANKLE RIGHT WO CONTRAST    INDICATION:  Septic  arthritis suspected, ankle, x-ray done.    COMPARISON:  Radiographs 9/9/2023.    TECHNIQUE:  Multiplanar, multisequential MR images of the right ankle were obtained without  contrast.    FINDINGS:  Diffuse subcutaneous edema is present. Additionally, there is hyperintense  T2-weighted signal in the musculature of the foot. A small ankle joint effusion  is present.    No tendon tear is identified. The anterior talofibular ligament is likely torn.    No marrow edema is seen.      Impression:      Cellulitis and right ankle joint effusion. However, there is no marrow edema.  The joint effusion is most likely reactive, but an early septic arthritis is not  entirely excluded.    US Venous Doppler Lower Extremity Bilateral (duplex) [[id number]] Collected: 09/09/23 0829     Updated: 09/09/23 0832    Narrative:      US VENOUS DOPPLER LOWER EXTREMITY BILATERAL (DUPLEX)    HISTORY: Rule out DVT    COMPARISON: NONE    FINDINGS:  Transverse and longitudinal grayscale and Doppler sonographic images of  bilateral lower extremities were obtained.    There is normal flow and compressibility of bilateral common femoral,  superficial femoral, and calf veins.      Impression:      1. Normal duplex ultrasound of bilateral lower extremities without evidence of  DVT.      XR Foot 3+ View Bilateral [[id number]] Collected: 09/09/23 0309     Updated: 09/09/23 0322    Narrative:      XR FOOT BILATERAL MIN 3 VIEWS    HISTORY:  Pain    COMPARISON:  8/10/2023    TECHNIQUE:  Frontal, lateral and oblique radiographs of bilateral feet were provided for  review, 6 views total.    FINDINGS:  Right foot:  There is severe hallux valgus deformity. Soft tissue edema is noted over the  hallux valgus deformity. Diffuse degenerative changes noted throughout the right  foot.    Left foot:  Severe hallux valgus deformity is noted. Degenerative changes noted throughout  the left foot.      Impression:      1. Degenerative changes and hallux valgus deformities  as described. Overall  findings appear stable since 8/10/2023.    XR Ankle 3+ View Bilateral [[id number]] Collected: 09/09/23 0301     Updated: 09/09/23 0313    Narrative:      XR ANKLE 3+ VW BILATERAL    HISTORY:  Pain, edema    COMPARISON:  None.    TECHNIQUE:  Frontal, lateral and oblique radiographs of the left ankle and right ankle were  provided for review, 6 views total.    FINDINGS:    Right side:  There is marked soft tissue edema over lateral malleolus.    There is no fracture, subluxation or dislocation.    There is moderate degenerative change of tibiotalar joint.    Left side:  Moderate soft tissue swelling is noted over lateral malleolus.    Mild degenerative changes noted in the hindfoot. Mild calcific enthesopathy is  noted from plantar fascial origin on calcaneus.      Impression:      1. Right side with diffuse soft tissue swelling over lateral malleolus and  degenerative change as described.    2. Left side with moderate soft tissue swelling over lateral malleolus and  degenerative change as described.    3. No definite radiographic evidence of acute osseous abnormality.                  I have reviewed the patient's current medications.     Assessment/Plan     Active Hospital Problems    Diagnosis     **Septic joint     Septic arthritis        Plan:    Septic arthritis, right ankle: Podiatry consult appreciated.  Aspirate and blood cultures pending.  Continue IV Rocephin, Zyvox, antiemetics and pain control.  Diabetes mellitus, type II: Continue SSI.  Hypothyroidism: Continue home Synthroid.  Hypertension: Continue home losartan.  Hyperlipidemia: Continue home statin.  Diabetic neuropathy: Continue home Lyrica.  Rheumatoid arthritis: Continue home prednisone.  Hyponatremia: Plasma osmo normal.  Continue glucose control.     VTE PPX:  Lovenox.     Medical Decision Making  Number and Complexity of problems: 8/High  Differential Diagnosis: N/A    Conditions and Status:        Condition is  improving.     [facility] Data  External documents reviewed: Yes  My EKG interpretation: n/a  My US interpretation: As per radiology   Tests considered but not ordered: None     Decision rules/scores evaluated (example [id number], Wells, etc): N/A     Discussed with: Patient     Treatment Plan  As above    Care Planning  Shared decision making: Yes  Code status and discussions: Full    Disposition  Social Determinants of Health that impact treatment or disposition: None  I expect the patient to be discharged to home in 1-2 days.     I confirmed that the patient's Advance Care Plan is present, code status is documented, or surrogate decision maker is listed in the patient's medical record.      I have utilized all available immediate resources to obtain, update, or review the patient's current medications.         This document has been electronically signed by VY Diego on September 9, 2023 17:02 CDT

## 2023-09-09 NOTE — PLAN OF CARE
Goal Outcome Evaluation:  Plan of Care Reviewed With: patient        Progress: no change  Outcome Evaluation: vss, pain increasing, prn pain medication given-may have to increase, will continue to monitor, MRI this AM, aspiration of right ankle at bedside by Dr. Baker, diet reordered, emesis x 1-drank whole cup of coffee quickly on empty stomach-zofran given and effective, using urinal, possible surgery tomorrow.

## 2023-09-10 ENCOUNTER — ANESTHESIA (OUTPATIENT)
Dept: TELEMETRY | Facility: HOSPITAL | Age: 68
End: 2023-09-10

## 2023-09-10 LAB
25(OH)D3 SERPL-MCNC: 28.5 NG/ML (ref 30–100)
ALBUMIN SERPL-MCNC: 3.3 G/DL (ref 3.5–5.2)
ALBUMIN/GLOB SERPL: 0.8 G/DL
ALP SERPL-CCNC: 60 U/L (ref 39–117)
ALT SERPL W P-5'-P-CCNC: 10 U/L (ref 1–41)
ANION GAP SERPL CALCULATED.3IONS-SCNC: 10 MMOL/L (ref 5–15)
ANION GAP SERPL CALCULATED.3IONS-SCNC: 9 MMOL/L (ref 5–15)
AST SERPL-CCNC: 9 U/L (ref 1–40)
BILIRUB SERPL-MCNC: 0.3 MG/DL (ref 0–1.2)
BUN SERPL-MCNC: 15 MG/DL (ref 8–23)
BUN SERPL-MCNC: 17 MG/DL (ref 8–23)
BUN/CREAT SERPL: 14.9 (ref 7–25)
BUN/CREAT SERPL: 17 (ref 7–25)
CALCIUM SPEC-SCNC: 8.2 MG/DL (ref 8.6–10.5)
CALCIUM SPEC-SCNC: 8.6 MG/DL (ref 8.6–10.5)
CHLORIDE SERPL-SCNC: 97 MMOL/L (ref 98–107)
CHLORIDE SERPL-SCNC: 98 MMOL/L (ref 98–107)
CO2 SERPL-SCNC: 22 MMOL/L (ref 22–29)
CO2 SERPL-SCNC: 23 MMOL/L (ref 22–29)
CREAT SERPL-MCNC: 1 MG/DL (ref 0.76–1.27)
CREAT SERPL-MCNC: 1.01 MG/DL (ref 0.76–1.27)
DEPRECATED RDW RBC AUTO: 48.6 FL (ref 37–54)
EGFRCR SERPLBLD CKD-EPI 2021: 81.5 ML/MIN/1.73
EGFRCR SERPLBLD CKD-EPI 2021: 82.5 ML/MIN/1.73
ERYTHROCYTE [DISTWIDTH] IN BLOOD BY AUTOMATED COUNT: 16.5 % (ref 12.3–15.4)
GLOBULIN UR ELPH-MCNC: 4.1 GM/DL
GLUCOSE BLDC GLUCOMTR-MCNC: 224 MG/DL (ref 70–130)
GLUCOSE BLDC GLUCOMTR-MCNC: 234 MG/DL (ref 70–130)
GLUCOSE BLDC GLUCOMTR-MCNC: 324 MG/DL (ref 70–130)
GLUCOSE SERPL-MCNC: 163 MG/DL (ref 65–99)
GLUCOSE SERPL-MCNC: 240 MG/DL (ref 65–99)
HBA1C MFR BLD: 7 % (ref 4.8–5.6)
HCT VFR BLD AUTO: 28.6 % (ref 37.5–51)
HGB BLD-MCNC: 9.5 G/DL (ref 13–17.7)
MAGNESIUM SERPL-MCNC: 2.3 MG/DL (ref 1.6–2.4)
MCH RBC QN AUTO: 26.8 PG (ref 26.6–33)
MCHC RBC AUTO-ENTMCNC: 33.2 G/DL (ref 31.5–35.7)
MCV RBC AUTO: 80.6 FL (ref 79–97)
PLATELET # BLD AUTO: 327 10*3/MM3 (ref 140–450)
PMV BLD AUTO: 9.6 FL (ref 6–12)
POTASSIUM SERPL-SCNC: 4.1 MMOL/L (ref 3.5–5.2)
POTASSIUM SERPL-SCNC: 4.1 MMOL/L (ref 3.5–5.2)
PROT SERPL-MCNC: 7.4 G/DL (ref 6–8.5)
RBC # BLD AUTO: 3.55 10*6/MM3 (ref 4.14–5.8)
SODIUM SERPL-SCNC: 129 MMOL/L (ref 136–145)
SODIUM SERPL-SCNC: 130 MMOL/L (ref 136–145)
URATE SERPL-MCNC: 4.4 MG/DL (ref 3.4–7)
WBC NRBC COR # BLD: 11.1 10*3/MM3 (ref 3.4–10.8)

## 2023-09-10 PROCEDURE — 25010000002 CEFTRIAXONE PER 250 MG: Performed by: INTERNAL MEDICINE

## 2023-09-10 PROCEDURE — 25010000002 LINEZOLID 600 MG/300ML SOLUTION: Performed by: INTERNAL MEDICINE

## 2023-09-10 PROCEDURE — 83036 HEMOGLOBIN GLYCOSYLATED A1C: CPT | Performed by: INTERNAL MEDICINE

## 2023-09-10 PROCEDURE — 63710000001 INSULIN ASPART PER 5 UNITS: Performed by: NURSE PRACTITIONER

## 2023-09-10 PROCEDURE — 85027 COMPLETE CBC AUTOMATED: CPT | Performed by: NURSE PRACTITIONER

## 2023-09-10 PROCEDURE — 84550 ASSAY OF BLOOD/URIC ACID: CPT | Performed by: INTERNAL MEDICINE

## 2023-09-10 PROCEDURE — 25010000002 MORPHINE PER 10 MG: Performed by: NURSE PRACTITIONER

## 2023-09-10 PROCEDURE — 99231 SBSQ HOSP IP/OBS SF/LOW 25: CPT | Performed by: PODIATRIST

## 2023-09-10 PROCEDURE — 36415 COLL VENOUS BLD VENIPUNCTURE: CPT | Performed by: INTERNAL MEDICINE

## 2023-09-10 PROCEDURE — 82306 VITAMIN D 25 HYDROXY: CPT | Performed by: INTERNAL MEDICINE

## 2023-09-10 PROCEDURE — 83735 ASSAY OF MAGNESIUM: CPT | Performed by: INTERNAL MEDICINE

## 2023-09-10 PROCEDURE — 82948 REAGENT STRIP/BLOOD GLUCOSE: CPT

## 2023-09-10 PROCEDURE — 25010000002 ENOXAPARIN PER 10 MG: Performed by: INTERNAL MEDICINE

## 2023-09-10 PROCEDURE — 80053 COMPREHEN METABOLIC PANEL: CPT | Performed by: INTERNAL MEDICINE

## 2023-09-10 PROCEDURE — 63710000001 PREDNISONE PER 5 MG: Performed by: INTERNAL MEDICINE

## 2023-09-10 RX ORDER — INSULIN ASPART 100 [IU]/ML
0-24 INJECTION, SOLUTION INTRAVENOUS; SUBCUTANEOUS
Status: DISCONTINUED | OUTPATIENT
Start: 2023-09-10 | End: 2023-09-12

## 2023-09-10 RX ORDER — CYCLOBENZAPRINE HCL 10 MG
10 TABLET ORAL 3 TIMES DAILY PRN
Status: DISCONTINUED | OUTPATIENT
Start: 2023-09-10 | End: 2023-09-12 | Stop reason: HOSPADM

## 2023-09-10 RX ORDER — OXYCODONE HYDROCHLORIDE AND ACETAMINOPHEN 5; 325 MG/1; MG/1
1 TABLET ORAL EVERY 4 HOURS PRN
Status: DISCONTINUED | OUTPATIENT
Start: 2023-09-10 | End: 2023-09-12

## 2023-09-10 RX ADMIN — LINEZOLID 600 MG: 600 INJECTION, SOLUTION INTRAVENOUS at 20:09

## 2023-09-10 RX ADMIN — HYDROCODONE BITARTRATE AND ACETAMINOPHEN 1 TABLET: 5; 325 TABLET ORAL at 01:04

## 2023-09-10 RX ADMIN — INSULIN ASPART 8 UNITS: 100 INJECTION, SOLUTION INTRAVENOUS; SUBCUTANEOUS at 17:05

## 2023-09-10 RX ADMIN — PREDNISONE 10 MG: 10 TABLET ORAL at 20:09

## 2023-09-10 RX ADMIN — CEFTRIAXONE SODIUM 2000 MG: 2 INJECTION, POWDER, FOR SOLUTION INTRAMUSCULAR; INTRAVENOUS at 08:10

## 2023-09-10 RX ADMIN — NICOTINE 1 PATCH: 21 PATCH, EXTENDED RELEASE TRANSDERMAL at 08:11

## 2023-09-10 RX ADMIN — MORPHINE SULFATE 4 MG: 4 INJECTION, SOLUTION INTRAMUSCULAR; INTRAVENOUS at 03:23

## 2023-09-10 RX ADMIN — LEVOTHYROXINE SODIUM 200 MCG: 100 TABLET ORAL at 08:11

## 2023-09-10 RX ADMIN — DOCUSATE SODIUM 50 MG AND SENNOSIDES 8.6 MG 2 TABLET: 8.6; 5 TABLET, FILM COATED ORAL at 08:11

## 2023-09-10 RX ADMIN — MORPHINE SULFATE 4 MG: 4 INJECTION, SOLUTION INTRAMUSCULAR; INTRAVENOUS at 08:34

## 2023-09-10 RX ADMIN — Medication 1 G: at 08:11

## 2023-09-10 RX ADMIN — LOSARTAN POTASSIUM 100 MG: 50 TABLET, FILM COATED ORAL at 08:11

## 2023-09-10 RX ADMIN — PREGABALIN 150 MG: 75 CAPSULE ORAL at 08:11

## 2023-09-10 RX ADMIN — PRAVASTATIN SODIUM 40 MG: 20 TABLET ORAL at 20:09

## 2023-09-10 RX ADMIN — PREDNISONE 10 MG: 10 TABLET ORAL at 08:11

## 2023-09-10 RX ADMIN — Medication 10 ML: at 20:09

## 2023-09-10 RX ADMIN — Medication 10 ML: at 08:12

## 2023-09-10 RX ADMIN — FAMOTIDINE 40 MG: 40 TABLET, FILM COATED ORAL at 08:11

## 2023-09-10 RX ADMIN — MORPHINE SULFATE 4 MG: 4 INJECTION, SOLUTION INTRAMUSCULAR; INTRAVENOUS at 15:37

## 2023-09-10 RX ADMIN — ENOXAPARIN SODIUM 40 MG: 40 INJECTION SUBCUTANEOUS at 08:10

## 2023-09-10 RX ADMIN — OXYCODONE HYDROCHLORIDE AND ACETAMINOPHEN 1 TABLET: 5; 325 TABLET ORAL at 21:09

## 2023-09-10 RX ADMIN — INSULIN ASPART 4 UNITS: 100 INJECTION, SOLUTION INTRAVENOUS; SUBCUTANEOUS at 08:10

## 2023-09-10 RX ADMIN — PREGABALIN 150 MG: 75 CAPSULE ORAL at 20:09

## 2023-09-10 RX ADMIN — LINEZOLID 600 MG: 600 INJECTION, SOLUTION INTRAVENOUS at 08:10

## 2023-09-10 RX ADMIN — MORPHINE SULFATE 4 MG: 4 INJECTION, SOLUTION INTRAMUSCULAR; INTRAVENOUS at 20:06

## 2023-09-10 RX ADMIN — Medication 1 G: at 20:09

## 2023-09-10 RX ADMIN — Medication 2000 UNITS: at 08:11

## 2023-09-10 RX ADMIN — INSULIN ASPART 7 UNITS: 100 INJECTION, SOLUTION INTRAVENOUS; SUBCUTANEOUS at 11:18

## 2023-09-10 RX ADMIN — BISACODYL 10 MG: 10 SUPPOSITORY RECTAL at 20:09

## 2023-09-10 NOTE — PROGRESS NOTES
Podiatric Surgery Progress Note    Subjective     Patient resting comfortably.  No acute distress.    Objective     Vital signs in last 24 hours:  Temp:  [97.5 °F (36.4 °C)-98.7 °F (37.1 °C)] 98.1 °F (36.7 °C)  Heart Rate:  [66-83] 77  Resp:  [16-18] 16  BP: (108-176)/(58-79) 171/79    General: alert, appears stated age, and cooperative   Neurovascular: SILT  Capillary refill: Normal   Wound: No open wound.  Improving erythema and edema right lower extremity.   Range of Motion: Decreased with pain   DVT Exam: No evidence of DVT seen on physical exam.     Data Review  CBC:  Results from last 7 days   Lab Units 09/09/23  0151   WBC 10*3/mm3 14.49*   RBC 10*6/mm3 4.37   HEMOGLOBIN g/dL 11.6*   HEMATOCRIT % 35.3*   PLATELETS 10*3/mm3 384       Assessment & Plan     Cellulitis right lower extremity    Patient doing fairly well.  Aspiration with no organisms.  MRI not overly impressive for septic arthritis.  Plan to continue IV antibiotics.  We will monitor over the next few days for improvement.  No surgical intervention planned today.  Add Flexeril for muscle spasms.  All his questions were answered.  Call with questions.     LOS: 1 day     Barrington Baker DPM    Date: 9/10/2023  Time: 07:07 CDT

## 2023-09-10 NOTE — PLAN OF CARE
Goal Outcome Evaluation:           Progress: no change  Outcome Evaluation: Patient continues to ask for prn morphine iv with relief. Norco given. NPO 0001 for possible washout procedure with MD Baker. Patient is up to bathroom and uses the urinal. Patient is constipated. Given with miralax tonight per pt request. Right leg elevated with pillow. Bath with chlorhexedine done. VSS

## 2023-09-10 NOTE — PROGRESS NOTES
[facility] Medicine Services  INPATIENT PROGRESS NOTE    Length of Stay: 1  Date of Admission: 9/9/2023  Primary Care Physician: Talisha Francois APRN    Subjective   Chief Complaint: Right ankle pain.     HPI: This is a 68-year-old male with past medical history of rheumatoid arthritis (on prednisone 20 mg a day for a long time), diabetes mellitus type 2, hypertension, hypothyroidism, peripheral neuropathy, posttraumatic stress disorder, coronary artery disease and sleep apnea that presented to [facility] on 9/9/2023 with complaints of right ankle pain.    The patient was admitted for possible septic arthritis of the right ankle.  He was evaluated by Dr. Baker with podiatry and the right ankle was tapped with 3 cc of fluid aspirated and sent for evaluation.       Review of Systems   Constitutional:  Negative for activity change and fatigue.   HENT:  Negative for ear pain and sore throat.    Eyes:  Negative for pain and discharge.   Respiratory:  Negative for cough and shortness of breath.    Cardiovascular:  Negative for chest pain and palpitations.   Gastrointestinal:  Negative for abdominal pain and nausea.   Endocrine: Negative for cold intolerance and heat intolerance.   Genitourinary:  Negative for difficulty urinating and dysuria.   Musculoskeletal:  Positive for arthralgias and joint swelling. Negative for gait problem.   Skin:  Negative for color change and rash.   Neurological:  Negative for dizziness and weakness.   Psychiatric/Behavioral:  Negative for agitation and confusion.         Objective    Temp:  [97.5 °F (36.4 °C)-98.7 °F (37.1 °C)] 98.2 °F (36.8 °C)  Heart Rate:  [66-77] 72  Resp:  [16-18] 18  BP: (108-177)/(58-82) 177/82    Physical Exam  Constitutional:       Appearance: He is well-developed.   HENT:      Head: Normocephalic and atraumatic.   Eyes:      Pupils: Pupils are equal, round, and reactive to light.   Cardiovascular:      Rate and Rhythm: Normal rate and  regular rhythm.   Pulmonary:      Effort: Pulmonary effort is normal.      Breath sounds: Normal breath sounds.   Abdominal:      General: Bowel sounds are normal.      Palpations: Abdomen is soft.   Musculoskeletal:         General: Normal range of motion.      Cervical back: Normal range of motion and neck supple.   Skin:     General: Skin is warm and dry.   Neurological:      Mental Status: He is alert and oriented to person, place, and time.   Psychiatric:         Behavior: Behavior normal.     Results Review:  I have reviewed the labs, radiology results, and diagnostic studies.    Laboratory Data:   Results from last 7 days   Lab Units 09/10/23  0555 09/10/23  0006 09/09/23  1757 09/09/23  0653 09/09/23  0151   SODIUM mmol/L 129* 130* 129*   < > 127*   POTASSIUM mmol/L 4.1 4.1 4.3   < > 3.8   CHLORIDE mmol/L 97* 98 96*   < > 92*   CO2 mmol/L 22.0 23.0 24.0   < > 22.0   BUN mg/dL 15 17 15   < > 15   CREATININE mg/dL 1.01 1.00 1.07   < > 1.02   GLUCOSE mg/dL 240* 163* 227*   < > 174*   CALCIUM mg/dL 8.6 8.2* 8.8   < > 9.2   BILIRUBIN mg/dL 0.3  --   --   --  0.7   ALK PHOS U/L 60  --   --   --  64   ALT (SGPT) U/L 10  --   --   --  14   AST (SGOT) U/L 9  --   --   --  14   ANION GAP mmol/L 10.0 9.0 9.0   < > 13.0    < > = values in this interval not displayed.       Estimated Creatinine Clearance: 85.6 mL/min (by C-G formula based on SCr of 1.01 mg/dL).  Results from last 7 days   Lab Units 09/10/23  0555   MAGNESIUM mg/dL 2.3     Results from last 7 days   Lab Units 09/10/23  0555 09/09/23  0151   URIC ACID mg/dL 4.4 4.7       Results from last 7 days   Lab Units 09/10/23  0555 09/09/23  0151   WBC 10*3/mm3 11.10* 14.49*   HEMOGLOBIN g/dL 9.5* 11.6*   HEMATOCRIT % 28.6* 35.3*   PLATELETS 10*3/mm3 327 384             Culture Data:   Blood Culture   Date Value Ref Range Status   09/09/2023 No growth at 24 hours  Preliminary   09/09/2023 No growth at 24 hours  Preliminary     No results found for: URINECX  No  results found for: RESPCX  No results found for: WOUNDCX  No results found for: STOOLCX  No components found for: BODYFLD    Radiology Data:   Imaging Results (Last 24 Hours)       ** No results found for the last 24 hours. **            I have reviewed the patient's current medications.     Assessment/Plan     Active Hospital Problems    Diagnosis     **Septic joint     Septic arthritis        Plan:    Septic arthritis, right ankle: Podiatry consult appreciated.  Aspirate and blood cultures pending.  Continue IV Rocephin, Zyvox, antiemetics and pain control.  Diabetes mellitus, type II: SSI increased for tighter control.  Hypothyroidism: Continue home Synthroid.  Hypertension: Continue home losartan.  Hyperlipidemia: Continue home statin.  Diabetic neuropathy: Continue home Lyrica.  Rheumatoid arthritis: Continue home prednisone.  Hyponatremia, improved: Plasma osmo normal.  Continue glucose control.     VTE PPX:  Lovenox.     Medical Decision Making  Number and Complexity of problems: 8/High  Differential Diagnosis: N/A    Conditions and Status:        Condition is improving.     MDM Data  External documents reviewed: Yes  My EKG interpretation: n/a  My US interpretation: As per radiology   Tests considered but not ordered: None     Decision rules/scores evaluated (example [id number], Wells, etc): N/A     Discussed with: Patient     Treatment Plan  As above    Care Planning  Shared decision making: Yes  Code status and discussions: Full    Disposition  Social Determinants of Health that impact treatment or disposition: None  I expect the patient to be discharged to home in 1-2 days.     I confirmed that the patient's Advance Care Plan is present, code status is documented, or surrogate decision maker is listed in the patient's medical record.      I have utilized all available immediate resources to obtain, update, or review the patient's current medications.         This document has been electronically signed  by VY Diego on September 10, 2023 12:13 CDT

## 2023-09-10 NOTE — PLAN OF CARE
Goal Outcome Evaluation:  Plan of Care Reviewed With: patient        Progress: no change  Outcome Evaluation: Patient has asked for prn morphine today for right leg pain.  Patient is aware that Baker has cancelled washout procedure today.  Patient has rested today and watched t.v.  No s/s of distress noted.  Will continue to monitor.

## 2023-09-11 ENCOUNTER — APPOINTMENT (OUTPATIENT)
Dept: ULTRASOUND IMAGING | Facility: HOSPITAL | Age: 68
DRG: 638 | End: 2023-09-11
Payer: MEDICARE

## 2023-09-11 ENCOUNTER — APPOINTMENT (OUTPATIENT)
Dept: INTERVENTIONAL RADIOLOGY/VASCULAR | Facility: HOSPITAL | Age: 68
DRG: 638 | End: 2023-09-11
Payer: MEDICARE

## 2023-09-11 LAB
ALBUMIN SERPL-MCNC: 4.1 G/DL (ref 3.5–5.2)
ALBUMIN/GLOB SERPL: 0.8 G/DL
ALP SERPL-CCNC: 84 U/L (ref 39–117)
ALT SERPL W P-5'-P-CCNC: 15 U/L (ref 1–41)
ANION GAP SERPL CALCULATED.3IONS-SCNC: 11 MMOL/L (ref 5–15)
AST SERPL-CCNC: 12 U/L (ref 1–40)
BILIRUB SERPL-MCNC: 0.3 MG/DL (ref 0–1.2)
BUN SERPL-MCNC: 16 MG/DL (ref 8–23)
BUN/CREAT SERPL: 15.8 (ref 7–25)
CALCIUM SPEC-SCNC: 9.2 MG/DL (ref 8.6–10.5)
CHLORIDE SERPL-SCNC: 95 MMOL/L (ref 98–107)
CO2 SERPL-SCNC: 26 MMOL/L (ref 22–29)
CREAT SERPL-MCNC: 1.01 MG/DL (ref 0.76–1.27)
DEPRECATED RDW RBC AUTO: 49.1 FL (ref 37–54)
EGFRCR SERPLBLD CKD-EPI 2021: 81.5 ML/MIN/1.73
ERYTHROCYTE [DISTWIDTH] IN BLOOD BY AUTOMATED COUNT: 16.3 % (ref 12.3–15.4)
GLOBULIN UR ELPH-MCNC: 4.9 GM/DL
GLUCOSE BLDC GLUCOMTR-MCNC: 230 MG/DL (ref 70–130)
GLUCOSE BLDC GLUCOMTR-MCNC: 260 MG/DL (ref 70–130)
GLUCOSE BLDC GLUCOMTR-MCNC: 271 MG/DL (ref 70–130)
GLUCOSE BLDC GLUCOMTR-MCNC: 275 MG/DL (ref 70–130)
GLUCOSE SERPL-MCNC: 232 MG/DL (ref 65–99)
HCT VFR BLD AUTO: 34.4 % (ref 37.5–51)
HGB BLD-MCNC: 11 G/DL (ref 13–17.7)
MCH RBC QN AUTO: 26.2 PG (ref 26.6–33)
MCHC RBC AUTO-ENTMCNC: 32 G/DL (ref 31.5–35.7)
MCV RBC AUTO: 81.9 FL (ref 79–97)
PLATELET # BLD AUTO: 440 10*3/MM3 (ref 140–450)
PMV BLD AUTO: 9.4 FL (ref 6–12)
POTASSIUM SERPL-SCNC: 4.3 MMOL/L (ref 3.5–5.2)
PROT SERPL-MCNC: 9 G/DL (ref 6–8.5)
RBC # BLD AUTO: 4.2 10*6/MM3 (ref 4.14–5.8)
SODIUM SERPL-SCNC: 132 MMOL/L (ref 136–145)
WBC NRBC COR # BLD: 12.49 10*3/MM3 (ref 3.4–10.8)

## 2023-09-11 PROCEDURE — 80053 COMPREHEN METABOLIC PANEL: CPT | Performed by: NURSE PRACTITIONER

## 2023-09-11 PROCEDURE — 36410 VNPNXR 3YR/> PHY/QHP DX/THER: CPT

## 2023-09-11 PROCEDURE — 25010000002 LINEZOLID 600 MG/300ML SOLUTION: Performed by: INTERNAL MEDICINE

## 2023-09-11 PROCEDURE — 25010000002 CEFTRIAXONE PER 250 MG: Performed by: INTERNAL MEDICINE

## 2023-09-11 PROCEDURE — C1751 CATH, INF, PER/CENT/MIDLINE: HCPCS

## 2023-09-11 PROCEDURE — 82948 REAGENT STRIP/BLOOD GLUCOSE: CPT

## 2023-09-11 PROCEDURE — 76937 US GUIDE VASCULAR ACCESS: CPT

## 2023-09-11 PROCEDURE — 85027 COMPLETE CBC AUTOMATED: CPT | Performed by: NURSE PRACTITIONER

## 2023-09-11 PROCEDURE — 63710000001 PREDNISONE PER 5 MG: Performed by: INTERNAL MEDICINE

## 2023-09-11 PROCEDURE — 25010000002 ENOXAPARIN PER 10 MG: Performed by: INTERNAL MEDICINE

## 2023-09-11 PROCEDURE — 25010000002 HYDRALAZINE PER 20 MG: Performed by: NURSE PRACTITIONER

## 2023-09-11 PROCEDURE — 25010000002 MORPHINE PER 10 MG: Performed by: NURSE PRACTITIONER

## 2023-09-11 PROCEDURE — 63710000001 INSULIN DETEMIR PER 5 UNITS: Performed by: NURSE PRACTITIONER

## 2023-09-11 PROCEDURE — 63710000001 INSULIN ASPART PER 5 UNITS: Performed by: NURSE PRACTITIONER

## 2023-09-11 RX ORDER — SODIUM CHLORIDE 0.9 % (FLUSH) 0.9 %
10 SYRINGE (ML) INJECTION AS NEEDED
Status: DISCONTINUED | OUTPATIENT
Start: 2023-09-11 | End: 2023-09-12 | Stop reason: HOSPADM

## 2023-09-11 RX ORDER — SODIUM CHLORIDE 0.9 % (FLUSH) 0.9 %
10 SYRINGE (ML) INJECTION EVERY 12 HOURS SCHEDULED
Status: DISCONTINUED | OUTPATIENT
Start: 2023-09-11 | End: 2023-09-12 | Stop reason: HOSPADM

## 2023-09-11 RX ORDER — SODIUM CHLORIDE 0.9 % (FLUSH) 0.9 %
20 SYRINGE (ML) INJECTION AS NEEDED
Status: DISCONTINUED | OUTPATIENT
Start: 2023-09-11 | End: 2023-09-12 | Stop reason: HOSPADM

## 2023-09-11 RX ORDER — HYDRALAZINE HYDROCHLORIDE 20 MG/ML
20 INJECTION INTRAMUSCULAR; INTRAVENOUS EVERY 6 HOURS PRN
Status: DISCONTINUED | OUTPATIENT
Start: 2023-09-11 | End: 2023-09-12 | Stop reason: HOSPADM

## 2023-09-11 RX ADMIN — MORPHINE SULFATE 4 MG: 4 INJECTION, SOLUTION INTRAMUSCULAR; INTRAVENOUS at 21:24

## 2023-09-11 RX ADMIN — PREDNISONE 10 MG: 10 TABLET ORAL at 08:07

## 2023-09-11 RX ADMIN — HYDRALAZINE HYDROCHLORIDE 20 MG: 20 INJECTION INTRAMUSCULAR; INTRAVENOUS at 23:21

## 2023-09-11 RX ADMIN — PREGABALIN 150 MG: 75 CAPSULE ORAL at 21:07

## 2023-09-11 RX ADMIN — BISACODYL 10 MG: 10 SUPPOSITORY RECTAL at 09:58

## 2023-09-11 RX ADMIN — INSULIN ASPART 8 UNITS: 100 INJECTION, SOLUTION INTRAVENOUS; SUBCUTANEOUS at 11:08

## 2023-09-11 RX ADMIN — MORPHINE SULFATE 4 MG: 4 INJECTION, SOLUTION INTRAMUSCULAR; INTRAVENOUS at 08:21

## 2023-09-11 RX ADMIN — OXYCODONE HYDROCHLORIDE AND ACETAMINOPHEN 1 TABLET: 5; 325 TABLET ORAL at 23:26

## 2023-09-11 RX ADMIN — NICOTINE 1 PATCH: 21 PATCH, EXTENDED RELEASE TRANSDERMAL at 08:09

## 2023-09-11 RX ADMIN — CEFTRIAXONE SODIUM 2000 MG: 2 INJECTION, POWDER, FOR SOLUTION INTRAMUSCULAR; INTRAVENOUS at 08:09

## 2023-09-11 RX ADMIN — ENOXAPARIN SODIUM 40 MG: 40 INJECTION SUBCUTANEOUS at 08:08

## 2023-09-11 RX ADMIN — CYCLOBENZAPRINE HYDROCHLORIDE 10 MG: 10 TABLET, FILM COATED ORAL at 14:25

## 2023-09-11 RX ADMIN — HYDRALAZINE HYDROCHLORIDE 20 MG: 20 INJECTION INTRAMUSCULAR; INTRAVENOUS at 08:50

## 2023-09-11 RX ADMIN — Medication 10 ML: at 21:08

## 2023-09-11 RX ADMIN — LOSARTAN POTASSIUM 100 MG: 50 TABLET, FILM COATED ORAL at 08:05

## 2023-09-11 RX ADMIN — MORPHINE SULFATE 4 MG: 4 INJECTION, SOLUTION INTRAMUSCULAR; INTRAVENOUS at 03:45

## 2023-09-11 RX ADMIN — INSULIN ASPART 12 UNITS: 100 INJECTION, SOLUTION INTRAVENOUS; SUBCUTANEOUS at 08:09

## 2023-09-11 RX ADMIN — Medication 10 ML: at 22:33

## 2023-09-11 RX ADMIN — LINEZOLID 600 MG: 600 INJECTION, SOLUTION INTRAVENOUS at 08:30

## 2023-09-11 RX ADMIN — Medication 2000 UNITS: at 08:05

## 2023-09-11 RX ADMIN — PRAVASTATIN SODIUM 40 MG: 20 TABLET ORAL at 21:08

## 2023-09-11 RX ADMIN — DOCUSATE SODIUM 50 MG AND SENNOSIDES 8.6 MG 2 TABLET: 8.6; 5 TABLET, FILM COATED ORAL at 08:07

## 2023-09-11 RX ADMIN — LEVOTHYROXINE SODIUM 200 MCG: 100 TABLET ORAL at 08:07

## 2023-09-11 RX ADMIN — DOCUSATE SODIUM 50 MG AND SENNOSIDES 8.6 MG 2 TABLET: 8.6; 5 TABLET, FILM COATED ORAL at 21:07

## 2023-09-11 RX ADMIN — PREDNISONE 10 MG: 10 TABLET ORAL at 21:07

## 2023-09-11 RX ADMIN — FAMOTIDINE 40 MG: 40 TABLET, FILM COATED ORAL at 08:06

## 2023-09-11 RX ADMIN — INSULIN ASPART 12 UNITS: 100 INJECTION, SOLUTION INTRAVENOUS; SUBCUTANEOUS at 18:30

## 2023-09-11 RX ADMIN — INSULIN DETEMIR 5 UNITS: 100 INJECTION, SOLUTION SUBCUTANEOUS at 11:09

## 2023-09-11 RX ADMIN — Medication 1 G: at 21:08

## 2023-09-11 RX ADMIN — Medication 10 ML: at 08:00

## 2023-09-11 RX ADMIN — Medication 1 G: at 08:07

## 2023-09-11 RX ADMIN — HYDRALAZINE HYDROCHLORIDE 20 MG: 20 INJECTION INTRAMUSCULAR; INTRAVENOUS at 17:44

## 2023-09-11 RX ADMIN — MORPHINE SULFATE 4 MG: 4 INJECTION, SOLUTION INTRAMUSCULAR; INTRAVENOUS at 13:44

## 2023-09-11 RX ADMIN — PREGABALIN 150 MG: 75 CAPSULE ORAL at 08:09

## 2023-09-11 RX ADMIN — INSULIN DETEMIR 5 UNITS: 100 INJECTION, SOLUTION SUBCUTANEOUS at 21:25

## 2023-09-11 RX ADMIN — LINEZOLID 600 MG: 600 INJECTION, SOLUTION INTRAVENOUS at 21:08

## 2023-09-11 NOTE — PLAN OF CARE
Goal Outcome Evaluation:           Progress: improving  Outcome Evaluation: Peyman's pain is under control. Continues to ask for morphine iv and percocet for right foot/ankle pain. Swelling to right foot and right ankle has improved. SBA to toilet and steady. Blood pressure and blood sugar monitored. Urine output adequate. IV antibiotic given. VSS. Given with dulcolax suppository. BM yesterday.

## 2023-09-11 NOTE — PROGRESS NOTES
[facility] Medicine Services  INPATIENT PROGRESS NOTE    Length of Stay: 2  Date of Admission: 9/9/2023  Primary Care Physician: Talisha Francois APRN    Subjective   Chief Complaint: Right ankle pain.     HPI: This is a 68-year-old male with past medical history of rheumatoid arthritis (on prednisone 20 mg a day for a long time), diabetes mellitus type 2, hypertension, hypothyroidism, peripheral neuropathy, posttraumatic stress disorder, coronary artery disease and sleep apnea that presented to [facility] on 9/9/2023 with complaints of right ankle pain.    The patient was admitted for possible septic arthritis of the right ankle.  He was evaluated by Dr. Baker with podiatry and the right ankle was tapped with 3 cc of fluid aspirated and sent for evaluation.       Review of Systems   Constitutional:  Negative for activity change and fatigue.   HENT:  Negative for ear pain and sore throat.    Eyes:  Negative for pain and discharge.   Respiratory:  Negative for cough and shortness of breath.    Cardiovascular:  Negative for chest pain and palpitations.   Gastrointestinal:  Negative for abdominal pain and nausea.   Endocrine: Negative for cold intolerance and heat intolerance.   Genitourinary:  Negative for difficulty urinating and dysuria.   Musculoskeletal:  Positive for arthralgias and joint swelling. Negative for gait problem.   Skin:  Negative for color change and rash.   Neurological:  Negative for dizziness and weakness.   Psychiatric/Behavioral:  Negative for agitation and confusion.         Objective    Temp:  [97.3 °F (36.3 °C)-98.5 °F (36.9 °C)] 97.3 °F (36.3 °C)  Heart Rate:  [70-87] 87  Resp:  [18-20] 18  BP: (160-217)/(78-98) 164/82    Physical Exam  Constitutional:       Appearance: He is well-developed.   HENT:      Head: Normocephalic and atraumatic.   Eyes:      Pupils: Pupils are equal, round, and reactive to light.   Cardiovascular:      Rate and Rhythm: Normal rate and  regular rhythm.   Pulmonary:      Effort: Pulmonary effort is normal.      Breath sounds: Normal breath sounds.   Abdominal:      General: Bowel sounds are normal.      Palpations: Abdomen is soft.   Musculoskeletal:         General: Normal range of motion.      Cervical back: Normal range of motion and neck supple.   Skin:     General: Skin is warm and dry.   Neurological:      Mental Status: He is alert and oriented to person, place, and time.   Psychiatric:         Behavior: Behavior normal.     Results Review:  I have reviewed the labs, radiology results, and diagnostic studies.    Laboratory Data:   Results from last 7 days   Lab Units 09/11/23  0702 09/10/23  0555 09/10/23  0006 09/09/23  0653 09/09/23  0151   SODIUM mmol/L 132* 129* 130*   < > 127*   POTASSIUM mmol/L 4.3 4.1 4.1   < > 3.8   CHLORIDE mmol/L 95* 97* 98   < > 92*   CO2 mmol/L 26.0 22.0 23.0   < > 22.0   BUN mg/dL 16 15 17   < > 15   CREATININE mg/dL 1.01 1.01 1.00   < > 1.02   GLUCOSE mg/dL 232* 240* 163*   < > 174*   CALCIUM mg/dL 9.2 8.6 8.2*   < > 9.2   BILIRUBIN mg/dL 0.3 0.3  --   --  0.7   ALK PHOS U/L 84 60  --   --  64   ALT (SGPT) U/L 15 10  --   --  14   AST (SGOT) U/L 12 9  --   --  14   ANION GAP mmol/L 11.0 10.0 9.0   < > 13.0    < > = values in this interval not displayed.       Estimated Creatinine Clearance: 88.8 mL/min (by C-G formula based on SCr of 1.01 mg/dL).  Results from last 7 days   Lab Units 09/10/23  0555   MAGNESIUM mg/dL 2.3       Results from last 7 days   Lab Units 09/10/23  0555 09/09/23  0151   URIC ACID mg/dL 4.4 4.7       Results from last 7 days   Lab Units 09/11/23  0702 09/10/23  0555 09/09/23  0151   WBC 10*3/mm3 12.49* 11.10* 14.49*   HEMOGLOBIN g/dL 11.0* 9.5* 11.6*   HEMATOCRIT % 34.4* 28.6* 35.3*   PLATELETS 10*3/mm3 440 327 384             Culture Data:   Blood Culture   Date Value Ref Range Status   09/09/2023 No growth at 2 days  Preliminary   09/09/2023 No growth at 2 days  Preliminary     No  results found for: URINECX  No results found for: RESPCX  No results found for: WOUNDCX  No results found for: STOOLCX  No components found for: BODYFLD    Radiology Data:   Imaging Results (Last 24 Hours)       ** No results found for the last 24 hours. **            I have reviewed the patient's current medications.     Assessment/Plan     Active Hospital Problems    Diagnosis     **Septic joint     Septic arthritis        Plan:    Septic arthritis, right ankle: Podiatry consult appreciated.  Aspirate and blood cultures pending.  Continue IV Rocephin, Zyvox, antiemetics and pain control.  Diabetes mellitus, type II: Continue SSI.  Long-acting started 5 units q 12 hours, will trend up as necessary.   Hypothyroidism: Continue home Synthroid.  Hypertension: Continue home losartan.  Hydralazine 20 mg q 6hrs prn SBP>170.  Hyperlipidemia: Continue home statin.  Diabetic neuropathy: Continue home Lyrica.  Rheumatoid arthritis: Continue home prednisone.  Hyponatremia, improved: 132 today. Plasma osmo normal.  Continue glucose control.     VTE PPX:  Lovenox.     Medical Decision Making  Number and Complexity of problems: 8/High  Differential Diagnosis: N/A    Conditions and Status:        Condition is improving.     [facility] Data  External documents reviewed: Yes  My EKG interpretation: n/a  My US interpretation: As per radiology   Tests considered but not ordered: None     Decision rules/scores evaluated (example [id number], Wells, etc): N/A     Discussed with: Patient     Treatment Plan  As above    Care Planning  Shared decision making: Yes  Code status and discussions: Full    Disposition  Social Determinants of Health that impact treatment or disposition: None  I expect the patient to be discharged to home in 1-2 days.     I confirmed that the patient's Advance Care Plan is present, code status is documented, or surrogate decision maker is listed in the patient's medical record.      I have utilized all available  immediate resources to obtain, update, or review the patient's current medications.         This document has been electronically signed by VY Diego on September 11, 2023 14:03 CDT          69

## 2023-09-11 NOTE — PLAN OF CARE
Goal Outcome Evaluation: Patient has been hypertensive at times today. Hydralazine was ordered and administered. Midline placed today.  Patient still c/o pain in the right ankle, but pain meds do help when given. No new concerns at this time. Will continue to monitor.

## 2023-09-11 NOTE — PROGRESS NOTES
TWO PATIENT IDENTIFIERS WERE USED. THE PATIENT WAS DRAPED WITH A FULL BODY DRAPE AND THE PATIENT'S LEFT ARM WAS PREPPED WITH CHLORA PREP. ULTRASOUND WAS USED TO LOCALIZE THELEFT BASILIC VEIN. SUBCUTANEOUS TISSUE AT THE CATHETER SITE WAS INFILTRATED WITH 2% LIDOCAINE. UNDER ULTRASOUND GUIDANCE, THE VEIN WAS ACCESSED WITH A 21 GAUGE  NEEDLE. AN 0.018 WIRE WAS THEN THREADED THROUGH THE NEEDLE. THE 21 GAUGE NEEDLE WAS REMOVED AND A 4 Wolof SHEATH WAS PLACED OVER THE WIRE INTO THE VEIN.THE MIDLINE CATHETER WAS TRIMMED TO 20CM. THE MIDLINE CATHETER WAS THEN PLACED OVER THE WIRE INTO THE VEIN, THE SHEATH WAS PEELED AWAY, WIRE WAS REMOVED. CATHETER WAS FLUSHED WITH NORMAL SALINE AND CATHETER TIP APPLIED. BIOPATCH PLACED. CATHETER SECURED WITH STAT LOCK AND TEGADERM. PATIENT TOLERATED PROCEDURE WELL. THIS WAS DONE IN THE ANGIOSUITE      IMPRESSION:SUCCESSFUL PLACEMENT OF DUAL LUMEN MIDLINE.           Savanna Garcia  9/11/2023  17:13 CDT

## 2023-09-12 ENCOUNTER — READMISSION MANAGEMENT (OUTPATIENT)
Dept: CALL CENTER | Facility: HOSPITAL | Age: 68
End: 2023-09-12
Payer: MEDICARE

## 2023-09-12 VITALS
TEMPERATURE: 97.9 F | DIASTOLIC BLOOD PRESSURE: 78 MMHG | HEIGHT: 72 IN | WEIGHT: 194 LBS | HEART RATE: 72 BPM | BODY MASS INDEX: 26.28 KG/M2 | RESPIRATION RATE: 18 BRPM | SYSTOLIC BLOOD PRESSURE: 169 MMHG | OXYGEN SATURATION: 98 %

## 2023-09-12 LAB
ALBUMIN SERPL-MCNC: 3.4 G/DL (ref 3.5–5.2)
ALBUMIN/GLOB SERPL: 0.8 G/DL
ALP SERPL-CCNC: 62 U/L (ref 39–117)
ALT SERPL W P-5'-P-CCNC: 12 U/L (ref 1–41)
ANION GAP SERPL CALCULATED.3IONS-SCNC: 11 MMOL/L (ref 5–15)
AST SERPL-CCNC: 9 U/L (ref 1–40)
BILIRUB SERPL-MCNC: 0.3 MG/DL (ref 0–1.2)
BUN SERPL-MCNC: 22 MG/DL (ref 8–23)
BUN/CREAT SERPL: 19.8 (ref 7–25)
CALCIUM SPEC-SCNC: 9.5 MG/DL (ref 8.6–10.5)
CHLORIDE SERPL-SCNC: 97 MMOL/L (ref 98–107)
CO2 SERPL-SCNC: 23 MMOL/L (ref 22–29)
CREAT SERPL-MCNC: 1.11 MG/DL (ref 0.76–1.27)
DEPRECATED RDW RBC AUTO: 47.8 FL (ref 37–54)
EGFRCR SERPLBLD CKD-EPI 2021: 72.8 ML/MIN/1.73
ERYTHROCYTE [DISTWIDTH] IN BLOOD BY AUTOMATED COUNT: 16.3 % (ref 12.3–15.4)
GLOBULIN UR ELPH-MCNC: 4.1 GM/DL
GLUCOSE BLDC GLUCOMTR-MCNC: 166 MG/DL (ref 70–130)
GLUCOSE BLDC GLUCOMTR-MCNC: 195 MG/DL (ref 70–130)
GLUCOSE BLDC GLUCOMTR-MCNC: 202 MG/DL (ref 70–130)
GLUCOSE BLDC GLUCOMTR-MCNC: 344 MG/DL (ref 70–130)
GLUCOSE SERPL-MCNC: 204 MG/DL (ref 65–99)
HCT VFR BLD AUTO: 29.1 % (ref 37.5–51)
HGB BLD-MCNC: 9.7 G/DL (ref 13–17.7)
MCH RBC QN AUTO: 26.8 PG (ref 26.6–33)
MCHC RBC AUTO-ENTMCNC: 33.3 G/DL (ref 31.5–35.7)
MCV RBC AUTO: 80.4 FL (ref 79–97)
PLATELET # BLD AUTO: 379 10*3/MM3 (ref 140–450)
PMV BLD AUTO: 9.3 FL (ref 6–12)
POTASSIUM SERPL-SCNC: 4.6 MMOL/L (ref 3.5–5.2)
PROT SERPL-MCNC: 7.5 G/DL (ref 6–8.5)
RBC # BLD AUTO: 3.62 10*6/MM3 (ref 4.14–5.8)
SODIUM SERPL-SCNC: 131 MMOL/L (ref 136–145)
WBC NRBC COR # BLD: 8.44 10*3/MM3 (ref 3.4–10.8)

## 2023-09-12 PROCEDURE — 25010000002 LINEZOLID 600 MG/300ML SOLUTION: Performed by: INTERNAL MEDICINE

## 2023-09-12 PROCEDURE — 25010000002 MORPHINE PER 10 MG: Performed by: NURSE PRACTITIONER

## 2023-09-12 PROCEDURE — 99231 SBSQ HOSP IP/OBS SF/LOW 25: CPT | Performed by: PODIATRIST

## 2023-09-12 PROCEDURE — 25010000002 CEFTRIAXONE PER 250 MG: Performed by: INTERNAL MEDICINE

## 2023-09-12 PROCEDURE — 80053 COMPREHEN METABOLIC PANEL: CPT | Performed by: NURSE PRACTITIONER

## 2023-09-12 PROCEDURE — 63710000001 PREDNISONE PER 5 MG: Performed by: INTERNAL MEDICINE

## 2023-09-12 PROCEDURE — 25010000002 ENOXAPARIN PER 10 MG: Performed by: INTERNAL MEDICINE

## 2023-09-12 PROCEDURE — 63710000001 INSULIN ASPART PER 5 UNITS: Performed by: NURSE PRACTITIONER

## 2023-09-12 PROCEDURE — 97162 PT EVAL MOD COMPLEX 30 MIN: CPT | Performed by: PHYSICAL THERAPIST

## 2023-09-12 PROCEDURE — 63710000001 INSULIN DETEMIR PER 5 UNITS: Performed by: NURSE PRACTITIONER

## 2023-09-12 PROCEDURE — 85027 COMPLETE CBC AUTOMATED: CPT | Performed by: NURSE PRACTITIONER

## 2023-09-12 PROCEDURE — 82948 REAGENT STRIP/BLOOD GLUCOSE: CPT

## 2023-09-12 RX ORDER — LINEZOLID 600 MG/1
600 TABLET, FILM COATED ORAL 2 TIMES DAILY
Qty: 6 TABLET | Refills: 0 | Status: SHIPPED | OUTPATIENT
Start: 2023-09-12 | End: 2023-09-15

## 2023-09-12 RX ORDER — SODIUM CHLORIDE 1 G/1
1 TABLET ORAL 2 TIMES DAILY
Qty: 60 TABLET | Refills: 0 | Status: SHIPPED | OUTPATIENT
Start: 2023-09-12

## 2023-09-12 RX ORDER — OXYCODONE AND ACETAMINOPHEN 7.5; 325 MG/1; MG/1
1 TABLET ORAL EVERY 6 HOURS PRN
Qty: 12 TABLET | Refills: 0 | Status: SHIPPED | OUTPATIENT
Start: 2023-09-12 | End: 2023-09-15

## 2023-09-12 RX ORDER — OXYCODONE AND ACETAMINOPHEN 7.5; 325 MG/1; MG/1
1 TABLET ORAL EVERY 4 HOURS PRN
Status: DISCONTINUED | OUTPATIENT
Start: 2023-09-12 | End: 2023-09-12 | Stop reason: HOSPADM

## 2023-09-12 RX ORDER — CEPHALEXIN 500 MG/1
500 CAPSULE ORAL 4 TIMES DAILY
Qty: 12 CAPSULE | Refills: 0 | Status: SHIPPED | OUTPATIENT
Start: 2023-09-12 | End: 2023-09-15

## 2023-09-12 RX ORDER — INSULIN ASPART 100 [IU]/ML
0-14 INJECTION, SOLUTION INTRAVENOUS; SUBCUTANEOUS
Status: DISCONTINUED | OUTPATIENT
Start: 2023-09-12 | End: 2023-09-12 | Stop reason: HOSPADM

## 2023-09-12 RX ADMIN — MORPHINE SULFATE 4 MG: 4 INJECTION, SOLUTION INTRAMUSCULAR; INTRAVENOUS at 11:20

## 2023-09-12 RX ADMIN — LEVOTHYROXINE SODIUM 200 MCG: 100 TABLET ORAL at 08:14

## 2023-09-12 RX ADMIN — Medication 2000 UNITS: at 08:14

## 2023-09-12 RX ADMIN — FAMOTIDINE 40 MG: 40 TABLET, FILM COATED ORAL at 08:14

## 2023-09-12 RX ADMIN — OXYCODONE HYDROCHLORIDE AND ACETAMINOPHEN 1 TABLET: 5; 325 TABLET ORAL at 09:11

## 2023-09-12 RX ADMIN — ENOXAPARIN SODIUM 40 MG: 40 INJECTION SUBCUTANEOUS at 08:15

## 2023-09-12 RX ADMIN — LINEZOLID 600 MG: 600 INJECTION, SOLUTION INTRAVENOUS at 09:12

## 2023-09-12 RX ADMIN — INSULIN ASPART 16 UNITS: 100 INJECTION, SOLUTION INTRAVENOUS; SUBCUTANEOUS at 11:20

## 2023-09-12 RX ADMIN — NICOTINE 1 PATCH: 21 PATCH, EXTENDED RELEASE TRANSDERMAL at 08:08

## 2023-09-12 RX ADMIN — Medication 1 G: at 08:14

## 2023-09-12 RX ADMIN — Medication 10 ML: at 08:15

## 2023-09-12 RX ADMIN — INSULIN ASPART 4 UNITS: 100 INJECTION, SOLUTION INTRAVENOUS; SUBCUTANEOUS at 08:10

## 2023-09-12 RX ADMIN — PREGABALIN 150 MG: 75 CAPSULE ORAL at 08:14

## 2023-09-12 RX ADMIN — DOCUSATE SODIUM 50 MG AND SENNOSIDES 8.6 MG 2 TABLET: 8.6; 5 TABLET, FILM COATED ORAL at 08:15

## 2023-09-12 RX ADMIN — PREDNISONE 10 MG: 10 TABLET ORAL at 08:15

## 2023-09-12 RX ADMIN — LOSARTAN POTASSIUM 100 MG: 50 TABLET, FILM COATED ORAL at 08:15

## 2023-09-12 RX ADMIN — CEFTRIAXONE SODIUM 2000 MG: 2 INJECTION, POWDER, FOR SOLUTION INTRAMUSCULAR; INTRAVENOUS at 08:11

## 2023-09-12 RX ADMIN — INSULIN DETEMIR 5 UNITS: 100 INJECTION, SOLUTION SUBCUTANEOUS at 08:08

## 2023-09-12 NOTE — THERAPY DISCHARGE NOTE
Patient Name: Andreas Billy  : 1955    MRN: [id number]                              Today's Date: 2023       Admit Date: 2023    Visit Dx:     ICD-10-CM ICD-9-CM   1. Septic arthritis of right ankle, due to unspecified organism  M00.9 711.07   2. Overweight with body mass index (BMI) of 25 to 25.9 in adult  E66.3 278.02    Z68.25 V85.21   3. Atherosclerosis of native arteries of extremities with intermittent claudication, bilateral legs  I70.213 440.21   4. Rectal bleeding  K62.5 569.3   5. PTSD (post-traumatic stress disorder)  F43.10 309.81   6. PVD (peripheral vascular disease)  I73.9 443.9   7. Controlled type 2 diabetes mellitus with diabetic polyneuropathy, without long-term current use of insulin  E11.42 250.60     357.2   8. Mixed hyperlipidemia  E78.2 272.2   9. Benign essential HTN  I10 401.1   10. Bilateral carotid artery stenosis  I65.23 433.10     433.30   11. Nicotine dependence, cigarettes, with other nicotine-induced disorders  F17.218 292.89     Patient Active Problem List   Diagnosis    Nicotine dependence, cigarettes, with other nicotine-induced disorders    Bilateral carotid artery stenosis    Benign essential HTN    Mixed hyperlipidemia    Controlled type 2 diabetes mellitus with diabetic polyneuropathy, without long-term current use of insulin    PVD (peripheral vascular disease)    PTSD (post-traumatic stress disorder)    Rectal bleeding    Atherosclerosis of native arteries of extremities with intermittent claudication, bilateral legs    Overweight with body mass index (BMI) of 25 to 25.9 in adult    Septic joint    Septic arthritis     Past Medical History:   Diagnosis Date    Abdominal pain     Acquired hypothyroidism     Anemia     Anxiety and depression     Arthritis     Coronary artery disease     Diabetes mellitus     Full dentures     Hashimoto's thyroiditis     Head injury     Hypertension     Peripheral neuropathy     PTSD (post-traumatic stress disorder)      Simple goiter     Sleep apnea     Wears glasses      Past Surgical History:   Procedure Laterality Date    CAROTID ENDARTERECTOMY Right 5/23/2018    Procedure: RIGHT CAROTID ENDARTERECTOMY carotid cerebral arteriogram     (C-ARM#2);  Surgeon: Bayron Griffin MD;  Location: [facility] OR;  Service: Vascular    CAROTID ENDARTERECTOMY Left 6/27/2018    Procedure: CAROTID ENDARTERECTOMY                          (C-ARM);  Surgeon: Bayron Griffin MD;  Location: [facility] OR;  Service: Vascular    CARPAL TUNNEL RELEASE Bilateral     COLON RESECTION      COLONOSCOPY N/A 6/19/2023    Procedure: COLONOSCOPY;  Surgeon: Emery Estrada MD;  Location: [facility] ENDOSCOPY;  Service: Gastroenterology;  Laterality: N/A;    ENDOSCOPY N/A 6/19/2023    Procedure: ESOPHAGOGASTRODUODENOSCOPY;  Surgeon: Emery Estrada MD;  Location: [facility] ENDOSCOPY;  Service: Gastroenterology;  Laterality: N/A;    SHOULDER ARTHROSCOPY Left     VASECTOMY        General Information       Row Name 09/12/23 1510          Physical Therapy Time and Intention    Document Type evaluation  -BS     Mode of Treatment individual therapy;physical therapy  -BS       Row Name 09/12/23 1510          General Information    Patient Profile Reviewed yes  -BS     Prior Level of Function independent:;community mobility;bed mobility;ADL's;dressing;bathing;feeding;grooming;driving;using stairs;yard work  -BS     Barriers to Rehab none identified  -BS       Row Name 09/12/23 1510          Living Environment    People in Home alone  -BS       Row Name 09/12/23 1510          Home Main Entrance    Number of Stairs, Main Entrance seven  -BS     Stair Railings, Main Entrance railings on both sides of stairs  -BS       Row Name 09/12/23 1510          Stairs Within Home, Primary    Stairs, Within Home, Primary one level home; on disability, DME: walker  -BS     Number of Stairs, Within Home, Primary none  -BS       Row Name 09/12/23 1510          Cognition    Orientation  Status (Cognition) oriented x 4  -BS       Row Name 09/12/23 1510          Safety Issues, Functional Mobility    Impairments Affecting Function (Mobility) balance  -BS               User Key  (r) = Recorded By, (t) = Taken By, (c) = Cosigned By      Initials Name Provider Type    Julio Buck, PT Physical Therapist                   Mobility       Row Name 09/12/23 1510          Bed Mobility    Bed Mobility supine-sit;scooting/bridging;sit-supine  -BS     Scooting/Bridging [place] (Bed Mobility) independent  -BS     Supine-Sit [place] (Bed Mobility) independent  -BS     Sit-Supine [place] (Bed Mobility) independent  -BS       Row Name 09/12/23 1510          Bed-Chair Transfer    Bed-Chair [place] (Transfers) not tested  -BS       Row Name 09/12/23 1510          Sit-Stand Transfer    Sit-Stand [place] (Transfers) independent  -BS     Assistive Device (Sit-Stand Transfers) other (see comments)  -BS     Comment, (Sit-Stand Transfer) no AD  -BS       Row Name 09/12/23 1510          Gait/Stairs (Locomotion)    [place] Level (Gait) independent  -BS     Assistive Device (Gait) other (see comments)  -BS     Distance in Feet (Gait) 400' with no AD, L shoe donned with R non-skid sock only  -BS     [place] Level (Stairs) modified independence  -BS     Handrail Location (Stairs) both sides  -BS     Number of Steps (Stairs) 10  -BS     Ascending Technique (Stairs) step-to-step  -BS     Descending Technique (Stairs) step-to-step  -BS     Comment, (Gait/Stairs) one LOB episode ascending steps, L knee buckled and pt able to self correct independently  -BS               User Key  (r) = Recorded By, (t) = Taken By, (c) = Cosigned By      Initials Name Provider Type    Julio Buck PT Physical Therapist                   Obj/Interventions       Row Name 09/12/23 1310          Range of Motion Comprehensive    General Range of Motion bilateral lower extremity ROM WNL  -BS     Comment,  General Range of Motion B LE AROM grossly WNL  -BS       Row Name 09/12/23 1310          Strength Comprehensive (MMT)    General Manual Muscle Testing (MMT) Assessment no strength deficits identified  -BS     Comment, General Manual Muscle Testing (MMT) Assessment B LE grossly 5/5  -BS       Row Name 09/12/23 1310          Balance    Balance Assessment sitting static balance;sitting dynamic balance  -BS     Static Sitting Balance independent  -BS     Dynamic Sitting Balance independent  -BS       Row Name 09/12/23 1310          Sensory Assessment (Somatosensory)    Sensory Assessment (Somatosensory) bilateral LE  -BS     Bilateral LE Sensory Assessment light touch awareness;impaired;other (see comments)  B lower legs/B feet numb (h/o neuropathy)  -BS               User Key  (r) = Recorded By, (t) = Taken By, (c) = Cosigned By      Initials Name Provider Type    BS Julio Farley, PT Physical Therapist                   Goals/Plan    No documentation.                  Clinical Impression       Row Name 09/12/23 1510          Pain    Pretreatment Pain Rating 7/10  -BS     Posttreatment Pain Rating 7/10  -BS     Pain Location - Side/Orientation Right  -BS     Pain Location - foot  -BS     Pain Intervention(s) Medication (See MAR)  -       Row Name 09/12/23 1510          Plan of Care Review    Plan of Care Reviewed With patient  -BS     Outcome Evaluation PT evaluation completed. Patient presented independent with all functional tasks, including independent with ambulation x 400' with no AD, modified indep up/down10 stairs with 2 handrails in stairwell. MMT 5/5 B LE's. Limited by R ankle/foot pain. No skilled PT required at this time. Tentative d/c set (per nursing report) for later this afternoon. No skilled PT need at this time. D/c'd from skilled PT at this time. Recommend d/c home alone with DME order of a straight cane to be used for home use.  -       Row Name 09/12/23 1510          Therapy Assessment/Plan  (PT)    Criteria for Skilled Interventions Met (PT) no;no problems identified which require skilled intervention  -BS     Therapy Frequency (PT) evaluation only  -BS       Row Name 09/12/23 1510          Vital Signs    Pre Systolic BP Rehab 200  188/84 manually  -BS     Pre Treatment Diastolic BP 98  -BS     Pretreatment Heart Rate (beats/min) 85  -BS     Pre SpO2 (%) 97  -BS     O2 Delivery Pre Treatment room air  -BS     Pre Patient Position Supine  -BS     Intra Patient Position Standing  -BS     Post Patient Position Supine  -BS       Row Name 09/12/23 1510          Positioning and Restraints    Pre-Treatment Position in bed  -BS     Post Treatment Position bed  -BS     In Bed supine;notified nsg  -BS               User Key  (r) = Recorded By, (t) = Taken By, (c) = Cosigned By      Initials Name Provider Type    Julio Buck, PT Physical Therapist                   Outcome Measures       Row Name 09/12/23 1510 09/12/23 0800       How much help from another person do you currently need...    Turning from your back to your side while in flat bed without using bedrails? 4  -BS 4  -RE    Moving from lying on back to sitting on the side of a flat bed without bedrails? 4  -BS 4  -RE    Moving to and from a bed to a chair (including a wheelchair)? 4  -BS 4  -RE    Standing up from a chair using your arms (e.g., wheelchair, bedside chair)? 4  -BS 4  -RE    Climbing 3-5 steps with a railing? 4  -BS 3  -RE    To walk in hospital room? 4  -BS 4  -RE    AM-PAC 6 Clicks Score (PT) 24  -BS 23  -RE    Highest level of mobility 8 --> Walked 250 feet or more  -BS 7 --> Walked 25 feet or more  -RE      Row Name 09/12/23 1510          Functional Assessment    Outcome Measure Options AM-PAC 6 Clicks Basic Mobility (PT)  -BS               User Key  (r) = Recorded By, (t) = Taken By, (c) = Cosigned By      Initials Name Provider Type    Archana Patel RN Registered Nurse    Julio Buck, PT Physical Therapist                     PT Recommendation and Plan     Plan of Care Reviewed With: patient  Outcome Evaluation: PT evaluation completed. Patient presented independent with all functional tasks, including independent with ambulation x 400' with no AD, modified indep up/down10 stairs with 2 handrails in stairwell. MMT 5/5 B LE's. Limited by R ankle/foot pain. No skilled PT required at this time. Tentative d/c set (per nursing report) for later this afternoon. No skilled PT need at this time. D/c'd from skilled PT at this time. Recommend d/c home alone with DME order of a straight cane to be used for home use.     Time Calculation:   PT Evaluation Complexity  History, PT Evaluation Complexity: 3 or more personal factors and/or comorbidities  Examination of Body Systems (PT Eval Complexity): total of 3 or more elements  Clinical Presentation (PT Evaluation Complexity): stable  Clinical Decision Making (PT Evaluation Complexity): low complexity  Overall Complexity (PT Evaluation Complexity): low complexity     PT Charges       Row Name 09/12/23 1553             Time Calculation    Start Time 1510  -BS      Stop Time 1544  -BS      Time Calculation (min) 34 min  -BS      PT Received On 09/12/23  -BS      PT Goal Re-Cert Due Date 09/12/23  -BS         Time Calculation- PT    Total Timed Code Minutes- PT 34 minute(s)  -BS                User Key  (r) = Recorded By, (t) = Taken By, (c) = Cosigned By      Initials Name Provider Type    Julio Buck, PT Physical Therapist                  Therapy Charges for Today       Code Description Service Date Service Provider Modifiers Qty    70399703996 HC PT EVAL MOD COMPLEXITY 2 9/12/2023 Julio Farley, PT GP 1            PT G-Codes  Outcome Measure Options: AM-PAC 6 Clicks Basic Mobility (PT)  AM-PAC 6 Clicks Score (PT): 24    PT Discharge Summary  Anticipated Discharge Disposition (PT): home    Julio Farley PT  9/12/2023

## 2023-09-12 NOTE — PLAN OF CARE
Goal Outcome Evaluation:  Plan of Care Reviewed With: patient           Outcome Evaluation: PT evaluation completed. Patient presented independent with all functional tasks, including independent with ambulation x 400' with no AD, modified indep up/down10 stairs with 2 handrails in stairwell. MMT 5/5 B LE's. Limited by R ankle/foot pain. No skilled PT required at this time. Tentative d/c set (per nursing report) for later this afternoon. No skilled PT need at this time. D/c'd from skilled PT at this time. Recommend d/c home alone with DME order of a straight cane to be used for home use.      Anticipated Discharge Disposition (PT): home

## 2023-09-12 NOTE — DISCHARGE SUMMARY
[facility] Medicine Services  DISCHARGE SUMMARY       Date of Admission: 9/9/2023  Date of Discharge:  9/12/2023  Primary Care Physician: Talisha Francois APRN    Presenting Problem/History of Present Illness:  Septic joint [M00.9]  Septic arthritis of right ankle, due to unspecified organism [M00.9]  Septic arthritis [M00.9]     Final Discharge Diagnoses:  Active Hospital Problems    Diagnosis     **Septic joint     Septic arthritis        Consults:   Consults       Date and Time Order Name Status Description    9/9/2023  4:13 AM Inpatient Podiatry Consult Completed             Procedures Performed: Procedure(s):  INCISION AND DRAINAGE LOWER EXTREMITY                Pertinent Test Results:   Lab Results (last 24 hours)       Procedure Component Value Units Date/Time    POC Glucose Once [[id number]]  (Abnormal) Collected: 09/12/23 1622    Specimen: Blood Updated: 09/12/23 1638     Glucose 166 mg/dL      Comment: RN NotifiedOperator: [id number] JumpSeat RHAYONAMeter ID: [id number]       POC Glucose Once [[id number]]  (Abnormal) Collected: 09/12/23 1057    Specimen: Blood Updated: 09/12/23 1112     Glucose 344 mg/dL      Comment: RN NotifiedOperator: [id number] JumpSeat RHAYONAMeter ID: [id number]       Body Fluid Culture - Body Fluid, Ankle, Right [[id number]] Collected: 09/09/23 0720    Specimen: Body Fluid from Ankle, Right Updated: 09/12/23 0904     Body Fluid Culture No growth at 3 days     Gram Stain Many (4+) WBCs seen      No organisms seen    Blood Culture - Blood, Arm, Left [[id number]]  (Normal) Collected: 09/09/23 0839    Specimen: Blood from Arm, Left Updated: 09/12/23 0900     Blood Culture No growth at 3 days    Blood Culture - Blood, Arm, Left [[id number]]  (Normal) Collected: 09/09/23 0653    Specimen: Blood from Arm, Left Updated: 09/12/23 0815     Blood Culture No growth at 3 days    Comprehensive Metabolic Panel [[id number]]  (Abnormal) Collected: 09/12/23 0605    Specimen: Blood Updated:  09/12/23 0649     Glucose 204 mg/dL      BUN 22 mg/dL      Creatinine 1.11 mg/dL      Sodium 131 mmol/L      Potassium 4.6 mmol/L      Chloride 97 mmol/L      CO2 23.0 mmol/L      Calcium 9.5 mg/dL      Total Protein 7.5 g/dL      Albumin 3.4 g/dL      ALT (SGPT) 12 U/L      AST (SGOT) 9 U/L      Alkaline Phosphatase 62 U/L      Total Bilirubin 0.3 mg/dL      Globulin 4.1 gm/dL      A/G Ratio 0.8 g/dL      BUN/Creatinine Ratio 19.8     Anion Gap 11.0 mmol/L      eGFR 72.8 mL/min/1.73     Narrative:      GFR Normal >60  Chronic Kidney Disease <60  Kidney Failure <15      POC Glucose Once [[id number]]  (Abnormal) Collected: 09/12/23 0621    Specimen: Blood Updated: 09/12/23 0638     Glucose 195 mg/dL      Comment: RN NotifiedOperator: [id number] FANTA CHAUDHRYMeter ID: [id number]       CBC (No Diff) [[id number]]  (Abnormal) Collected: 09/12/23 0605    Specimen: Blood Updated: 09/12/23 0629     WBC 8.44 10*3/mm3      RBC 3.62 10*6/mm3      Hemoglobin 9.7 g/dL      Hematocrit 29.1 %      MCV 80.4 fL      MCH 26.8 pg      MCHC 33.3 g/dL      RDW 16.3 %      RDW-SD 47.8 fl      MPV 9.3 fL      Platelets 379 10*3/mm3     Anaerobic Culture - Aspirate, Ankle, Right [[id number]]  (Normal) Collected: 09/09/23 0720    Specimen: Aspirate from Ankle, Right Updated: 09/12/23 0612     Anaerobic Culture No anaerobes isolated at 3 days    POC Glucose Once [[id number]]  (Abnormal) Collected: 09/11/23 1935    Specimen: Blood Updated: 09/12/23 0100     Glucose 202 mg/dL      Comment: RN NotifiedOperator: [id number] SONY Majano ID: [id number]       POC Glucose Once [[id number]]  (Abnormal) Collected: 09/11/23 1745    Specimen: Blood Updated: 09/11/23 1804     Glucose 275 mg/dL      Comment: RN NotifiedOperator: [id number] JOSSE Lopez ID: [id number]             Imaging Results (Last 24 Hours)       Procedure Component Value Units Date/Time    US Guided Vascular Access [[id number]] Collected: 09/11/23 1815     Updated: 09/11/23  "1933    Narrative:      Ultrasound guidance vascular    FINDINGS:  Real-time ultrasound imaging was provided for vascular access.  Please refer to  procedure note for real-time exam findings.  The left basilic vein was found to  be patent and compressible.  Access under direct sonographic visualization.  Permanent saved images sent to the PACS for documentation.      IR Insert Midline Without Port Pump 5 Plus [[id number]] Resulted: 09/11/23 1714     Updated: 09/11/23 1714    Narrative:      This procedure was auto-finalized with no dictation required.          Chief Complaint on Day of Discharge: No complaints    Hospital Course:   This is a 68-year-old male with past medical history of rheumatoid arthritis (on prednisone 20 mg a day for a long time), diabetes mellitus type 2, hypertension, hypothyroidism, peripheral neuropathy, posttraumatic stress disorder, coronary artery disease and sleep apnea that presented to [facility] on 9/9/2023 with complaints of right ankle pain.     The patient was admitted for possible septic arthritis of the right ankle.  He was evaluated by Dr. Baker with podiatry and the right ankle was tapped with 3 cc of fluid aspirated and sent for evaluation. Cultures were negative. MRI did not strongly suggest septic arthritis.  The patient was treated for cellulitis with IV antibiotics and discharged with oral.  Patient refused home health.  Follow with PCP and podiatry in one week. 3 days of pain medication prescribed.      Condition on Discharge:  Stable    Physical Exam on Discharge:  /78   Pulse 72   Temp 97.9 °F (36.6 °C) (Temporal)   Resp 18   Ht 182.9 cm (72\")   Wt 88 kg (194 lb)   SpO2 98%   BMI 26.31 kg/m²   Physical Exam  Constitutional:       Appearance: He is well-developed.   HENT:      Head: Normocephalic and atraumatic.   Eyes:      Pupils: Pupils are equal, round, and reactive to light.   Cardiovascular:      Rate and Rhythm: Normal rate and regular " rhythm.   Pulmonary:      Effort: Pulmonary effort is normal.      Breath sounds: Normal breath sounds.   Abdominal:      General: Bowel sounds are normal.      Palpations: Abdomen is soft.   Musculoskeletal:         General: Normal range of motion.      Cervical back: Normal range of motion and neck supple.   Skin:     General: Skin is warm and dry.   Neurological:      Mental Status: He is alert and oriented to person, place, and time.   Psychiatric:         Behavior: Behavior normal.     [place] [facility] [place] Medications:     Discharge Medications        New Medications        Instructions Start Date   cephalexin 500 MG capsule  Commonly known as: Keflex  Notes to patient: 09/12/2023   500 mg, Oral, 4 Times Daily      linezolid 600 MG tablet  Commonly known as: Zyvox  Notes to patient: 09/12/2023   600 mg, Oral, 2 Times Daily      oxyCODONE-acetaminophen 7.5-325 MG per tablet  Commonly known as: PERCOCET  Notes to patient: As needed   1 tablet, Oral, Every 6 Hours PRN      sodium chloride 1 g tablet  Notes to patient: 09/12/2023   1 g, Oral, 2 Times Daily             Continue These Medications        Instructions Start Date   cholecalciferol 25 MCG (1000 UT) tablet  Commonly known as: VITAMIN D3  Notes to patient: 09/13/2023   2,000 Units, Oral, Daily      cloNIDine 0.1 MG tablet  Commonly known as: CATAPRES  Notes to patient: As needed   0.1 mg, Oral, As Needed      COOL BLOOD GLUCOSE TEST STRIPS VI   Every 8 Hours Scheduled      Dulaglutide 0.75 MG/0.5ML solution pen-injector  Notes to patient: As directed    Trulicity 0.75 mg/0.5 mL subcutaneous pen injector   INJECT CONENTS OF 1 PEN ONCE WEEKLY      famotidine 40 MG tablet  Commonly known as: PEPCID  Notes to patient: 09/13/2023   40 mg, Oral, Daily      levothyroxine 200 MCG tablet  Commonly known as: SYNTHROID, LEVOTHROID  Notes to patient: 09/13/2023   200 mcg, Oral, Daily      losartan 100 MG tablet  Commonly known as:  COZAAR  Notes to patient: 09/13/2023   100 mg, Oral, Daily      metFORMIN 1000 MG tablet  Commonly known as: GLUCOPHAGE  Notes to patient: 09/13/2023   1,000 mg      pravastatin 40 MG tablet  Commonly known as: PRAVACHOL  Notes to patient: 09/12/2023       predniSONE 10 MG tablet  Commonly known as: DELTASONE  Notes to patient: 09/12/2023   10 mg, Oral, 2 Times Daily      pregabalin 75 MG capsule  Commonly known as: LYRICA  Notes to patient: 09/12/2023   150 mg, Oral, 2 Times Daily      valACYclovir 500 MG tablet  Commonly known as: VALTREX  Notes to patient: 09/13/2023   500 mg, Oral, Daily               Discharge Diet:   Diet Instructions       Diet: Cardiac Diets, Diabetic Diets; Healthy Heart (2-3 Na+); Thin (IDDSI 0); Consistent Carbohydrate      Discharge Diet:  Cardiac Diets  Diabetic Diets       Cardiac Diet: Healthy Heart (2-3 Na+)    Fluid Consistency: Thin (IDDSI 0)    Diabetic Diet: Consistent Carbohydrate            Activity at Discharge:   Activity Instructions       Activity as Tolerated              Discharge Care Plan/Instructions: As above.     Follow-up Appointment:  Additional Instructions for the Follow-ups that You Need to Schedule       Ambulatory Referral to Home Health (Hospital)   As directed      Face to Face Visit Date: 9/12/2023   Follow-up provider for Plan of Care?: I treated the patient in an acute care facility and will not continue treatment after discharge.   Follow-up provider: OMA ZUNIGA [[id number]]   Reason/Clinical Findings: right ankle cellulitis/ mobility issues   Describe mobility limitations that make leaving home difficult: right ankle cellulitis/ mobility issues   Nursing/Therapeutic Services Requested: Skilled Nursing Physical Therapy Occupational Therapy   Skilled nursing orders: Cardiopulmonary assessments   PT orders: Therapeutic exercise Gait Training Transfer training Home safety assessment   Weight Bearing Status: As Tolerated   Occupational orders: Activities  of daily living Strengthening Home safety assessment   Frequency: 1 Week 1               Follow-up Information       Talisha Francois APRN Follow up on 9/27/2023.    Specialty: Nurse Practitioner  Why: Wednesday at 2:00 pm  Contact information:  [address] [facility] [address] [phone]               Barrington Baker DPM Follow up in 1 week(s).    Specialty: Podiatry  Contact information:  [address] [facility] [address] [phone]                             Test Results Pending at Discharge:   Pending Labs       Order Current Status    Fungus Culture - Body Fluid, Ankle, Right In process    Anaerobic Culture - Aspirate, Ankle, Right Preliminary result    Blood Culture - Blood, Arm, Left Preliminary result    Blood Culture - Blood, Arm, Left Preliminary result    Body Fluid Culture - Body Fluid, Ankle, Right Preliminary result              This document has been electronically signed by VY Diego on September 12, 2023 16:46 CDT        Time: Greater than 30 minutes.

## 2023-09-12 NOTE — PLAN OF CARE
Goal Outcome Evaluation:           Progress: improving  Outcome Evaluation: Pain is under control. Continues to ask for morphine iv and percocet for right foot/ankle pain with relief. Swelling to right foot and right ankle has improved. SBA to toilet and steady. Blood pressure and blood sugar monitored. Prn hydralazine given. Urine output adequate. IV antibiotic given. VSS

## 2023-09-12 NOTE — PROGRESS NOTES
Podiatric Surgery Progress Note    Subjective     Patient resting comfortably.  No acute distress.    Objective     Vital signs in last 24 hours:  Temp:  [97.3 °F (36.3 °C)-98 °F (36.7 °C)] 97.7 °F (36.5 °C)  Heart Rate:  [74-87] 87  Resp:  [16-20] 16  BP: (155-189)/(72-90) 169/78    General: alert, appears stated age, and cooperative   Neurovascular: SILT  Capillary refill: Normal   Wound: No open wound.  Improving erythema and edema right lower extremity.   Range of Motion: Improving with minimal discomfort   DVT Exam: No evidence of DVT seen on physical exam.     Data Review  CBC:  Results from last 7 days   Lab Units 09/12/23  0605   WBC 10*3/mm3 8.44   RBC 10*6/mm3 3.62*   HEMOGLOBIN g/dL 9.7*   HEMATOCRIT % 29.1*   PLATELETS 10*3/mm3 379       Assessment & Plan     Cellulitis right lower extremity    Patient has improved significantly since Sunday. No surgery warranted at this time. Podiatry will sign off. Call with questions.      LOS: 3 days     Barrington Baker DPM    Date: 9/12/2023  Time: 12:30 CDT

## 2023-09-13 NOTE — PAYOR COMM NOTE
"Marianne Meyers  [facility]  Case Management Extender  [phone] phone  [phone] fax      Auth# [id number]     Andreas Billy (68 y.o. Male)       Date of Birth   1955    Social Security Number       Address   [address] [place] [id number]    Home Phone   [phone]    MRN   [id number]       Sabianism   Sikh    Marital Status   Single                            Admission Date   9/9/23    Admission Type   Emergency    Admitting Provider   Behroozi, Saeid, MD    Attending Provider       Department, Room/Bed   [facility] 3 EAST, 374/1       Discharge Date   9/12/2023    Discharge Disposition   Home-Health Care [facility]    Discharge Destination                                 Attending Provider: (none)   Allergies: Vancomycin    Isolation: None   Infection: None   Code Status: Prior    Ht: 182.9 cm (72\")   Wt: 88 kg (194 lb)    Admission Cmt: None   Principal Problem: Septic joint [M00.9]                   Active Insurance as of 9/9/2023       Primary Coverage       Payor Plan Insurance Group Employer/Plan Group    HUMANA MEDICARE REPLACEMENT HUMANA MEDICARE REPLACEMENT [id number]       Payor Plan Address Payor Plan Phone Number Payor Plan Fax Number Effective Dates    [address] [address] [phone]  1/1/2022 - None Entered    [facility] [address]         Subscriber Name Subscriber Birth Date Member ID       ANDREAS BILLY 1955 [id number]               Secondary Coverage       Payor Plan Insurance Group Employer/Plan Group    [place]        Payor Plan Address Payor Plan Phone Number Payor Plan Fax Number Effective Dates    [address] [phone]  6/24/2020 - None Entered    [facility] [id number]         Subscriber Name Subscriber Birth Date Member ID       ANDREAS BILLY 1955 [id number]                     Emergency Contacts        (Rel.) Home Phone Work Phone Mobile Phone "    Charline Duran (Daughter) [phone] -- [phone]    kimberly rodrigez (Daughter) [phone] -- [phone]                 Discharge Summary        Cheryl Mixon APRN at 09/12/23 1645              [facility] Medicine Services  DISCHARGE SUMMARY       Date of Admission: 9/9/2023  Date of Discharge:  9/12/2023  Primary Care Physician: Talisha Francois APRN    Presenting Problem/History of Present Illness:  Septic joint [M00.9]  Septic arthritis of right ankle, due to unspecified organism [M00.9]  Septic arthritis [M00.9]     Final Discharge Diagnoses:  Active Hospital Problems    Diagnosis     **Septic joint     Septic arthritis        Consults:   Consults       Date and Time Order Name Status Description    9/9/2023  4:13 AM Inpatient Podiatry Consult Completed             Procedures Performed: Procedure(s):  INCISION AND DRAINAGE LOWER EXTREMITY                Pertinent Test Results:   Lab Results (last 24 hours)       Procedure Component Value Units Date/Time    POC Glucose Once [[id number]]  (Abnormal) Collected: 09/12/23 1622    Specimen: Blood Updated: 09/12/23 1638     Glucose 166 mg/dL      Comment: RN NotifiedOperator: [id number] [employer]! RHAYONAMeter ID: [id number]       POC Glucose Once [[id number]]  (Abnormal) Collected: 09/12/23 1057    Specimen: Blood Updated: 09/12/23 1112     Glucose 344 mg/dL      Comment: RN NotifiedOperator: [id number] [employer]! RHAYONAMeter ID: [id number]       Body Fluid Culture - Body Fluid, Ankle, Right [[id number]] Collected: 09/09/23 0720    Specimen: Body Fluid from Ankle, Right Updated: 09/12/23 0904     Body Fluid Culture No growth at 3 days     Gram Stain Many (4+) WBCs seen      No organisms seen    Blood Culture - Blood, Arm, Left [[id number]]  (Normal) Collected: 09/09/23 0839    Specimen: Blood from Arm, Left Updated: 09/12/23 0900     Blood Culture No growth at 3 days    Blood Culture - Blood, Arm, Left [[id number]]  (Normal) Collected: 09/09/23 0623     Specimen: Blood from Arm, Left Updated: 09/12/23 0815     Blood Culture No growth at 3 days    Comprehensive Metabolic Panel [[id number]]  (Abnormal) Collected: 09/12/23 0605    Specimen: Blood Updated: 09/12/23 0649     Glucose 204 mg/dL      BUN 22 mg/dL      Creatinine 1.11 mg/dL      Sodium 131 mmol/L      Potassium 4.6 mmol/L      Chloride 97 mmol/L      CO2 23.0 mmol/L      Calcium 9.5 mg/dL      Total Protein 7.5 g/dL      Albumin 3.4 g/dL      ALT (SGPT) 12 U/L      AST (SGOT) 9 U/L      Alkaline Phosphatase 62 U/L      Total Bilirubin 0.3 mg/dL      Globulin 4.1 gm/dL      A/G Ratio 0.8 g/dL      BUN/Creatinine Ratio 19.8     Anion Gap 11.0 mmol/L      eGFR 72.8 mL/min/1.73     Narrative:      GFR Normal >60  Chronic Kidney Disease <60  Kidney Failure <15      POC Glucose Once [[id number]]  (Abnormal) Collected: 09/12/23 0621    Specimen: Blood Updated: 09/12/23 0638     Glucose 195 mg/dL      Comment: RN NotifiedOperator: [id number] FANTA Fitzgerald ID: [id number]       CBC (No Diff) [[id number]]  (Abnormal) Collected: 09/12/23 0605    Specimen: Blood Updated: 09/12/23 0629     WBC 8.44 10*3/mm3      RBC 3.62 10*6/mm3      Hemoglobin 9.7 g/dL      Hematocrit 29.1 %      MCV 80.4 fL      MCH 26.8 pg      MCHC 33.3 g/dL      RDW 16.3 %      RDW-SD 47.8 fl      MPV 9.3 fL      Platelets 379 10*3/mm3     Anaerobic Culture - Aspirate, Ankle, Right [[id number]]  (Normal) Collected: 09/09/23 0720    Specimen: Aspirate from Ankle, Right Updated: 09/12/23 0612     Anaerobic Culture No anaerobes isolated at 3 days    POC Glucose Once [[id number]]  (Abnormal) Collected: 09/11/23 1935    Specimen: Blood Updated: 09/12/23 0100     Glucose 202 mg/dL      Comment: RN NotifiedOperator: [id number] SONY Majano ID: [id number]       POC Glucose Once [[id number]]  (Abnormal) Collected: 09/11/23 1745    Specimen: Blood Updated: 09/11/23 1804     Glucose 275 mg/dL      Comment: RN NotifiedOperator: [id number] JOSSE  "John ID: [id number]             Imaging Results (Last 24 Hours)       Procedure Component Value Units Date/Time    US Guided Vascular Access [[id number]] Collected: 09/11/23 1815     Updated: 09/11/23 1933    Narrative:      Ultrasound guidance vascular    FINDINGS:  Real-time ultrasound imaging was provided for vascular access.  Please refer to  procedure note for real-time exam findings.  The left basilic vein was found to  be patent and compressible.  Access under direct sonographic visualization.  Permanent saved images sent to the PACS for documentation.      IR Insert Midline Without Port Pump 5 Plus [[id number]] Resulted: 09/11/23 1714     Updated: 09/11/23 1714    Narrative:      This procedure was auto-finalized with no dictation required.          Chief Complaint on Day of Discharge: No complaints    Hospital Course:   This is a 68-year-old male with past medical history of rheumatoid arthritis (on prednisone 20 mg a day for a long time), diabetes mellitus type 2, hypertension, hypothyroidism, peripheral neuropathy, posttraumatic stress disorder, coronary artery disease and sleep apnea that presented to [facility] on 9/9/2023 with complaints of right ankle pain.     The patient was admitted for possible septic arthritis of the right ankle.  He was evaluated by Dr. Baker with podiatry and the right ankle was tapped with 3 cc of fluid aspirated and sent for evaluation. Cultures were negative. MRI did not strongly suggest septic arthritis.  The patient was treated for cellulitis with IV antibiotics and discharged with oral.  Patient refused home health.  Follow with PCP and podiatry in one week. 3 days of pain medication prescribed.      Condition on Discharge:  Stable    Physical Exam on Discharge:  /78   Pulse 72   Temp 97.9 °F (36.6 °C) (Temporal)   Resp 18   Ht 182.9 cm (72\")   Wt 88 kg (194 lb)   SpO2 98%   BMI 26.31 kg/m²   Physical Exam  Constitutional:       Appearance: " He is well-developed.   HENT:      Head: Normocephalic and atraumatic.   Eyes:      Pupils: Pupils are equal, round, and reactive to light.   Cardiovascular:      Rate and Rhythm: Normal rate and regular rhythm.   Pulmonary:      Effort: Pulmonary effort is normal.      Breath sounds: Normal breath sounds.   Abdominal:      General: Bowel sounds are normal.      Palpations: Abdomen is soft.   Musculoskeletal:         General: Normal range of motion.      Cervical back: Normal range of motion and neck supple.   Skin:     General: Skin is warm and dry.   Neurological:      Mental Status: He is alert and oriented to person, place, and time.   Psychiatric:         Behavior: Behavior normal.     Discharge Disposition:  Home-Health Care [facility]    Discharge Medications:     Discharge Medications        New Medications        Instructions Start Date   cephalexin 500 MG capsule  Commonly known as: Keflex  Notes to patient: 09/12/2023   500 mg, Oral, 4 Times Daily      linezolid 600 MG tablet  Commonly known as: Zyvox  Notes to patient: 09/12/2023   600 mg, Oral, 2 Times Daily      oxyCODONE-acetaminophen 7.5-325 MG per tablet  Commonly known as: PERCOCET  Notes to patient: As needed   1 tablet, Oral, Every 6 Hours PRN      sodium chloride 1 g tablet  Notes to patient: 09/12/2023   1 g, Oral, 2 Times Daily             Continue These Medications        Instructions Start Date   cholecalciferol 25 MCG (1000 UT) tablet  Commonly known as: VITAMIN D3  Notes to patient: 09/13/2023   2,000 Units, Oral, Daily      cloNIDine 0.1 MG tablet  Commonly known as: CATAPRES  Notes to patient: As needed   0.1 mg, Oral, As Needed      COOL BLOOD GLUCOSE TEST STRIPS VI   Every 8 Hours Scheduled      Dulaglutide 0.75 MG/0.5ML solution pen-injector  Notes to patient: As directed    Trulicity 0.75 mg/0.5 mL subcutaneous pen injector   INJECT CONENTS OF 1 PEN ONCE WEEKLY      famotidine 40 MG tablet  Commonly known as: PEPCID  Notes to patient:  09/13/2023   40 mg, Oral, Daily      levothyroxine 200 MCG tablet  Commonly known as: SYNTHROID, LEVOTHROID  Notes to patient: 09/13/2023   200 mcg, Oral, Daily      losartan 100 MG tablet  Commonly known as: COZAAR  Notes to patient: 09/13/2023   100 mg, Oral, Daily      metFORMIN 1000 MG tablet  Commonly known as: GLUCOPHAGE  Notes to patient: 09/13/2023   1,000 mg      pravastatin 40 MG tablet  Commonly known as: PRAVACHOL  Notes to patient: 09/12/2023       predniSONE 10 MG tablet  Commonly known as: DELTASONE  Notes to patient: 09/12/2023   10 mg, Oral, 2 Times Daily      pregabalin 75 MG capsule  Commonly known as: LYRICA  Notes to patient: 09/12/2023   150 mg, Oral, 2 Times Daily      valACYclovir 500 MG tablet  Commonly known as: VALTREX  Notes to patient: 09/13/2023   500 mg, Oral, Daily               Discharge Diet:   Diet Instructions       Diet: Cardiac Diets, Diabetic Diets; Healthy Heart (2-3 Na+); Thin (IDDSI 0); Consistent Carbohydrate      Discharge Diet:  Cardiac Diets  Diabetic Diets       Cardiac Diet: Healthy Heart (2-3 Na+)    Fluid Consistency: Thin (IDDSI 0)    Diabetic Diet: Consistent Carbohydrate            Activity at Discharge:   Activity Instructions       Activity as Tolerated              Discharge Care Plan/Instructions: As above.     Follow-up Appointment:  Additional Instructions for the Follow-ups that You Need to Schedule       Ambulatory Referral to Home Health (Hospital)   As directed      Face to Face Visit Date: 9/12/2023   Follow-up provider for Plan of Care?: I treated the patient in an acute care facility and will not continue treatment after discharge.   Follow-up provider: OMA ZUNIGA [[id number]]   Reason/Clinical Findings: right ankle cellulitis/ mobility issues   Describe mobility limitations that make leaving home difficult: right ankle cellulitis/ mobility issues   Nursing/Therapeutic Services Requested: Skilled Nursing Physical Therapy Occupational Therapy    Skilled nursing orders: Cardiopulmonary assessments   PT orders: Therapeutic exercise Gait Training Transfer training Home safety assessment   Weight Bearing Status: As Tolerated   Occupational orders: Activities of daily living Strengthening Home safety assessment   Frequency: 1 Week 1               Follow-up Information       Talisha Francois APRN Follow up on 9/27/2023.    Specialty: Nurse Practitioner  Why: Wednesday at 2:00 pm  Contact information:  [address] [facility] [id number] [phone]               Barrington Baker, DPRASTA Follow up in 1 week(s).    Specialty: Podiatry  Contact information:  [address] [facility] [address] [phone]                             Test Results Pending at Discharge:   Pending Labs       Order Current Status    Fungus Culture - Body Fluid, Ankle, Right In process    Anaerobic Culture - Aspirate, Ankle, Right Preliminary result    Blood Culture - Blood, Arm, Left Preliminary result    Blood Culture - Blood, Arm, Left Preliminary result    Body Fluid Culture - Body Fluid, Ankle, Right Preliminary result              This document has been electronically signed by VY Diego on September 12, 2023 16:46 CDT        Time: Greater than 30 minutes.                  Electronically signed by Cheryl Mixon APRN at 09/12/23 1649       Discharge Order (From admission, onward)       Start     Ordered    09/12/23 1451  Discharge patient  Once        Expected Discharge Date: 09/12/23   Discharge Disposition: Home-Health Care [facility] [place]   Physician of Record for Attribution - Please select from Treatment Team: LILLIAM BOO [[id number]]   Review needed by CMO to determine Physician of Record: No      Question Answer Comment   Physician of Record for Attribution - Please select from Treatment Team LILLIAM BOO    Review needed by CMO to determine Physician of Record No        09/12/23 5804

## 2023-09-13 NOTE — OUTREACH NOTE
Prep Survey      Flowsheet Row Responses   Sabianism facility patient discharged from? [place]   Is LACE score < 7 ? No   Eligibility Readm Mgmt   Discharge diagnosis Septic Joint   Does the patient have one of the following disease processes/diagnoses(primary or secondary)? Sepsis   Does the patient have Home health ordered? No   Is there a DME ordered? Yes   What DME was ordered? [facility]   Prep survey completed? Yes            Talia ARRINGTON - Registered Nurse

## 2023-09-14 LAB
BACTERIA FLD CULT: NORMAL
BACTERIA SPEC AEROBE CULT: NORMAL
BACTERIA SPEC AEROBE CULT: NORMAL
BACTERIA SPEC ANAEROBE CULT: NORMAL
GRAM STN SPEC: NORMAL
GRAM STN SPEC: NORMAL

## 2023-09-15 ENCOUNTER — READMISSION MANAGEMENT (OUTPATIENT)
Dept: CALL CENTER | Facility: HOSPITAL | Age: 68
End: 2023-09-15
Payer: MEDICARE

## 2023-09-15 NOTE — OUTREACH NOTE
Sepsis Week 1 Survey      Flowsheet Row Responses   [facility] facility patient discharged from? [place]   Does the patient have one of the following disease processes/diagnoses(primary or secondary)? Sepsis   Week 1 attempt successful? No   Unsuccessful attempts Attempt 1            Rocio Perales Registered Nurse

## 2023-09-16 LAB — FUNGUS WND CULT: NORMAL

## 2023-09-18 ENCOUNTER — READMISSION MANAGEMENT (OUTPATIENT)
Dept: CALL CENTER | Facility: HOSPITAL | Age: 68
End: 2023-09-18
Payer: MEDICARE

## 2023-09-18 NOTE — OUTREACH NOTE
Sepsis Week 1 Survey      Flowsheet Row Responses   [facility] patient discharged from? [place]   Does the patient have one of the following disease processes/diagnoses(primary or secondary)? Sepsis   Week 1 attempt successful? Yes   Call start time 1529   Call end time 1532   Discharge diagnosis Septic Joint   Meds reviewed with patient/caregiver? Yes   Is the patient having any side effects they believe may be caused by any medication additions or changes? No   Does the patient have all medications related to this admission filled (includes all antibiotics, inhalers, nebulizers,steroids,etc.) Yes   Is the patient taking all medications as directed (includes completed medication regime)? Yes   Does the patient have a primary care provider?  Yes   Does the patient have an appointment with their PCP within 7 days of discharge? Yes   Has the patient kept scheduled appointments due by today? Yes   Psychosocial issues? No   Nursing interventions Reviewed instructions with patient   What is the patient's perception of their health status since discharge? Improving   Nursing interventions Nurse provided patient education   Is the patient/caregiver able to teach back TIME? T emperature - higher or lower than normal, M ental Decline - confused, sleepy, difficult to arouse, I nfection - may have signs and symptoms of an infection, E xtremely Ill - severe pain, discomfort, shortness of breath   Is patient/caregiver able to teach back steps to recovery at home? Set small, achievable goals for return to baseline health, Rest and regain strength   Is the patient/caregiver able to teach back signs and symptoms of worsening condition: Fever, Rapid heart rate (>90), Edema, Altered mental status(confusion/coma)   If the patient is a current smoker, are they able to teach back resources for cessation? [id number]   Is the patient/caregiver able to teach back the hierarchy of who to call/visit for symptoms/problems? PCP,  Specialist, Home health nurse, Urgent Care, ED, 911 Yes   Week 1 call completed? Yes   Is the patient interested in additional calls from an ambulatory ? No   Would this patient benefit from a Referral to Amb Social Work? No   Wrap up additional comments doing well, hasappts all set up.   Call end time 1532            ANGEL MCGRATH - Registered Nurse

## 2023-09-19 ENCOUNTER — OFFICE VISIT (OUTPATIENT)
Dept: PODIATRY | Facility: CLINIC | Age: 68
End: 2023-09-19
Payer: MEDICARE

## 2023-09-19 VITALS
BODY MASS INDEX: 26.28 KG/M2 | HEART RATE: 89 BPM | WEIGHT: 194 LBS | SYSTOLIC BLOOD PRESSURE: 180 MMHG | OXYGEN SATURATION: 92 % | DIASTOLIC BLOOD PRESSURE: 79 MMHG | HEIGHT: 72 IN

## 2023-09-19 DIAGNOSIS — L03.115 CELLULITIS OF RIGHT LOWER LEG: Primary | ICD-10-CM

## 2023-09-19 PROCEDURE — 3077F SYST BP >= 140 MM HG: CPT | Performed by: PODIATRIST

## 2023-09-19 PROCEDURE — 1159F MED LIST DOCD IN RCRD: CPT | Performed by: PODIATRIST

## 2023-09-19 PROCEDURE — 99212 OFFICE O/P EST SF 10 MIN: CPT | Performed by: PODIATRIST

## 2023-09-19 PROCEDURE — 3078F DIAST BP <80 MM HG: CPT | Performed by: PODIATRIST

## 2023-09-19 PROCEDURE — 1160F RVW MEDS BY RX/DR IN RCRD: CPT | Performed by: PODIATRIST

## 2023-09-19 NOTE — PROGRESS NOTES
Andreas Billy  1955  68 y.o. male      09/19/2023    Chief Complaint   Patient presents with    Left Ankle - Follow-up       History of Present Illness    Andreas Billy is a 68 y.o.male presents to clinic today for a hospital follow up on his right foot and ankle.       Past Medical History:   Diagnosis Date    Abdominal pain     Acquired hypothyroidism     Anemia     Anxiety and depression     Arthritis     Coronary artery disease     Diabetes mellitus     Full dentures     Hashimoto's thyroiditis     Head injury     Hypertension     Peripheral neuropathy     PTSD (post-traumatic stress disorder)     Simple goiter     Sleep apnea     Wears glasses          Past Surgical History:   Procedure Laterality Date    CAROTID ENDARTERECTOMY Right 5/23/2018    Procedure: RIGHT CAROTID ENDARTERECTOMY carotid cerebral arteriogram     (C-ARM#2);  Surgeon: Bayron Griffin MD;  Location: [facility] OR;  Service: Vascular    CAROTID ENDARTERECTOMY Left 6/27/2018    Procedure: CAROTID ENDARTERECTOMY                          (C-ARM);  Surgeon: Bayron Griffin MD;  Location: [facility] OR;  Service: Vascular    CARPAL TUNNEL RELEASE Bilateral     COLON RESECTION      COLONOSCOPY N/A 6/19/2023    Procedure: COLONOSCOPY;  Surgeon: Emery Estrada MD;  Location: [facility] ENDOSCOPY;  Service: Gastroenterology;  Laterality: N/A;    ENDOSCOPY N/A 6/19/2023    Procedure: ESOPHAGOGASTRODUODENOSCOPY;  Surgeon: Emery Estrada MD;  Location: [facility] ENDOSCOPY;  Service: Gastroenterology;  Laterality: N/A;    SHOULDER ARTHROSCOPY Left     VASECTOMY           Family History   Problem Relation Age of Onset    Hypertension Other     Thyroid disease Other        Allergies   Allergen Reactions    Vancomycin Rash     Allergy first known in 1980       Social History     Socioeconomic History    Marital status: Single   Tobacco Use    Smoking status: Every Day     Packs/day: 0.50     Years: 50.00     Pack years: 25.00     Types:  "Cigarettes     Passive exposure: Past    Smokeless tobacco: Never    Tobacco comments:     Passive his whole life   Vaping Use    Vaping Use: Never used   Substance and Sexual Activity    Alcohol use: Not Currently     Comment: none since 2005    Drug use: Yes     Types: Marijuana    Sexual activity: Defer         Current Outpatient Medications   Medication Sig Dispense Refill    cholecalciferol (VITAMIN D3) 1000 units tablet Take 2 tablets by mouth Daily.      CloNIDine (CATAPRES) 0.1 MG tablet Take 1 tablet by mouth As Needed (high BP).      Dulaglutide 0.75 MG/0.5ML solution pen-injector Trulicity 0.75 mg/0.5 mL subcutaneous pen injector   INJECT CONENTS OF 1 PEN ONCE WEEKLY      famotidine (PEPCID) 40 MG tablet Take 1 tablet by mouth Daily.      Glucose Blood (COOL BLOOD GLUCOSE TEST STRIPS VI) Every 8 (Eight) Hours.      levothyroxine (SYNTHROID, LEVOTHROID) 200 MCG tablet Take 1 tablet by mouth Daily.      losartan (COZAAR) 100 MG tablet Take 1 tablet by mouth Daily.      metFORMIN (GLUCOPHAGE) 1000 MG tablet Take 1 tablet by mouth. (Patient not taking: Reported on 8/17/2023)      pravastatin (PRAVACHOL) 40 MG tablet       predniSONE (DELTASONE) 10 MG tablet Take 1 tablet by mouth 2 (Two) Times a Day.      pregabalin (LYRICA) 75 MG capsule Take 2 capsules by mouth 2 (Two) Times a Day.      sodium chloride 1 g tablet Take 1 tablet by mouth 2 (Two) Times a Day. 60 tablet 0    valACYclovir (VALTREX) 500 MG tablet Take 1 tablet by mouth Daily.       No current facility-administered medications for this visit.       Review of Systems   Musculoskeletal:         Foot pain   All other systems reviewed and are negative.      OBJECTIVE    /79   Pulse 89   Ht 182.9 cm (72\")   Wt 88 kg (194 lb)   SpO2 92%   BMI 26.31 kg/m²     Physical Exam  HENT:      Head: Normocephalic.   Cardiovascular:      Pulses:           Dorsalis pedis pulses are 2+ on the right side.        Posterior tibial pulses are 2+ on the right " side.   Pulmonary:      Effort: Pulmonary effort is normal.   Musculoskeletal:      Right ankle: No ecchymosis or lacerations. Decreased range of motion. Anterior drawer test negative. Normal pulse.      Right Achilles Tendon: Normal.      Right foot: Bunion present.   Feet:      Right foot:      Skin integrity: No ulcer.   Neurological:      Mental Status: He is alert.   Psychiatric:         Mood and Affect: Mood normal.            Procedures        ASSESSMENT AND PLAN    Diagnoses and all orders for this visit:    1. Cellulitis of right lower leg (Primary)        - Patient examined and evaluated  - doing well after hospital stay  - All questions were answered   - RTC as needed            This document has been electronically signed by Barrington Baker DPM on September 19, 2023 15:25 CDT     9/19/2023  15:25 CDT

## 2023-09-23 LAB — FUNGUS WND CULT: NORMAL

## 2023-09-27 ENCOUNTER — READMISSION MANAGEMENT (OUTPATIENT)
Dept: CALL CENTER | Facility: HOSPITAL | Age: 68
End: 2023-09-27
Payer: MEDICARE

## 2023-09-27 NOTE — OUTREACH NOTE
Sepsis Week 2 Survey      Flowsheet Row Responses   [facility] patient discharged from? [place]   Does the patient have one of the following disease processes/diagnoses(primary or secondary)? Sepsis   Week 2 attempt successful? Yes   Call start time 1106   Call end time 1113   Discharge diagnosis Septic Joint   Meds reviewed with patient/caregiver? Yes   Is the patient having any side effects they believe may be caused by any medication additions or changes? No   Does the patient have all medications related to this admission filled (includes all antibiotics, inhalers, nebulizers,steroids,etc.) Yes   Is the patient taking all medications as directed (includes completed medication regime)? Yes   Does the patient have a primary care provider?  Yes   Has the patient kept scheduled appointments due by today? Yes   Has home health visited the patient within 72 hours of discharge? N/A   What DME was ordered? Cane   Has all DME been delivered? No   DME interventions Other   DME comments Patient states he did not get a cane. He requested the number for [facility] so he could call to check on this.   Psychosocial issues? No   Did the patient receive a copy of their discharge instructions? Yes   Nursing interventions Reviewed instructions with patient   What is the patient's perception of their health status since discharge? Improving   Nursing interventions Nurse provided patient education   Is the patient/caregiver able to teach back TIME? T emperature - higher or lower than normal, I nfection - may have signs and symptoms of an infection, M ental Decline - confused, sleepy, difficult to arouse, E xtremely Ill - severe pain, discomfort, shortness of breath   Is patient/caregiver able to teach back steps to recovery at home? Set small, achievable goals for return to baseline health, Rest and regain strength   Is the patient/caregiver able to teach back signs and symptoms of worsening condition:  Hyperthermia, Fever, Rapid heart rate (>90), Shortness of breath/rapid respiratory rate, Altered mental status(confusion/coma)   If the patient is a current smoker, are they able to teach back resources for cessation? [id number]   Is the patient/caregiver able to teach back the hierarchy of who to call/visit for symptoms/problems? PCP, Specialist, Home health nurse, Urgent Care, ED, 911 Yes   Week 2 call completed? Yes   Call end time 1113            Hoa ROSALES - Licensed Nurse

## 2023-09-30 LAB — FUNGUS WND CULT: NORMAL

## 2023-10-06 ENCOUNTER — READMISSION MANAGEMENT (OUTPATIENT)
Dept: CALL CENTER | Facility: HOSPITAL | Age: 68
End: 2023-10-06
Payer: MEDICARE

## 2023-10-06 NOTE — OUTREACH NOTE
Sepsis Week 3 Survey      Flowsheet Row Responses   [facility] facility patient discharged from? [place]   Does the patient have one of the following disease processes/diagnoses(primary or secondary)? Sepsis   Week 3 attempt successful? No   Unsuccessful attempts Attempt 1            Evelia ARRINGTON - Registered Nurse

## 2023-10-07 LAB — FUNGUS WND CULT: NORMAL

## 2023-10-11 ENCOUNTER — READMISSION MANAGEMENT (OUTPATIENT)
Dept: CALL CENTER | Facility: HOSPITAL | Age: 68
End: 2023-10-11
Payer: MEDICARE

## 2023-10-11 NOTE — OUTREACH NOTE
Sepsis Week 3 Survey      Flowsheet Row Responses   [facility] facility patient discharged from? [place]   Does the patient have one of the following disease processes/diagnoses(primary or secondary)? Sepsis   Week 3 attempt successful? No   Unsuccessful attempts Attempt 2            ANGEL MCGARTH - Registered Nurse

## (undated) DEVICE — SUT PROLENE CARDIO C1D 6/0 24IN 8726H

## (undated) DEVICE — SYR LUERLOK 20CC

## (undated) DEVICE — GLV SURG SENSICARE GREEN W/ALOE PF LF 7 STRL

## (undated) DEVICE — Device

## (undated) DEVICE — GLV SURG SENSICARE GREEN W/ALOE PF LF 6.5 STRL

## (undated) DEVICE — DRSNG TELFA PAD NONADH STR 1S 3X8IN

## (undated) DEVICE — PK VASC LF 60

## (undated) DEVICE — TBG HI PRESSURE 500PSI 20IN

## (undated) DEVICE — SUT VICRYL 4/0 SUTUPAK 18 J109T

## (undated) DEVICE — STERILE POLYISOPRENE POWDER-FREE SURGICAL GLOVES WITH EMOLLIENT COATING: Brand: PROTEXIS

## (undated) DEVICE — SUNDT™ EXTERNAL CAROTID ENDARTERECTOMY SHUNT: Brand: SUNDT™

## (undated) DEVICE — GOWN,AURORA,NOREINF,RAGLAN,XL,STERILE: Brand: MEDLINE

## (undated) DEVICE — SUNDT™ INTERNAL CAROTID ENDARTERECTOMY SHUNT: Brand: SUNDT™

## (undated) DEVICE — Device: Brand: JELCO

## (undated) DEVICE — SPNG DRN AMD EXCILON 6PLY 4X4IN PK/2

## (undated) DEVICE — GOWN,NON-REINFORCED,4XL: Brand: MEDLINE

## (undated) DEVICE — STERILE POLYISOPRENE POWDER-FREE SURGICAL GLOVES: Brand: PROTEXIS

## (undated) DEVICE — SHEET,DRAPE,53X77,STERILE: Brand: MEDLINE

## (undated) DEVICE — RESERVOIR,SUCTION,100CC,SILICONE: Brand: MEDLINE

## (undated) DEVICE — TOWEL,OR,DSP,ST,BLUE,DLX,4/PK,20PK/CS: Brand: MEDLINE

## (undated) DEVICE — SUT SILK 2/0 FS BLK 18IN 685G

## (undated) DEVICE — GLV SURG SENSICARE GREEN W/ALOE PF LF 6 STRL

## (undated) DEVICE — SUT VICRYL 3-0 SH-1 PO 18IN J772D

## (undated) DEVICE — NDL HYPO PRECISIONGLIDE/REG 30G 1/2 BRN

## (undated) DEVICE — TOTAL TRAY, 16FR 10ML SIL FOLEY, URN: Brand: MEDLINE

## (undated) DEVICE — SUT PROLN CARDIO BV1 6/0 24IN 8805H

## (undated) DEVICE — SUT SILK 0 TIES 18IN A186H BX36

## (undated) DEVICE — SYS SKIN CLS DERMABOND PRINEO W/22CM MESH TP

## (undated) DEVICE — DRSNG TELFA PAD NONADH STR 1S 3X4IN

## (undated) DEVICE — STPLR SKIN VISISTAT WD 35CT

## (undated) DEVICE — SUT PROLENE 7-0 BV175-7 24IN DB ETH8766H

## (undated) DEVICE — ELECTRD BLD EZ CLN MOD 2.5IN

## (undated) DEVICE — SOL IRR NACL 0.9PCT BT 1000ML